# Patient Record
Sex: MALE | Race: WHITE | NOT HISPANIC OR LATINO
[De-identification: names, ages, dates, MRNs, and addresses within clinical notes are randomized per-mention and may not be internally consistent; named-entity substitution may affect disease eponyms.]

---

## 2018-04-16 ENCOUNTER — APPOINTMENT (OUTPATIENT)
Dept: SPEECH THERAPY | Facility: HOSPITAL | Age: 78
End: 2018-04-16
Payer: COMMERCIAL

## 2018-04-16 ENCOUNTER — APPOINTMENT (OUTPATIENT)
Dept: RADIOLOGY | Facility: HOSPITAL | Age: 78
End: 2018-04-16
Payer: COMMERCIAL

## 2018-04-16 ENCOUNTER — OUTPATIENT (OUTPATIENT)
Dept: OUTPATIENT SERVICES | Facility: HOSPITAL | Age: 78
LOS: 1 days | End: 2018-04-16

## 2018-04-16 ENCOUNTER — OUTPATIENT (OUTPATIENT)
Dept: OUTPATIENT SERVICES | Facility: HOSPITAL | Age: 78
LOS: 1 days | Discharge: HOME | End: 2018-04-16

## 2018-04-16 DIAGNOSIS — T17.890A OTHER FOREIGN OBJECT IN OTHER PARTS OF RESPIRATORY TRACT CAUSING ASPHYXIATION, INITIAL ENCOUNTER: ICD-10-CM

## 2018-04-16 PROCEDURE — 74230 X-RAY XM SWLNG FUNCJ C+: CPT | Mod: 26

## 2018-05-02 DIAGNOSIS — R13.13 DYSPHAGIA, PHARYNGEAL PHASE: ICD-10-CM

## 2018-07-04 ENCOUNTER — EMERGENCY (EMERGENCY)
Facility: HOSPITAL | Age: 78
LOS: 1 days | End: 2018-07-04
Payer: COMMERCIAL

## 2018-07-04 PROCEDURE — 73700 CT LOWER EXTREMITY W/O DYE: CPT | Mod: 26,RT

## 2018-07-04 PROCEDURE — 99284 EMERGENCY DEPT VISIT MOD MDM: CPT

## 2018-07-04 PROCEDURE — 72131 CT LUMBAR SPINE W/O DYE: CPT | Mod: 26

## 2018-07-04 PROCEDURE — 71046 X-RAY EXAM CHEST 2 VIEWS: CPT | Mod: 26

## 2018-07-23 ENCOUNTER — RX RENEWAL (OUTPATIENT)
Age: 78
End: 2018-07-23

## 2018-09-21 ENCOUNTER — RECORD ABSTRACTING (OUTPATIENT)
Age: 78
End: 2018-09-21

## 2018-09-21 DIAGNOSIS — Z87.891 PERSONAL HISTORY OF NICOTINE DEPENDENCE: ICD-10-CM

## 2018-09-21 DIAGNOSIS — J45.30 MILD PERSISTENT ASTHMA, UNCOMPLICATED: ICD-10-CM

## 2018-09-24 ENCOUNTER — APPOINTMENT (OUTPATIENT)
Dept: PULMONOLOGY | Facility: CLINIC | Age: 78
End: 2018-09-24
Payer: COMMERCIAL

## 2018-09-24 VITALS
WEIGHT: 118 LBS | SYSTOLIC BLOOD PRESSURE: 150 MMHG | HEIGHT: 67.5 IN | BODY MASS INDEX: 18.3 KG/M2 | RESPIRATION RATE: 12 BRPM | DIASTOLIC BLOOD PRESSURE: 76 MMHG | HEART RATE: 59 BPM | OXYGEN SATURATION: 92 % | TEMPERATURE: 97.8 F

## 2018-09-24 PROCEDURE — 99213 OFFICE O/P EST LOW 20 MIN: CPT | Mod: 25

## 2018-09-24 PROCEDURE — 71046 X-RAY EXAM CHEST 2 VIEWS: CPT

## 2018-10-03 ENCOUNTER — OTHER (OUTPATIENT)
Age: 78
End: 2018-10-03

## 2019-01-21 ENCOUNTER — RX RENEWAL (OUTPATIENT)
Age: 79
End: 2019-01-21

## 2019-03-01 ENCOUNTER — APPOINTMENT (OUTPATIENT)
Dept: PULMONOLOGY | Facility: CLINIC | Age: 79
End: 2019-03-01
Payer: COMMERCIAL

## 2019-03-01 VITALS
BODY MASS INDEX: 18.83 KG/M2 | HEIGHT: 67 IN | SYSTOLIC BLOOD PRESSURE: 169 MMHG | WEIGHT: 120 LBS | RESPIRATION RATE: 16 BRPM | HEART RATE: 59 BPM | TEMPERATURE: 97.8 F | DIASTOLIC BLOOD PRESSURE: 77 MMHG | OXYGEN SATURATION: 98 %

## 2019-03-01 PROCEDURE — 94727 GAS DIL/WSHOT DETER LNG VOL: CPT

## 2019-03-01 PROCEDURE — 94729 DIFFUSING CAPACITY: CPT

## 2019-03-01 PROCEDURE — 71046 X-RAY EXAM CHEST 2 VIEWS: CPT

## 2019-03-01 PROCEDURE — 94060 EVALUATION OF WHEEZING: CPT

## 2019-03-01 PROCEDURE — 99214 OFFICE O/P EST MOD 30 MIN: CPT | Mod: 25

## 2019-03-01 NOTE — HISTORY OF PRESENT ILLNESS
[FreeTextEntry1] : Some difficulty with vertebral fracture and osteoporosis.\par Seeing endo.\par \par No significant cough, wheezing, chest pain or SOB.\par

## 2019-03-01 NOTE — REASON FOR VISIT
[Follow-Up] : a follow-up visit [Abnormal Chest X-Ray] : abnormal Chest X-Ray [Bronchiectasis] : bronchiectasis [COPD] : COPD

## 2019-03-01 NOTE — PROCEDURE
[FreeTextEntry1] : CXR PA and lateral reveals a tortuous aorta. Bony structures are intact. There is no significant pleural disease. There is an area of linear atelectasis versus scarring in the upper lobe most likely right. There is no significant change compared to prior radiograph of 9/24/18.\par \par Pulmonary function testing.\par These data demonstrate a mild obstructive ventilatory deficit. There is no significant bronchodilator response. Normal Lung Volumes. There is a moderate diffusion impairment. \par Fx decreased compared to 3/18 but similar to 9/17.

## 2019-03-01 NOTE — PHYSICAL EXAM
[General Appearance - Well Developed] : well developed [General Appearance - Well Nourished] : well nourished [Normal Oropharynx] : normal oropharynx [Jugular Venous Distention Increased] : there was no jugular-venous distention [Heart Sounds] : normal S1 and S2 [Murmurs] : no murmurs present [Auscultation Breath Sounds / Voice Sounds] : lungs were clear to auscultation bilaterally [Lungs Percussion] : the lungs were normal to percussion [Abdomen Soft] : soft [Abdomen Tenderness] : non-tender [] : no hepato-splenomegaly [Nail Clubbing] : no clubbing of the fingernails [Cyanosis, Localized] : no localized cyanosis

## 2019-03-01 NOTE — CONSULT LETTER
[Dear  ___] : Dear ~WENDY, [Consult Letter:] : I had the pleasure of evaluating your patient, [unfilled]. [Please see my note below.] : Please see my note below. [Consult Closing:] : Thank you very much for allowing me to participate in the care of this patient.  If you have any questions, please do not hesitate to contact me. [Sincerely,] : Sincerely, [FreeTextEntry2] : Cresencio Luna MD [FreeTextEntry3] : Navdeep Solorio MD FCCP\par

## 2019-07-30 ENCOUNTER — RX RENEWAL (OUTPATIENT)
Age: 79
End: 2019-07-30

## 2019-08-02 ENCOUNTER — INPATIENT (INPATIENT)
Facility: HOSPITAL | Age: 79
LOS: 1 days | Discharge: HOME | End: 2019-08-04
Attending: INTERNAL MEDICINE | Admitting: INTERNAL MEDICINE
Payer: COMMERCIAL

## 2019-08-02 PROCEDURE — 71045 X-RAY EXAM CHEST 1 VIEW: CPT | Mod: 26

## 2019-08-02 PROCEDURE — 99285 EMERGENCY DEPT VISIT HI MDM: CPT

## 2019-08-02 PROCEDURE — 93458 L HRT ARTERY/VENTRICLE ANGIO: CPT | Mod: 26

## 2019-08-02 PROCEDURE — 99255 IP/OBS CONSLTJ NEW/EST HI 80: CPT | Mod: 25

## 2019-08-03 PROCEDURE — 99233 SBSQ HOSP IP/OBS HIGH 50: CPT

## 2019-08-03 PROCEDURE — 93010 ELECTROCARDIOGRAM REPORT: CPT

## 2019-08-03 PROCEDURE — 71250 CT THORAX DX C-: CPT | Mod: 26

## 2019-08-04 PROCEDURE — 93306 TTE W/DOPPLER COMPLETE: CPT | Mod: 26

## 2019-08-04 PROCEDURE — 99233 SBSQ HOSP IP/OBS HIGH 50: CPT

## 2019-08-05 ENCOUNTER — TRANSCRIPTION ENCOUNTER (OUTPATIENT)
Age: 79
End: 2019-08-05

## 2019-08-08 ENCOUNTER — APPOINTMENT (OUTPATIENT)
Dept: CARDIOLOGY | Facility: CLINIC | Age: 79
End: 2019-08-08
Payer: COMMERCIAL

## 2019-08-08 VITALS
DIASTOLIC BLOOD PRESSURE: 68 MMHG | HEIGHT: 67 IN | WEIGHT: 120 LBS | HEART RATE: 71 BPM | SYSTOLIC BLOOD PRESSURE: 152 MMHG | BODY MASS INDEX: 18.83 KG/M2

## 2019-08-08 DIAGNOSIS — Z98.890 OTHER SPECIFIED POSTPROCEDURAL STATES: ICD-10-CM

## 2019-08-08 DIAGNOSIS — Z82.49 FAMILY HISTORY OF ISCHEMIC HEART DISEASE AND OTHER DISEASES OF THE CIRCULATORY SYSTEM: ICD-10-CM

## 2019-08-08 PROCEDURE — 99215 OFFICE O/P EST HI 40 MIN: CPT

## 2019-08-08 NOTE — REASON FOR VISIT
[Coronary Artery Disease] : coronary artery disease [Follow-Up - From Hospitalization] : follow-up of a recent hospitalization for [Hypertension] : hypertension [Hyperlipidemia] : hyperlipidemia [FreeTextEntry1] : I saw this 79-year-old man in followup consultation on  08/08/19\par He presented to the hospital one week ago with chest pain and ST elevation and underwent emergent angiography, which showed moderate triple-vessel disease and a normal left ventricle.\par He was diagnosed as having pericarditis, with a pericardial rub auscultated and a CT scan showing a small pericardial effusion. He also had patchy bilateral pneumonia with a high white count and was treated with antibiotics, and colchicine for the pericarditis.\par Within 48 hours his pain was completely resolved, and his white count came down, and he was discharged on medication.

## 2019-08-08 NOTE — DISCUSSION/SUMMARY
[FreeTextEntry1] : 1) Stop antibiotics tomorrow.\par 2) Colchicine for another week and then wean.\par 3) F/U visit

## 2019-08-08 NOTE — PHYSICAL EXAM
[General Appearance - Well Developed] : well developed [Normal Appearance] : normal appearance [Well Groomed] : well groomed [General Appearance - Well Nourished] : well nourished [Normal Conjunctiva] : the conjunctiva exhibited no abnormalities [No Deformities] : no deformities [General Appearance - In No Acute Distress] : no acute distress [Eyelids - No Xanthelasma] : the eyelids demonstrated no xanthelasmas [Normal Oral Mucosa] : normal oral mucosa [No Oral Pallor] : no oral pallor [No Oral Cyanosis] : no oral cyanosis [Normal Jugular Venous V Waves Present] : normal jugular venous V waves present [Normal Jugular Venous A Waves Present] : normal jugular venous A waves present [No Jugular Venous Viera A Waves] : no jugular venous viera A waves [Respiration, Rhythm And Depth] : normal respiratory rhythm and effort [Auscultation Breath Sounds / Voice Sounds] : lungs were clear to auscultation bilaterally [Exaggerated Use Of Accessory Muscles For Inspiration] : no accessory muscle use [Heart Rate And Rhythm] : heart rate and rhythm were normal [Heart Sounds] : normal S1 and S2 [Abdomen Soft] : soft [Murmurs] : no murmurs present [Abdomen Tenderness] : non-tender [Abdomen Mass (___ Cm)] : no abdominal mass palpated [Abnormal Walk] : normal gait [Gait - Sufficient For Exercise Testing] : the gait was sufficient for exercise testing [Cyanosis, Localized] : no localized cyanosis [Nail Clubbing] : no clubbing of the fingernails [Petechial Hemorrhages (___cm)] : no petechial hemorrhages [] : no rash [Skin Color & Pigmentation] : normal skin color and pigmentation [Skin Lesions] : no skin lesions [No Venous Stasis] : no venous stasis [No Skin Ulcers] : no skin ulcer [No Xanthoma] : no  xanthoma was observed [Oriented To Time, Place, And Person] : oriented to person, place, and time [Affect] : the affect was normal [No Anxiety] : not feeling anxious [Mood] : the mood was normal

## 2019-09-04 ENCOUNTER — APPOINTMENT (OUTPATIENT)
Dept: CARDIOLOGY | Facility: CLINIC | Age: 79
End: 2019-09-04
Payer: COMMERCIAL

## 2019-09-04 ENCOUNTER — APPOINTMENT (OUTPATIENT)
Dept: PULMONOLOGY | Facility: CLINIC | Age: 79
End: 2019-09-04
Payer: COMMERCIAL

## 2019-09-04 VITALS
OXYGEN SATURATION: 98 % | BODY MASS INDEX: 18.83 KG/M2 | RESPIRATION RATE: 15 BRPM | HEIGHT: 67 IN | DIASTOLIC BLOOD PRESSURE: 73 MMHG | HEART RATE: 78 BPM | WEIGHT: 120 LBS | SYSTOLIC BLOOD PRESSURE: 131 MMHG

## 2019-09-04 VITALS — TEMPERATURE: 98.6 F

## 2019-09-04 PROCEDURE — 36415 COLL VENOUS BLD VENIPUNCTURE: CPT

## 2019-09-04 PROCEDURE — 71046 X-RAY EXAM CHEST 2 VIEWS: CPT

## 2019-09-04 PROCEDURE — 99214 OFFICE O/P EST MOD 30 MIN: CPT | Mod: 25

## 2019-09-04 PROCEDURE — 93306 TTE W/DOPPLER COMPLETE: CPT

## 2019-09-04 PROCEDURE — 94060 EVALUATION OF WHEEZING: CPT

## 2019-09-04 RX ORDER — AZITHROMYCIN 250 MG/1
250 TABLET, FILM COATED ORAL
Qty: 6 | Refills: 0 | Status: DISCONTINUED | COMMUNITY
Start: 2019-07-25

## 2019-09-04 RX ORDER — DOXYCYCLINE HYCLATE 100 MG/1
100 TABLET ORAL
Qty: 10 | Refills: 0 | Status: DISCONTINUED | COMMUNITY
Start: 2019-08-04 | End: 2019-09-04

## 2019-09-04 RX ORDER — FINASTERIDE 5 MG/1
5 TABLET, FILM COATED ORAL
Qty: 30 | Refills: 0 | Status: ACTIVE | COMMUNITY
Start: 2019-03-19

## 2019-09-04 RX ORDER — ATORVASTATIN CALCIUM 10 MG/1
10 TABLET, FILM COATED ORAL
Qty: 30 | Refills: 0 | Status: DISCONTINUED | COMMUNITY
Start: 2019-08-04 | End: 2019-09-04

## 2019-09-04 NOTE — ASSESSMENT
[FreeTextEntry1] : Discuss with cardio\par Course of Zithro and Medrol\par Labs drawn in office today\par Close observation

## 2019-09-04 NOTE — HISTORY OF PRESENT ILLNESS
[FreeTextEntry1] : Patient was hospitalized in august for chest pain and cough and had angiogram and had acute pericarditis and pneumonia. placed on doxy on discharge.Hospitalized in ICU [facility]. Told secondary to pneumonia. \par \par Now c/o cough for past 10 days with some clear mucus. Feels discomfort with deep breaths, difficult to sleep. Every afternoon had body aches, recent d/c Lipitor and took Motrin with help. Just discontinued.\par ?? Fever. \par \par Was on colchicine for pericarditis off 1 week. \par \par CC is cough

## 2019-09-04 NOTE — DISCUSSION/SUMMARY
[FreeTextEntry1] : Cough likely exacerbation of bronchiectasis, airways disease\par Pulmonary nodule likely old will compare.\par Chest pain musculoskeletal related to cough vs. recc. of pericarditis. Patient feels cough related.

## 2019-09-04 NOTE — PROCEDURE
[FreeTextEntry1] : CXR PA and lateral reveals a tortuous aorta. Bony structures are intact. There is no significant pleural disease. There is an area of linear atelectasis versus scarring in the upper lobe most likely right and in left paracardiac region.\par  The only change compared to prior radiograph of 4/1/19 is slight increase in size of cardiac silhouette.\par \par Pulmonary function testing.\par These data demonstrate a mild-moderate obstructive ventilatory deficit. There is no significant bronchodilator response. \par PFT attached relatively stable function.\par \par CT noted.\par \par Stat echo small effusion no restriction or tamponade. \par \par

## 2019-09-06 ENCOUNTER — APPOINTMENT (OUTPATIENT)
Dept: PULMONOLOGY | Facility: CLINIC | Age: 79
End: 2019-09-06

## 2019-09-06 LAB
ALBUMIN SERPL ELPH-MCNC: 4.1 G/DL
ALP BLD-CCNC: 107 U/L
ALT SERPL-CCNC: 23 U/L
ANION GAP SERPL CALC-SCNC: 12 MMOL/L
AST SERPL-CCNC: 28 U/L
BASOPHILS # BLD AUTO: 0.1 K/UL
BASOPHILS NFR BLD AUTO: 0.8 %
BILIRUB SERPL-MCNC: 0.7 MG/DL
BUN SERPL-MCNC: 26 MG/DL
CALCIUM SERPL-MCNC: 9.5 MG/DL
CHLORIDE SERPL-SCNC: 104 MMOL/L
CK SERPL-CCNC: 59 U/L
CO2 SERPL-SCNC: 23 MMOL/L
CREAT SERPL-MCNC: 1.42 MG/DL
CRP SERPL-MCNC: 4.74 MG/DL
EOSINOPHIL # BLD AUTO: 0.24 K/UL
EOSINOPHIL NFR BLD AUTO: 2 %
ERYTHROCYTE [SEDIMENTATION RATE] IN BLOOD BY WESTERGREN METHOD: 42 MM/HR
GLUCOSE SERPL-MCNC: 90 MG/DL
HCT VFR BLD CALC: 41.6 %
HGB BLD-MCNC: 13.2 G/DL
IMM GRANULOCYTES NFR BLD AUTO: 0.7 %
LYMPHOCYTES # BLD AUTO: 1.47 K/UL
LYMPHOCYTES NFR BLD AUTO: 12.1 %
MAN DIFF?: NORMAL
MCHC RBC-ENTMCNC: 31.7 GM/DL
MCHC RBC-ENTMCNC: 32.4 PG
MCV RBC AUTO: 102.2 FL
MONOCYTES # BLD AUTO: 1.5 K/UL
MONOCYTES NFR BLD AUTO: 12.4 %
NEUTROPHILS # BLD AUTO: 8.72 K/UL
NEUTROPHILS NFR BLD AUTO: 72 %
PLATELET # BLD AUTO: 400 K/UL
POTASSIUM SERPL-SCNC: 5 MMOL/L
PROT SERPL-MCNC: 6.9 G/DL
RBC # BLD: 4.07 M/UL
RBC # FLD: 13.1 %
SODIUM SERPL-SCNC: 139 MMOL/L
WBC # FLD AUTO: 12.12 K/UL

## 2019-09-11 ENCOUNTER — APPOINTMENT (OUTPATIENT)
Dept: PULMONOLOGY | Facility: CLINIC | Age: 79
End: 2019-09-11
Payer: COMMERCIAL

## 2019-09-11 VITALS
RESPIRATION RATE: 16 BRPM | SYSTOLIC BLOOD PRESSURE: 120 MMHG | HEART RATE: 68 BPM | OXYGEN SATURATION: 98 % | DIASTOLIC BLOOD PRESSURE: 70 MMHG

## 2019-09-11 DIAGNOSIS — R05 COUGH: ICD-10-CM

## 2019-09-11 PROCEDURE — 36415 COLL VENOUS BLD VENIPUNCTURE: CPT

## 2019-09-11 PROCEDURE — 99213 OFFICE O/P EST LOW 20 MIN: CPT | Mod: 25

## 2019-09-11 PROCEDURE — 95012 NITRIC OXIDE EXP GAS DETER: CPT

## 2019-09-11 RX ORDER — AZITHROMYCIN 250 MG/1
250 TABLET, FILM COATED ORAL
Qty: 1 | Refills: 1 | Status: DISCONTINUED | COMMUNITY
Start: 2019-09-04 | End: 2019-09-11

## 2019-09-11 RX ORDER — METHYLPREDNISOLONE 4 MG/1
4 TABLET ORAL
Qty: 1 | Refills: 0 | Status: DISCONTINUED | COMMUNITY
Start: 2019-09-04 | End: 2019-09-11

## 2019-09-11 NOTE — PHYSICAL EXAM
[General Appearance - Well Developed] : well developed [General Appearance - Well Nourished] : well nourished [Normal Oropharynx] : normal oropharynx [Jugular Venous Distention Increased] : there was no jugular-venous distention [Heart Sounds] : normal S1 and S2 [Murmurs] : no murmurs present [Auscultation Breath Sounds / Voice Sounds] : lungs were clear to auscultation bilaterally [Lungs Percussion] : the lungs were normal to percussion [Abdomen Tenderness] : non-tender [Abdomen Soft] : soft [] : no hepato-splenomegaly [Nail Clubbing] : no clubbing of the fingernails [Cyanosis, Localized] : no localized cyanosis

## 2019-09-16 LAB
BASOPHILS # BLD AUTO: 0.12 K/UL
BASOPHILS NFR BLD AUTO: 1.2 %
CRP SERPL-MCNC: 1.14 MG/DL
EOSINOPHIL # BLD AUTO: 0.84 K/UL
EOSINOPHIL NFR BLD AUTO: 8.2 %
ERYTHROCYTE [SEDIMENTATION RATE] IN BLOOD BY WESTERGREN METHOD: 20 MM/HR
HCT VFR BLD CALC: 43.8 %
HGB BLD-MCNC: 13.5 G/DL
IMM GRANULOCYTES NFR BLD AUTO: 0.7 %
LYMPHOCYTES # BLD AUTO: 1.41 K/UL
LYMPHOCYTES NFR BLD AUTO: 13.8 %
MAN DIFF?: NORMAL
MCHC RBC-ENTMCNC: 30.8 GM/DL
MCHC RBC-ENTMCNC: 32.1 PG
MCV RBC AUTO: 104.3 FL
MONOCYTES # BLD AUTO: 0.99 K/UL
MONOCYTES NFR BLD AUTO: 9.7 %
NEUTROPHILS # BLD AUTO: 6.81 K/UL
NEUTROPHILS NFR BLD AUTO: 66.4 %
PLATELET # BLD AUTO: 504 K/UL
RBC # BLD: 4.2 M/UL
RBC # FLD: 13.3 %
WBC # FLD AUTO: 10.24 K/UL

## 2019-09-16 NOTE — ASSESSMENT
[FreeTextEntry1] : Continue Colchicine\par Repeat CRP, CBC and ESR\par Repeat Medrol\par \par \par ADD: Discuss obtaining cd of old and new ct for comparison\par Prior NRAD\par

## 2019-09-16 NOTE — PROCEDURE
[FreeTextEntry1] :  Sept 6th\par  CXR PA and lateral reveals a tortuous aorta. Bony structures are intact. There is no significant pleural disease. There is an area of linear atelectasis versus scarring in the upper lobe most likely right and in left paracardiac region. The only change compared to prior radiograph of 4/1/19 is slight increase in size of cardiac silhouette.\par \par \par \par \par \par \par

## 2019-09-16 NOTE — DISCUSSION/SUMMARY
[FreeTextEntry1] : Cough likely exacerbation of bronchiectasis, airways disease improved. Still present\par Pulmonary nodule likely old will compare.\par Chest pain musculoskeletal related to cough vs. recc. of pericarditis. improved.

## 2019-09-16 NOTE — REASON FOR VISIT
[Follow-Up] : a follow-up visit [Bronchiectasis] : bronchiectasis [COPD] : COPD [FreeTextEntry2] : better after Z-Yefri and Medrol still with some chest discomfort left side but better

## 2019-09-16 NOTE — HISTORY OF PRESENT ILLNESS
[FreeTextEntry1] : On breo daily\par \par Feeling better still some dry cough.\par On colchicine\par Some pain but much better.

## 2019-10-11 ENCOUNTER — APPOINTMENT (OUTPATIENT)
Dept: PULMONOLOGY | Facility: CLINIC | Age: 79
End: 2019-10-11
Payer: COMMERCIAL

## 2019-10-11 VITALS
HEART RATE: 67 BPM | DIASTOLIC BLOOD PRESSURE: 78 MMHG | OXYGEN SATURATION: 97 % | SYSTOLIC BLOOD PRESSURE: 135 MMHG | TEMPERATURE: 98 F

## 2019-10-11 PROCEDURE — 36415 COLL VENOUS BLD VENIPUNCTURE: CPT

## 2019-10-11 PROCEDURE — 99213 OFFICE O/P EST LOW 20 MIN: CPT | Mod: 25

## 2019-10-11 RX ORDER — METHYLPREDNISOLONE 4 MG/1
4 TABLET ORAL
Qty: 1 | Refills: 0 | Status: DISCONTINUED | COMMUNITY
Start: 2019-09-11 | End: 2019-10-11

## 2019-10-12 NOTE — DISCUSSION/SUMMARY
[FreeTextEntry1] : Chest pain essentially resolved. ? Pleuritis or pericarditis\par Abnormal CT\par Cough improved on bronchodilator therapy\par COPD\par Bronchiectasis.

## 2019-10-12 NOTE — ASSESSMENT
[FreeTextEntry1] : Wean off colchicine\par Continue present bronchodilator therapy\par Have last 2 CT scans compared\par Gave pt. cd of CT of 2018

## 2019-10-12 NOTE — HISTORY OF PRESENT ILLNESS
[FreeTextEntry1] : Overall feeling better. Occasional cough, denies wheeze. No SOB. Using Breo daily. \par asking for a proair inhaler. No more chest discomfort. Only pain now feels MS in origin.\par \par \par will get flu and pneumo shot at [facility]

## 2019-10-15 LAB
BASOPHILS # BLD AUTO: 0.09 K/UL
BASOPHILS NFR BLD AUTO: 1.1 %
CRP SERPL-MCNC: 0.1 MG/DL
EOSINOPHIL # BLD AUTO: 0.37 K/UL
EOSINOPHIL NFR BLD AUTO: 4.5 %
HCT VFR BLD CALC: 45 %
HGB BLD-MCNC: 13.7 G/DL
IMM GRANULOCYTES NFR BLD AUTO: 0.8 %
LYMPHOCYTES # BLD AUTO: 1.67 K/UL
LYMPHOCYTES NFR BLD AUTO: 20.2 %
MAN DIFF?: NORMAL
MCHC RBC-ENTMCNC: 30.4 GM/DL
MCHC RBC-ENTMCNC: 31.1 PG
MCV RBC AUTO: 102.3 FL
MONOCYTES # BLD AUTO: 0.74 K/UL
MONOCYTES NFR BLD AUTO: 8.9 %
NEUTROPHILS # BLD AUTO: 5.34 K/UL
NEUTROPHILS NFR BLD AUTO: 64.5 %
PLATELET # BLD AUTO: 446 K/UL
RBC # BLD: 4.4 M/UL
RBC # FLD: 13.9 %
WBC # FLD AUTO: 8.28 K/UL

## 2019-12-03 ENCOUNTER — MEDICATION RENEWAL (OUTPATIENT)
Age: 79
End: 2019-12-03

## 2020-01-06 ENCOUNTER — APPOINTMENT (OUTPATIENT)
Dept: PULMONOLOGY | Facility: CLINIC | Age: 80
End: 2020-01-06
Payer: MEDICARE

## 2020-01-06 ENCOUNTER — RX RENEWAL (OUTPATIENT)
Age: 80
End: 2020-01-06

## 2020-01-06 VITALS
HEART RATE: 62 BPM | DIASTOLIC BLOOD PRESSURE: 76 MMHG | TEMPERATURE: 97.8 F | SYSTOLIC BLOOD PRESSURE: 136 MMHG | RESPIRATION RATE: 16 BRPM | OXYGEN SATURATION: 98 %

## 2020-01-06 PROCEDURE — 94727 GAS DIL/WSHOT DETER LNG VOL: CPT

## 2020-01-06 PROCEDURE — 94729 DIFFUSING CAPACITY: CPT

## 2020-01-06 PROCEDURE — 99213 OFFICE O/P EST LOW 20 MIN: CPT | Mod: 25

## 2020-01-06 PROCEDURE — 94060 EVALUATION OF WHEEZING: CPT

## 2020-01-06 RX ORDER — ALBUTEROL SULFATE 90 UG/1
108 (90 BASE) AEROSOL, METERED RESPIRATORY (INHALATION)
Qty: 1 | Refills: 5 | Status: DISCONTINUED | COMMUNITY
Start: 2019-10-11 | End: 2020-01-06

## 2020-01-06 RX ORDER — COLCHICINE 0.6 MG/1
0.6 CAPSULE ORAL
Qty: 30 | Refills: 3 | Status: DISCONTINUED | COMMUNITY
End: 2020-01-06

## 2020-01-06 RX ORDER — COLCHICINE 0.6 MG/1
0.6 TABLET ORAL
Qty: 7 | Refills: 0 | Status: DISCONTINUED | COMMUNITY
Start: 2019-08-04 | End: 2020-01-06

## 2020-01-06 RX ORDER — ALBUTEROL SULFATE 90 UG/1
108 (90 BASE) AEROSOL, METERED RESPIRATORY (INHALATION)
Qty: 1 | Refills: 3 | Status: DISCONTINUED | COMMUNITY
Start: 2019-03-01 | End: 2020-01-06

## 2020-01-06 NOTE — PHYSICAL EXAM
[General Appearance - Well Developed] : well developed [Normal Oropharynx] : normal oropharynx [General Appearance - Well Nourished] : well nourished [Jugular Venous Distention Increased] : there was no jugular-venous distention [Heart Sounds] : normal S1 and S2 [Auscultation Breath Sounds / Voice Sounds] : lungs were clear to auscultation bilaterally [Murmurs] : no murmurs present [Lungs Percussion] : the lungs were normal to percussion [Abdomen Tenderness] : non-tender [Abdomen Soft] : soft [] : no hepato-splenomegaly [Nail Clubbing] : no clubbing of the fingernails [Cyanosis, Localized] : no localized cyanosis

## 2020-01-06 NOTE — DISCUSSION/SUMMARY
[FreeTextEntry1] : \par Abnormal CT\par Cough improved on bronchodilator therapy\par COPD\par Bronchiectasis.

## 2020-01-06 NOTE — HISTORY OF PRESENT ILLNESS
[FreeTextEntry1] : Overall feeling better. Occasional cough, denies wheeze. No SOB. Using Breo daily. \par using albuterol rare. No more chest discomfort..\par \par got flu shot\par \par On prolia for osteoporosis. \par

## 2020-01-06 NOTE — ASSESSMENT
[FreeTextEntry1] : \par Continue present bronchodilator therapy\par Have last 2 CT scans compared. Gave pt another disc from 2018 to bring to [facility]\par Medications reviewed and renewed.\par

## 2020-01-06 NOTE — PROCEDURE
[FreeTextEntry1] : 01/06/2020\par Pulmonary function testing\par These data demonstrate a mild obstructive ventilatory deficit. There is no significant bronchodilator response. Normal Lung Volumes. There is a moderate-severe diffusion impairment. \par Relatively stable function\par

## 2020-01-23 ENCOUNTER — TRANSCRIPTION ENCOUNTER (OUTPATIENT)
Age: 80
End: 2020-01-23

## 2020-02-11 ENCOUNTER — APPOINTMENT (OUTPATIENT)
Dept: CARDIOLOGY | Facility: CLINIC | Age: 80
End: 2020-02-11
Payer: MEDICARE

## 2020-02-11 VITALS
HEART RATE: 69 BPM | OXYGEN SATURATION: 97 % | BODY MASS INDEX: 18.83 KG/M2 | SYSTOLIC BLOOD PRESSURE: 142 MMHG | HEIGHT: 67 IN | DIASTOLIC BLOOD PRESSURE: 70 MMHG | WEIGHT: 120 LBS

## 2020-02-11 DIAGNOSIS — I31.9 DISEASE OF PERICARDIUM, UNSPECIFIED: ICD-10-CM

## 2020-02-11 PROCEDURE — 99215 OFFICE O/P EST HI 40 MIN: CPT

## 2020-02-11 RX ORDER — HYDROCORTISONE 25 MG/ML
2.5 LOTION TOPICAL
Qty: 59 | Refills: 0 | Status: DISCONTINUED | COMMUNITY
Start: 2019-03-11 | End: 2020-02-11

## 2020-02-11 RX ORDER — DENOSUMAB 60 MG/ML
60 INJECTION SUBCUTANEOUS
Refills: 0 | Status: ACTIVE | COMMUNITY

## 2020-02-11 RX ORDER — ASPIRIN 81 MG/1
81 TABLET, CHEWABLE ORAL
Qty: 90 | Refills: 2 | Status: DISCONTINUED | COMMUNITY
End: 2020-02-11

## 2020-02-11 NOTE — PHYSICAL EXAM
[General Appearance - Well Developed] : well developed [Normal Appearance] : normal appearance [Well Groomed] : well groomed [General Appearance - Well Nourished] : well nourished [No Deformities] : no deformities [Eyelids - No Xanthelasma] : the eyelids demonstrated no xanthelasmas [General Appearance - In No Acute Distress] : no acute distress [Normal Conjunctiva] : the conjunctiva exhibited no abnormalities [Normal Oral Mucosa] : normal oral mucosa [No Oral Pallor] : no oral pallor [No Oral Cyanosis] : no oral cyanosis [Normal Jugular Venous A Waves Present] : normal jugular venous A waves present [Normal Jugular Venous V Waves Present] : normal jugular venous V waves present [No Jugular Venous Viera A Waves] : no jugular venous viera A waves [Respiration, Rhythm And Depth] : normal respiratory rhythm and effort [Exaggerated Use Of Accessory Muscles For Inspiration] : no accessory muscle use [Auscultation Breath Sounds / Voice Sounds] : lungs were clear to auscultation bilaterally [Heart Rate And Rhythm] : heart rate and rhythm were normal [Heart Sounds] : normal S1 and S2 [Murmurs] : no murmurs present [Abdomen Soft] : soft [Abdomen Tenderness] : non-tender [Abdomen Mass (___ Cm)] : no abdominal mass palpated [Abnormal Walk] : normal gait [Nail Clubbing] : no clubbing of the fingernails [Gait - Sufficient For Exercise Testing] : the gait was sufficient for exercise testing [Petechial Hemorrhages (___cm)] : no petechial hemorrhages [Cyanosis, Localized] : no localized cyanosis [] : no ischemic changes [Skin Color & Pigmentation] : normal skin color and pigmentation [Skin Lesions] : no skin lesions [No Venous Stasis] : no venous stasis [No Skin Ulcers] : no skin ulcer [No Xanthoma] : no  xanthoma was observed [Oriented To Time, Place, And Person] : oriented to person, place, and time [Affect] : the affect was normal [Mood] : the mood was normal [No Anxiety] : not feeling anxious

## 2020-02-11 NOTE — REASON FOR VISIT
[Follow-Up - Clinic] : a clinic follow-up of [Coronary Artery Disease] : coronary artery disease [Hyperlipidemia] : hyperlipidemia [Hypertension] : hypertension [FreeTextEntry1] : I saw this 80-year-old man in followup consultation on  02/11/20\par He presented to the hospital last summer with chest pain and ST elevation and underwent emergent angiography, which showed moderate triple-vessel disease and a normal left ventricle.\par He was diagnosed as having pericarditis, with a pericardial rub auscultated and a CT scan showing a small pericardial effusion. He also had patchy bilateral pneumonia with a high white count and was treated with antibiotics, and colchicine for the pericarditis.\par Within 48 hours his pain was completely resolved, and his white count came down, and he was discharged on medication.\par He has been doing well with no recurrence, functioning at a high level without symptoms

## 2020-03-02 ENCOUNTER — RX RENEWAL (OUTPATIENT)
Age: 80
End: 2020-03-02

## 2020-03-27 RX ORDER — ALBUTEROL SULFATE 90 UG/1
108 (90 BASE) INHALANT RESPIRATORY (INHALATION)
Qty: 90 | Refills: 5 | Status: ACTIVE | COMMUNITY
Start: 2020-01-06 | End: 1900-01-01

## 2020-05-04 ENCOUNTER — APPOINTMENT (OUTPATIENT)
Dept: PULMONOLOGY | Facility: CLINIC | Age: 80
End: 2020-05-04
Payer: MEDICARE

## 2020-05-04 ENCOUNTER — APPOINTMENT (OUTPATIENT)
Dept: PULMONOLOGY | Facility: CLINIC | Age: 80
End: 2020-05-04

## 2020-05-04 PROCEDURE — 99213 OFFICE O/P EST LOW 20 MIN: CPT | Mod: 95

## 2020-05-04 NOTE — ASSESSMENT
[FreeTextEntry1] : Continue present bronchodilator therapy\par FU July or August\par Possible CT august\par \par Duration discussion and decision making 15 minutes.\par

## 2020-05-04 NOTE — HISTORY OF PRESENT ILLNESS
[Home] : at home, [unfilled] , at the time of the visit. [Medical Office: ([facility])___] : at the medical office located in  [TextBox_4] : This visit was provided via telehealth using real-time 2-way audio visual technology. The patient, ART BLACK , [place] [address] [place] [address]  at the time of the visit.\par The provider, Navdeep Solorio, was located at office [address] [place] [address] at the time of the visit. \par \par  The patient, Mr. ART BLACK  and Physician Navdeep Duenas participated in the telehealth encounter.\par \par Verbal consent obtained by  from patient\par \par Doing well\par \par No significant cough, wheezing, chest pain or SOB.\par \par Compliant to bronchodilator therapy\par

## 2020-05-04 NOTE — DISCUSSION/SUMMARY
[FreeTextEntry1] : \par Abnormal CT\par Cough resolved on bronchodilator therapy\par COPD\par Bronchiectasis.

## 2020-07-23 ENCOUNTER — NON-APPOINTMENT (OUTPATIENT)
Age: 80
End: 2020-07-23

## 2020-07-23 ENCOUNTER — APPOINTMENT (OUTPATIENT)
Dept: CARDIOLOGY | Facility: CLINIC | Age: 80
End: 2020-07-23
Payer: MEDICARE

## 2020-07-23 VITALS
SYSTOLIC BLOOD PRESSURE: 138 MMHG | HEIGHT: 67 IN | DIASTOLIC BLOOD PRESSURE: 70 MMHG | BODY MASS INDEX: 18.21 KG/M2 | OXYGEN SATURATION: 98 % | WEIGHT: 116 LBS | HEART RATE: 68 BPM

## 2020-07-23 PROCEDURE — 99215 OFFICE O/P EST HI 40 MIN: CPT

## 2020-07-23 PROCEDURE — 93000 ELECTROCARDIOGRAM COMPLETE: CPT

## 2020-07-23 NOTE — REASON FOR VISIT
[Follow-Up - Clinic] : a clinic follow-up of [Coronary Artery Disease] : coronary artery disease [Hypertension] : hypertension [Hyperlipidemia] : hyperlipidemia [FreeTextEntry1] : I saw this 80-year-old man in followup consultation on  07/23/20\par He presented to the hospital last summer with chest pain and ST elevation and underwent emergent angiography, which showed moderate triple-vessel disease and a normal left ventricle.\par He was diagnosed as having pericarditis, with a pericardial rub auscultated and a CT scan showing a small pericardial effusion. He also had patchy bilateral pneumonia with a high white count and was treated with antibiotics, and colchicine for the pericarditis.\par Within 48 hours his pain was completely resolved, and his white count came down, and he was discharged on medication.\par He has been doing well with no recurrence, functioning at a high level without symptoms

## 2020-07-23 NOTE — PHYSICAL EXAM
[General Appearance - Well Developed] : well developed [Normal Appearance] : normal appearance [Well Groomed] : well groomed [No Deformities] : no deformities [General Appearance - Well Nourished] : well nourished [Normal Conjunctiva] : the conjunctiva exhibited no abnormalities [General Appearance - In No Acute Distress] : no acute distress [Eyelids - No Xanthelasma] : the eyelids demonstrated no xanthelasmas [Normal Oral Mucosa] : normal oral mucosa [No Oral Pallor] : no oral pallor [No Oral Cyanosis] : no oral cyanosis [Normal Jugular Venous A Waves Present] : normal jugular venous A waves present [Normal Jugular Venous V Waves Present] : normal jugular venous V waves present [No Jugular Venous Viera A Waves] : no jugular venous viera A waves [Exaggerated Use Of Accessory Muscles For Inspiration] : no accessory muscle use [Respiration, Rhythm And Depth] : normal respiratory rhythm and effort [Heart Sounds] : normal S1 and S2 [Auscultation Breath Sounds / Voice Sounds] : lungs were clear to auscultation bilaterally [Heart Rate And Rhythm] : heart rate and rhythm were normal [Abdomen Soft] : soft [Abdomen Tenderness] : non-tender [Murmurs] : no murmurs present [Abnormal Walk] : normal gait [Abdomen Mass (___ Cm)] : no abdominal mass palpated [Nail Clubbing] : no clubbing of the fingernails [Gait - Sufficient For Exercise Testing] : the gait was sufficient for exercise testing [Cyanosis, Localized] : no localized cyanosis [Petechial Hemorrhages (___cm)] : no petechial hemorrhages [Skin Color & Pigmentation] : normal skin color and pigmentation [] : no ischemic changes [No Venous Stasis] : no venous stasis [No Skin Ulcers] : no skin ulcer [Skin Lesions] : no skin lesions [No Xanthoma] : no  xanthoma was observed [Affect] : the affect was normal [Oriented To Time, Place, And Person] : oriented to person, place, and time [Mood] : the mood was normal [No Anxiety] : not feeling anxious

## 2020-08-25 ENCOUNTER — APPOINTMENT (OUTPATIENT)
Dept: PULMONOLOGY | Facility: CLINIC | Age: 80
End: 2020-08-25

## 2020-11-06 DIAGNOSIS — Z20.828 CONTACT WITH AND (SUSPECTED) EXPOSURE TO OTHER VIRAL COMMUNICABLE DISEASES: ICD-10-CM

## 2020-11-10 LAB — SARS-COV-2 N GENE NPH QL NAA+PROBE: NOT DETECTED

## 2020-11-13 ENCOUNTER — APPOINTMENT (OUTPATIENT)
Dept: PULMONOLOGY | Facility: CLINIC | Age: 80
End: 2020-11-13
Payer: MEDICARE

## 2020-11-13 VITALS
HEIGHT: 67 IN | TEMPERATURE: 98 F | OXYGEN SATURATION: 100 % | HEART RATE: 56 BPM | DIASTOLIC BLOOD PRESSURE: 78 MMHG | SYSTOLIC BLOOD PRESSURE: 123 MMHG | BODY MASS INDEX: 18.83 KG/M2 | WEIGHT: 120 LBS | RESPIRATION RATE: 16 BRPM

## 2020-11-13 LAB — POCT - HEMOGLOBIN (HGB), QUANTITATIVE, TRANSCUTANEOUS: 13.7

## 2020-11-13 PROCEDURE — 99214 OFFICE O/P EST MOD 30 MIN: CPT | Mod: 25

## 2020-11-13 PROCEDURE — 94010 BREATHING CAPACITY TEST: CPT

## 2020-11-13 PROCEDURE — 94727 GAS DIL/WSHOT DETER LNG VOL: CPT

## 2020-11-13 PROCEDURE — ZZZZZ: CPT

## 2020-11-13 PROCEDURE — 88738 HGB QUANT TRANSCUTANEOUS: CPT

## 2020-11-13 PROCEDURE — 94729 DIFFUSING CAPACITY: CPT

## 2020-11-13 NOTE — DISCUSSION/SUMMARY
[FreeTextEntry1] : Bronchiectasis stable without recent exacerbations.\par Chronic obstructive pulmonary disease stable.\par Pulmonary nodule\par

## 2020-11-13 NOTE — HISTORY OF PRESENT ILLNESS
[Never] : never [TextBox_4] : Doing well\par no sob \par cough gone , very little mucus\par \par on breo \par no  proair inhaler use\par \par  [TextBox_29] : cigars until 4 years ago

## 2020-11-13 NOTE — ASSESSMENT
[FreeTextEntry1] : Continue present bronchodilator therapy\par Follow-up CAT scan of the chest.\par \par \par \par

## 2020-11-13 NOTE — PROCEDURE
[FreeTextEntry1] : 11/13/2020\par Pulmonary function testing\par These data demonstrate a mild obstructive ventilatory deficit. Normal Lung Volumes. There is a moderate-severe diffusion impairment. \par \par \par \par \par \par \par

## 2021-01-14 ENCOUNTER — APPOINTMENT (OUTPATIENT)
Dept: CARDIOLOGY | Facility: CLINIC | Age: 81
End: 2021-01-14
Payer: MEDICARE

## 2021-01-14 VITALS
OXYGEN SATURATION: 97 % | WEIGHT: 120 LBS | HEIGHT: 67 IN | SYSTOLIC BLOOD PRESSURE: 136 MMHG | HEART RATE: 57 BPM | TEMPERATURE: 97.3 F | BODY MASS INDEX: 18.83 KG/M2 | DIASTOLIC BLOOD PRESSURE: 82 MMHG

## 2021-01-14 PROCEDURE — 99215 OFFICE O/P EST HI 40 MIN: CPT

## 2021-01-14 NOTE — REASON FOR VISIT
[Follow-Up - Clinic] : a clinic follow-up of [Coronary Artery Disease] : coronary artery disease [Hyperlipidemia] : hyperlipidemia [Hypertension] : hypertension [FreeTextEntry1] : I saw this 80-year-old man in followup consultation on  01/14/21\par He presented to the hospital 09/19 with chest pain and ST elevation and underwent emergent angiography, which showed moderate triple-vessel disease and a normal left ventricle.\par He was diagnosed as having pericarditis, with a pericardial rub auscultated and a CT scan showing a small pericardial effusion. He also had patchy bilateral pneumonia with a high white count and was treated with antibiotics, and colchicine for the pericarditis.\par Within 48 hours his pain was completely resolved, and his white count came down, and he was discharged on medication.\par He has been doing well with no recurrence, functioning at a high level without symptoms\par On medication his lipid profile is excellent

## 2021-01-14 NOTE — DISCUSSION/SUMMARY
[FreeTextEntry1] : Continue current meds. especially lipid meds.\par Lipid profile and HgbA1c was excellent\par Return in 6 months\par \par

## 2021-01-14 NOTE — PHYSICAL EXAM
[General Appearance - Well Developed] : well developed [Normal Appearance] : normal appearance [Well Groomed] : well groomed [General Appearance - Well Nourished] : well nourished [No Deformities] : no deformities [General Appearance - In No Acute Distress] : no acute distress [Normal Conjunctiva] : the conjunctiva exhibited no abnormalities [Eyelids - No Xanthelasma] : the eyelids demonstrated no xanthelasmas [Normal Oral Mucosa] : normal oral mucosa [No Oral Pallor] : no oral pallor [No Oral Cyanosis] : no oral cyanosis [Normal Jugular Venous A Waves Present] : normal jugular venous A waves present [Normal Jugular Venous V Waves Present] : normal jugular venous V waves present [No Jugular Venous Viera A Waves] : no jugular venous viera A waves [Exaggerated Use Of Accessory Muscles For Inspiration] : no accessory muscle use [Respiration, Rhythm And Depth] : normal respiratory rhythm and effort [Auscultation Breath Sounds / Voice Sounds] : lungs were clear to auscultation bilaterally [Heart Rate And Rhythm] : heart rate and rhythm were normal [Heart Sounds] : normal S1 and S2 [Murmurs] : no murmurs present [Abdomen Soft] : soft [Abdomen Tenderness] : non-tender [Abdomen Mass (___ Cm)] : no abdominal mass palpated [Abnormal Walk] : normal gait [Gait - Sufficient For Exercise Testing] : the gait was sufficient for exercise testing [Nail Clubbing] : no clubbing of the fingernails [Cyanosis, Localized] : no localized cyanosis [Petechial Hemorrhages (___cm)] : no petechial hemorrhages [Skin Color & Pigmentation] : normal skin color and pigmentation [] : no rash [No Venous Stasis] : no venous stasis [Skin Lesions] : no skin lesions [No Skin Ulcers] : no skin ulcer [No Xanthoma] : no  xanthoma was observed [Oriented To Time, Place, And Person] : oriented to person, place, and time [Affect] : the affect was normal [Mood] : the mood was normal [No Anxiety] : not feeling anxious

## 2021-05-14 ENCOUNTER — APPOINTMENT (OUTPATIENT)
Dept: PULMONOLOGY | Facility: CLINIC | Age: 81
End: 2021-05-14
Payer: MEDICARE

## 2021-05-14 VITALS — TEMPERATURE: 97.9 F | OXYGEN SATURATION: 97 % | BODY MASS INDEX: 18.79 KG/M2 | HEART RATE: 62 BPM | WEIGHT: 120 LBS

## 2021-05-14 PROCEDURE — 99214 OFFICE O/P EST MOD 30 MIN: CPT

## 2021-05-14 NOTE — ASSESSMENT
[FreeTextEntry1] : Continue present bronchodilator therapy\par Follow-up CAT scan of the chest in 4-6 months.\par \par \par \par

## 2021-05-14 NOTE — PHYSICAL EXAM
[General Appearance - Well Developed] : well developed [General Appearance - Well Nourished] : well nourished [Normal Oropharynx] : normal oropharynx [Jugular Venous Distention Increased] : there was no jugular-venous distention [Heart Sounds] : normal S1 and S2 [Murmurs] : no murmurs present [Auscultation Breath Sounds / Voice Sounds] : lungs were clear to auscultation bilaterally [Lungs Percussion] : the lungs were normal to percussion [Abdomen Soft] : soft [Abdomen Tenderness] : non-tender [] : no hepato-splenomegaly [Nail Clubbing] : no clubbing of the fingernails [Cyanosis, Localized] : no localized cyanosis [No Acute Distress] : no acute distress [Supple] : supple [No JVD] : no jvd [Normal S1, S2] : normal s1, s2 [No Murmurs] : no murmurs [Clear to Auscultation Bilaterally] : clear to auscultation bilaterally [Normal to Percussion] : normal to percussion [Benign] : benign [No HSM] : no hsm [No Clubbing] : no clubbing [No Cyanosis] : no cyanosis [No Edema] : no edema [TextBox_68] : Mild prolongation of expiration

## 2021-05-14 NOTE — HISTORY OF PRESENT ILLNESS
[Never] : never [TextBox_4] : Doing well had recent ct chest. New 7 mm density. \par no sob \par cough gone \par \par on breo \par no  proair inhaler use\par \par  [TextBox_29] : cigars until 4 years ago

## 2021-05-14 NOTE — PROCEDURE
[FreeTextEntry1] : 11/13/2020\par Pulmonary function testing Nov 2020\par These data demonstrate a mild obstructive ventilatory deficit. Normal Lung Volumes. There is a moderate-severe diffusion impairment. \par \par ct 4/30 2021 reviewed and discussed\par \par \par \par \par

## 2021-05-14 NOTE — DISCUSSION/SUMMARY
[FreeTextEntry1] : Bronchiectasis stable without recent exacerbations.\par Chronic obstructive pulmonary disease stable.\par Pulmonary nodules. One new 7 mm nodule ? mucous plugging. Less likely related to malignant process.\par Other nodules stable. \par

## 2021-06-16 ENCOUNTER — RX RENEWAL (OUTPATIENT)
Age: 81
End: 2021-06-16

## 2021-07-30 ENCOUNTER — TRANSCRIPTION ENCOUNTER (OUTPATIENT)
Age: 81
End: 2021-07-30

## 2021-08-19 ENCOUNTER — APPOINTMENT (OUTPATIENT)
Dept: CARDIOLOGY | Facility: CLINIC | Age: 81
End: 2021-08-19
Payer: MEDICARE

## 2021-08-19 ENCOUNTER — NON-APPOINTMENT (OUTPATIENT)
Age: 81
End: 2021-08-19

## 2021-08-19 VITALS
OXYGEN SATURATION: 97 % | BODY MASS INDEX: 18.83 KG/M2 | DIASTOLIC BLOOD PRESSURE: 82 MMHG | HEIGHT: 67 IN | HEART RATE: 56 BPM | WEIGHT: 120 LBS | SYSTOLIC BLOOD PRESSURE: 142 MMHG | TEMPERATURE: 97.3 F

## 2021-08-19 PROCEDURE — 99215 OFFICE O/P EST HI 40 MIN: CPT

## 2021-08-19 PROCEDURE — 93000 ELECTROCARDIOGRAM COMPLETE: CPT

## 2021-08-19 NOTE — DISCUSSION/SUMMARY
[FreeTextEntry1] : Continue current meds. especially lipid meds.Lipids well controlled\par Lipid profile and HgbA1c was excellent\par Return in 6 months\par \par

## 2021-08-19 NOTE — REASON FOR VISIT
[Follow-Up - Clinic] : a clinic follow-up of [Coronary Artery Disease] : coronary artery disease [Hyperlipidemia] : hyperlipidemia [Hypertension] : hypertension [FreeTextEntry1] : I saw this 81-year-old man in followup consultation on  08/19/21\par He presented to the hospital 09/19 with chest pain and ST elevation and underwent emergent angiography, which showed moderate triple-vessel disease and a normal left ventricle.\par He was diagnosed as having pericarditis, with a pericardial rub auscultated and a CT scan showing a small pericardial effusion. He also had patchy bilateral pneumonia with a high white count and was treated with antibiotics, and colchicine for the pericarditis.\par Within 48 hours his pain was completely resolved, and his white count came down, and he was discharged on medication.\par He has been doing well with no recurrence, functioning at a high level without symptoms\par On medication his lipid profile is excellent

## 2021-08-19 NOTE — PHYSICAL EXAM
[General Appearance - Well Developed] : well developed [Normal Appearance] : normal appearance [Well Groomed] : well groomed [General Appearance - Well Nourished] : well nourished [No Deformities] : no deformities [General Appearance - In No Acute Distress] : no acute distress [Normal Conjunctiva] : the conjunctiva exhibited no abnormalities [Eyelids - No Xanthelasma] : the eyelids demonstrated no xanthelasmas [Normal Oral Mucosa] : normal oral mucosa [No Oral Pallor] : no oral pallor [No Oral Cyanosis] : no oral cyanosis [Normal Jugular Venous A Waves Present] : normal jugular venous A waves present [Normal Jugular Venous V Waves Present] : normal jugular venous V waves present [No Jugular Venous Viera A Waves] : no jugular venous viera A waves [Respiration, Rhythm And Depth] : normal respiratory rhythm and effort [Exaggerated Use Of Accessory Muscles For Inspiration] : no accessory muscle use [Auscultation Breath Sounds / Voice Sounds] : lungs were clear to auscultation bilaterally [Heart Sounds] : normal S1 and S2 [Heart Rate And Rhythm] : heart rate and rhythm were normal [Murmurs] : no murmurs present [Abdomen Soft] : soft [Abdomen Tenderness] : non-tender [Abdomen Mass (___ Cm)] : no abdominal mass palpated [Abnormal Walk] : normal gait [Gait - Sufficient For Exercise Testing] : the gait was sufficient for exercise testing [Nail Clubbing] : no clubbing of the fingernails [Cyanosis, Localized] : no localized cyanosis [Petechial Hemorrhages (___cm)] : no petechial hemorrhages [Skin Color & Pigmentation] : normal skin color and pigmentation [] : no rash [No Venous Stasis] : no venous stasis [No Skin Ulcers] : no skin ulcer [Skin Lesions] : no skin lesions [No Xanthoma] : no  xanthoma was observed [Oriented To Time, Place, And Person] : oriented to person, place, and time [Affect] : the affect was normal [Mood] : the mood was normal [No Anxiety] : not feeling anxious

## 2021-08-30 ENCOUNTER — APPOINTMENT (OUTPATIENT)
Dept: PULMONOLOGY | Facility: CLINIC | Age: 81
End: 2021-08-30

## 2021-09-01 ENCOUNTER — APPOINTMENT (OUTPATIENT)
Dept: PULMONOLOGY | Facility: CLINIC | Age: 81
End: 2021-09-01
Payer: MEDICARE

## 2021-09-01 VITALS — DIASTOLIC BLOOD PRESSURE: 78 MMHG | SYSTOLIC BLOOD PRESSURE: 160 MMHG

## 2021-09-01 VITALS — SYSTOLIC BLOOD PRESSURE: 181 MMHG | HEART RATE: 55 BPM | OXYGEN SATURATION: 99 % | DIASTOLIC BLOOD PRESSURE: 83 MMHG

## 2021-09-01 DIAGNOSIS — R07.81 PLEURODYNIA: ICD-10-CM

## 2021-09-01 PROCEDURE — 99214 OFFICE O/P EST MOD 30 MIN: CPT

## 2021-09-01 NOTE — HISTORY OF PRESENT ILLNESS
[Never] : never [TextBox_4] : Here for f/u had repeat ct chest\par no sob \par no cough\par \par on breo \par no  proair inhaler use\par \par was not aware of a fracture or any trauma, does have some discomfort on right side on and off since fall 2 year ago\par has been on Prolia every 6 months, having a f/u bone density December will be 2 years\par does play golf\par \par  [TextBox_29] : cigars until 4 years ago

## 2021-09-01 NOTE — ASSESSMENT
[FreeTextEntry1] : Continue present bronchodilator therapy\par Follow-up CAT scan of the chest in 3 to 4 months.\par Obtain lung function testing on return to office.\par \par \par \par

## 2021-09-01 NOTE — PROCEDURE
[FreeTextEntry1] : 11/13/2020\par Pulmonary function testing Nov 2020\par These data demonstrate a mild obstructive ventilatory deficit. Normal Lung Volumes. There is a moderate-severe diffusion impairment. \par \par ct 8/30/2021 reviewed and discussed\par \par CT CHEST WITHOUT IV CONTRAST [[id number]]\par Status: Final result\par Imaging Links\par Signed By\par Signed	Date/Time	\par Phone	Pager\par PEDRO RIBERA	Aug 30, 2021	 1:21 PM	[phone]	\par Study Result\par Narrative & Impression\par CT CHEST WITHOUT IV CONTRAST\par  \par CLINICAL INDICATION: New 7 mm left upper lobe nodule demonstrated on most recent CT 4/30/2021. Follow-up pulmonary nodules. Former smoker with COPD. Previously demonstrated bronchiectasis with waxing waning pulmonary opacities.\par  \par COMPARISON:   CT 4/30/2021 and earlier examinations are available\par  \par TECHNIQUE: Noncontrast chest CT was performed.  Multiplanar CT and HRCT images were reviewed.\par  \par FINDINGS: \par  \par LUNGS, AIRWAYS: Tracheal COPD changes with saber-sheath morphology and moderate mucoid debris compatible with COPD and chronic airway inflammation. There is chronic bronchiectasis and bronchial wall thickening. The lungs are hyperaerated with moderate centrilobular emphysema. Stable platelike atelectasis or fibrosis within the right upper lobe posterior segment and left upper lobe lingula. Stable biapical subpleural scarring.\par  \par A new left upper lobe medial 2.0 x 1.8 x 5.5 cm craniocaudal irregular opacity (series 4, image 107; series 601, image 33) is suspected to represent prominent atelectasis related to mucoid impaction.\par  \par A left upper lobe 7 mm nodule located medially which was new on most recent examination has resolved) expected location series 4, image 129).\par  \par Left upper lobe 6 mm nodule (series 4, image 61) is unchanged. Right lower lobe 6 mm nodule (series 4, image 240) is unchanged. A few additional more punctate nodules are unchanged. No developing discrete nodule.\par  \par PLEURA: No effusion.Mild left-sided pleural thickening with calcification, unchanged. No right-sided pleural calcification.\par  \par MEDIASTINUM, SHARON, LYMPH NODES: No developing lymphadenopathy.\par  \par HEART AND VESSELS: Cardiac size remains borderline mildly enlarged with coronary artery, aortic valvular and thoracic aortic calcification. There is a left-sided aortic arch with conventional branching; no thoracic aortic aneurysm. The main pulmonary artery is nondilated. There is no pericardial effusion.\par  \par UPPER ABDOMEN: The adrenal glands are within normal limits. Bilateral renal cysts are again noted.\par  \par BONES AND SOFT TISSUES: Right fifth rib subacute fracture with bridging callus, new. Additional healing fractures of the right 6-8th rib costochondral junctions are also new. Compression fractures of the T7 greater than L1 vertebral bodies are again demonstrated. Osteopenia. No aggressive osseous lesion.\par  \par LOWER NECK: Within normal limits other than vascular calcification.\par  \par Electronic Signature: I personally reviewed the images and agree with this report. Final Report: Dictated by  and Signed by Attending Pedro Ribera MD 8/30/2021 1:21 PM\par  \par IMPRESSION: \par  \par 1. NEW LEFT UPPER LOBE 2.0 X 1.8 X 5.5 CM IRREGULAR PLATELIKE OPACITY COMPARED TO CT 4/30/2021 IS SUSPECTED TO REPRESENT ATELECTASIS WHICH MAY BE ON THE BASIS OF MUCOID IMPACTION. NONCONTRAST CHEST CT FOLLOW-UP IS RECOMMENDED IN 3 MONTHS TO EXCLUDE AN ENLARGING OPACITY WHICH MAY WARRANT BRONCHOSCOPY. ADDITIONAL REGIONS OF CHRONIC SCARRING/ATELECTASIS. EMPHYSEMA AND COPD CHANGES WITH CHRONIC BRONCHIECTASIS AND AIRWAY THICKENING. \par  \par 2. A LEFT UPPER LOBE 7 MM NODULE WHICH WAS NEW ON APRIL 30, 2021 HAS RESOLVED IN KEEPING WITH TRANSIENT MUCOID IMPACTION. ADDITIONAL STABLE SUBCENTIMETER PULMONARY NODULES WITHOUT DEVELOPING DISCRETE NODULE.\par  \par 3. RIGHT RIB FRACTURES WHICH ARE NEW COMPARED TO 4/30/2021. HIS CORRELATE FOR TRAUMA HISTORY. STABLE VERTEBRAL (T7 AND L1) COMPRESSION FRACTURES.\par  \par  \par IMPORTANT FINDING. THIS REPORT WILL BE FLAGGED (!) IN EPIC.\par  \par \par \par \par

## 2021-09-01 NOTE — PHYSICAL EXAM
[No Acute Distress] : no acute distress [Supple] : supple [No JVD] : no jvd [Normal S1, S2] : normal s1, s2 [No Murmurs] : no murmurs [Clear to Auscultation Bilaterally] : clear to auscultation bilaterally [Normal to Percussion] : normal to percussion [Benign] : benign [No HSM] : no hsm [No Clubbing] : no clubbing [No Cyanosis] : no cyanosis [No Edema] : no edema [General Appearance - Well Developed] : well developed [General Appearance - Well Nourished] : well nourished [Normal Oropharynx] : normal oropharynx [Jugular Venous Distention Increased] : there was no jugular-venous distention [Heart Sounds] : normal S1 and S2 [Murmurs] : no murmurs present [Auscultation Breath Sounds / Voice Sounds] : lungs were clear to auscultation bilaterally [Lungs Percussion] : the lungs were normal to percussion [Abdomen Soft] : soft [] : no hepato-splenomegaly [Abdomen Tenderness] : non-tender [Nail Clubbing] : no clubbing of the fingernails [Cyanosis, Localized] : no localized cyanosis [TextBox_68] : Mild prolongation of expiration

## 2021-09-10 ENCOUNTER — RX RENEWAL (OUTPATIENT)
Age: 81
End: 2021-09-10

## 2021-09-14 ENCOUNTER — APPOINTMENT (OUTPATIENT)
Dept: PULMONOLOGY | Facility: CLINIC | Age: 81
End: 2021-09-14

## 2021-09-17 PROCEDURE — 71101 X-RAY EXAM UNILAT RIBS/CHEST: CPT | Mod: RT

## 2021-11-07 LAB — SARS-COV-2 N GENE NPH QL NAA+PROBE: NOT DETECTED

## 2021-11-08 ENCOUNTER — RX RENEWAL (OUTPATIENT)
Age: 81
End: 2021-11-08

## 2021-11-10 ENCOUNTER — APPOINTMENT (OUTPATIENT)
Dept: PULMONOLOGY | Facility: CLINIC | Age: 81
End: 2021-11-10
Payer: MEDICARE

## 2021-11-10 VITALS
OXYGEN SATURATION: 96 % | SYSTOLIC BLOOD PRESSURE: 172 MMHG | TEMPERATURE: 98 F | HEIGHT: 67 IN | RESPIRATION RATE: 15 BRPM | DIASTOLIC BLOOD PRESSURE: 69 MMHG | WEIGHT: 120 LBS | HEART RATE: 58 BPM | BODY MASS INDEX: 18.83 KG/M2

## 2021-11-10 DIAGNOSIS — S22.49XA MULTIPLE FRACTURES OF RIBS, UNSPECIFIED SIDE, INITIAL ENCOUNTER FOR CLOSED FRACTURE: ICD-10-CM

## 2021-11-10 LAB — POCT - HEMOGLOBIN (HGB), QUANTITATIVE, TRANSCUTANEOUS: 14.6

## 2021-11-10 PROCEDURE — 99214 OFFICE O/P EST MOD 30 MIN: CPT | Mod: 25

## 2021-11-10 PROCEDURE — 94729 DIFFUSING CAPACITY: CPT

## 2021-11-10 PROCEDURE — 88738 HGB QUANT TRANSCUTANEOUS: CPT

## 2021-11-10 PROCEDURE — 94060 EVALUATION OF WHEEZING: CPT

## 2021-11-10 PROCEDURE — ZZZZZ: CPT

## 2021-11-10 PROCEDURE — 94727 GAS DIL/WSHOT DETER LNG VOL: CPT

## 2021-11-11 NOTE — DISCUSSION/SUMMARY
[FreeTextEntry1] : Left upper lobe opacity decreased in size highly likely infectious inflammatory in origin.\par Other findings on CT also likely of similar etiology.\par Bronchiectasis stable without recent exacerbations.\par Chronic obstructive pulmonary disease stable clinically and functionally.\par

## 2021-11-11 NOTE — ASSESSMENT
[FreeTextEntry1] : Continue present bronchodilator therapy\par Follow-up CAT scan of the chest in 6 months. \par \par \par \par

## 2021-11-11 NOTE — PROCEDURE
[FreeTextEntry1] : Ct Nov 5th 2021 reviewed and discussed with pt\par \par 11/10/2021\par Pulmonary function testing\par These data demonstrate a mild obstructive ventilatory deficit. There is no significant bronchodilator response. There is evidence of mild hyperinflation. There is a moderate diffusion impairment. \par PFT attached relatively stable function.\par \par \par

## 2021-11-11 NOTE — PHYSICAL EXAM
[No Acute Distress] : no acute distress [Supple] : supple [No JVD] : no jvd [Normal S1, S2] : normal s1, s2 [No Murmurs] : no murmurs [Clear to Auscultation Bilaterally] : clear to auscultation bilaterally [Normal to Percussion] : normal to percussion [Benign] : benign [No HSM] : no hsm [No Clubbing] : no clubbing [No Cyanosis] : no cyanosis [No Edema] : no edema [General Appearance - Well Developed] : well developed [General Appearance - Well Nourished] : well nourished [Normal Oropharynx] : normal oropharynx [Jugular Venous Distention Increased] : there was no jugular-venous distention [Heart Sounds] : normal S1 and S2 [Murmurs] : no murmurs present [Auscultation Breath Sounds / Voice Sounds] : lungs were clear to auscultation bilaterally [Lungs Percussion] : the lungs were normal to percussion [Abdomen Soft] : soft [Abdomen Tenderness] : non-tender [] : no hepato-splenomegaly [Nail Clubbing] : no clubbing of the fingernails [Cyanosis, Localized] : no localized cyanosis [TextBox_68] : Mild prolongation of expiration

## 2021-11-11 NOTE — HISTORY OF PRESENT ILLNESS
[Never] : never [TextBox_4] : Here for f/u had repeat ct chest\par Fell 9/18/21 and had trauma to right chest. \par Then developed cough and took 2 courses of abx. \par Symptoms resolved. Now feeling well. \par no sob \par no cough\par \par on breo \par no  proair inhaler use\par \par \par \par  [TextBox_29] : cigars until 4 years ago

## 2021-12-23 ENCOUNTER — NON-APPOINTMENT (OUTPATIENT)
Age: 81
End: 2021-12-23

## 2021-12-23 ENCOUNTER — APPOINTMENT (OUTPATIENT)
Dept: NEPHROLOGY | Facility: CLINIC | Age: 81
End: 2021-12-23
Payer: MEDICARE

## 2021-12-23 VITALS
HEART RATE: 61 BPM | HEIGHT: 67 IN | BODY MASS INDEX: 18.68 KG/M2 | DIASTOLIC BLOOD PRESSURE: 81 MMHG | WEIGHT: 119 LBS | RESPIRATION RATE: 16 BRPM | OXYGEN SATURATION: 99 % | SYSTOLIC BLOOD PRESSURE: 168 MMHG

## 2021-12-23 PROCEDURE — 99205 OFFICE O/P NEW HI 60 MIN: CPT | Mod: 25

## 2021-12-23 PROCEDURE — 36415 COLL VENOUS BLD VENIPUNCTURE: CPT

## 2021-12-23 RX ORDER — ALBUTEROL SULFATE 90 UG/1
108 (90 BASE) AEROSOL, METERED RESPIRATORY (INHALATION)
Qty: 1 | Refills: 5 | Status: DISCONTINUED | COMMUNITY
Start: 2020-01-06 | End: 2021-12-23

## 2021-12-30 LAB
ALBUMIN SERPL ELPH-MCNC: 4.4 G/DL
ALDOSTERONE SERUM: 11.4 NG/DL
ANION GAP SERPL CALC-SCNC: 10 MMOL/L
APPEARANCE: CLEAR
BACTERIA: NEGATIVE
BASOPHILS # BLD AUTO: 0.06 K/UL
BASOPHILS NFR BLD AUTO: 0.7 %
BILIRUBIN URINE: NEGATIVE
BLOOD URINE: NEGATIVE
BUN SERPL-MCNC: 27 MG/DL
CALCIUM SERPL-MCNC: 9.8 MG/DL
CHLORIDE SERPL-SCNC: 104 MMOL/L
CO2 SERPL-SCNC: 26 MMOL/L
COLOR: NORMAL
CREAT SERPL-MCNC: 1.47 MG/DL
CREAT SPEC-SCNC: 80 MG/DL
CREAT/PROT UR: 0.2 RATIO
DOPAMINE UR-MCNC: <30 PG/ML
EOSINOPHIL # BLD AUTO: 1.01 K/UL
EOSINOPHIL NFR BLD AUTO: 11.6 %
EPINEPH UR-MCNC: 46 PG/ML
GLUCOSE QUALITATIVE U: NEGATIVE
GLUCOSE SERPL-MCNC: 89 MG/DL
HCT VFR BLD CALC: 48.6 %
HGB BLD-MCNC: 15.1 G/DL
HYALINE CASTS: 2 /LPF
IMM GRANULOCYTES NFR BLD AUTO: 0.3 %
KETONES URINE: NEGATIVE
LEUKOCYTE ESTERASE URINE: NEGATIVE
LYMPHOCYTES # BLD AUTO: 1.14 K/UL
LYMPHOCYTES NFR BLD AUTO: 13.1 %
MAN DIFF?: NORMAL
MCHC RBC-ENTMCNC: 31.1 GM/DL
MCHC RBC-ENTMCNC: 31.6 PG
MCV RBC AUTO: 101.7 FL
METANEPHRINE, PL: 59.7 PG/ML
MICROSCOPIC-UA: NORMAL
MONOCYTES # BLD AUTO: 0.7 K/UL
MONOCYTES NFR BLD AUTO: 8 %
NEUTROPHILS # BLD AUTO: 5.76 K/UL
NEUTROPHILS NFR BLD AUTO: 66.3 %
NITRITE URINE: NEGATIVE
NOREPINEPH UR-MCNC: 1142 PG/ML
NORMETANEPHRINE, PL: 293.9 PG/ML
PH URINE: 7
PHOSPHATE SERPL-MCNC: 3.1 MG/DL
PLATELET # BLD AUTO: 263 K/UL
POTASSIUM SERPL-SCNC: 4.8 MMOL/L
PROT UR-MCNC: 14 MG/DL
PROTEIN URINE: NORMAL
RBC # BLD: 4.78 M/UL
RBC # FLD: 14.3 %
RED BLOOD CELLS URINE: 1 /HPF
RENIN ACTIVITY, PLASMA: 3.37 NG/ML/HR
SODIUM SERPL-SCNC: 140 MMOL/L
SPECIFIC GRAVITY URINE: 1.02
SQUAMOUS EPITHELIAL CELLS: 0 /HPF
UROBILINOGEN URINE: NORMAL
WBC # FLD AUTO: 8.7 K/UL
WHITE BLOOD CELLS URINE: 0 /HPF

## 2022-01-04 ENCOUNTER — NON-APPOINTMENT (OUTPATIENT)
Age: 82
End: 2022-01-04

## 2022-01-04 ENCOUNTER — APPOINTMENT (OUTPATIENT)
Dept: CARDIOLOGY | Facility: CLINIC | Age: 82
End: 2022-01-04
Payer: MEDICARE

## 2022-01-04 VITALS
OXYGEN SATURATION: 93 % | DIASTOLIC BLOOD PRESSURE: 82 MMHG | HEIGHT: 67 IN | HEART RATE: 62 BPM | WEIGHT: 118 LBS | TEMPERATURE: 97 F | BODY MASS INDEX: 18.52 KG/M2 | SYSTOLIC BLOOD PRESSURE: 146 MMHG

## 2022-01-04 PROCEDURE — 93000 ELECTROCARDIOGRAM COMPLETE: CPT

## 2022-01-04 PROCEDURE — 99215 OFFICE O/P EST HI 40 MIN: CPT

## 2022-01-04 NOTE — REASON FOR VISIT
[Follow-Up - Clinic] : a clinic follow-up of [Coronary Artery Disease] : coronary artery disease [Hyperlipidemia] : hyperlipidemia [Hypertension] : hypertension [FreeTextEntry1] : I saw this 81-year-old man in followup consultation on  01/04/22\par He presented to the hospital 09/19 with chest pain and ST elevation and underwent emergent angiography, which showed moderate triple-vessel disease and a normal left ventricle.\par He was diagnosed as having pericarditis, with a pericardial rub auscultated and a CT scan showing a small pericardial effusion. He also had patchy bilateral pneumonia with a high white count and was treated with antibiotics, and colchicine for the pericarditis.\par Within 48 hours his pain was completely resolved, and his white count came down, and he was discharged on medication.\par He has been doing well with no recurrence, functioning at a high level without symptoms\par On medication his lipid profile is excellent\par He comes in today because he has been experiencing exertional dyspnea and excessive spikes of his blood pressure.\par He is being worked up for a renal origin of his blood pressure spikes\par His EKG today shows pauses of 2 to 3 seconds which were not there before

## 2022-01-04 NOTE — PHYSICAL EXAM
[General Appearance - Well Developed] : well developed [Normal Appearance] : normal appearance [Well Groomed] : well groomed [General Appearance - Well Nourished] : well nourished [No Deformities] : no deformities [General Appearance - In No Acute Distress] : no acute distress [Normal Conjunctiva] : the conjunctiva exhibited no abnormalities [Eyelids - No Xanthelasma] : the eyelids demonstrated no xanthelasmas [Normal Oral Mucosa] : normal oral mucosa [No Oral Pallor] : no oral pallor [No Oral Cyanosis] : no oral cyanosis [Normal Jugular Venous A Waves Present] : normal jugular venous A waves present [Normal Jugular Venous V Waves Present] : normal jugular venous V waves present [No Jugular Venous Viera A Waves] : no jugular venous viera A waves [Respiration, Rhythm And Depth] : normal respiratory rhythm and effort [Exaggerated Use Of Accessory Muscles For Inspiration] : no accessory muscle use [Auscultation Breath Sounds / Voice Sounds] : lungs were clear to auscultation bilaterally [Heart Rate And Rhythm] : heart rate and rhythm were normal [Heart Sounds] : normal S1 and S2 [Murmurs] : no murmurs present [Abdomen Soft] : soft [Abdomen Tenderness] : non-tender [Abdomen Mass (___ Cm)] : no abdominal mass palpated [Abnormal Walk] : normal gait [Gait - Sufficient For Exercise Testing] : the gait was sufficient for exercise testing [Nail Clubbing] : no clubbing of the fingernails [Cyanosis, Localized] : no localized cyanosis [Petechial Hemorrhages (___cm)] : no petechial hemorrhages [Skin Color & Pigmentation] : normal skin color and pigmentation [] : no rash [No Venous Stasis] : no venous stasis [Skin Lesions] : no skin lesions [No Skin Ulcers] : no skin ulcer [No Xanthoma] : no  xanthoma was observed [Oriented To Time, Place, And Person] : oriented to person, place, and time [Affect] : the affect was normal [Mood] : the mood was normal [No Anxiety] : not feeling anxious

## 2022-01-04 NOTE — DISCUSSION/SUMMARY
[FreeTextEntry1] : Continue current meds. especially lipid meds.Lipids well controlled\par Lipid profile and HgbA1c was excellent\par He will get a cardiac echo to reevaluate his valves\par He will get a 3-day Zio patch to evaluate the pauses seen on his resting EKG\par He will see me again in the office after testing\par \par

## 2022-01-04 NOTE — HISTORY OF PRESENT ILLNESS
[FreeTextEntry1] : he has no chest pain\par He has exertional  shortness of breath\par He has no palpitations\par He has no syncope\par He is neurologically intact\par He has no edema\par He has no GI symptoms\par

## 2022-01-06 ENCOUNTER — APPOINTMENT (OUTPATIENT)
Dept: CT IMAGING | Facility: CLINIC | Age: 82
End: 2022-01-06
Payer: MEDICARE

## 2022-01-06 ENCOUNTER — OUTPATIENT (OUTPATIENT)
Dept: OUTPATIENT SERVICES | Facility: HOSPITAL | Age: 82
LOS: 1 days | End: 2022-01-06
Payer: MEDICARE

## 2022-01-06 DIAGNOSIS — I10 ESSENTIAL (PRIMARY) HYPERTENSION: ICD-10-CM

## 2022-01-06 PROCEDURE — 74170 CT ABD WO CNTRST FLWD CNTRST: CPT | Mod: 26,MG

## 2022-01-06 PROCEDURE — G1004: CPT

## 2022-01-06 PROCEDURE — 82565 ASSAY OF CREATININE: CPT

## 2022-01-06 PROCEDURE — 74170 CT ABD WO CNTRST FLWD CNTRST: CPT | Mod: MG

## 2022-01-12 ENCOUNTER — APPOINTMENT (OUTPATIENT)
Dept: CARDIOLOGY | Facility: CLINIC | Age: 82
End: 2022-01-12
Payer: MEDICARE

## 2022-01-12 PROCEDURE — 93306 TTE W/DOPPLER COMPLETE: CPT

## 2022-01-12 PROCEDURE — 93015 CV STRESS TEST SUPVJ I&R: CPT

## 2022-01-13 ENCOUNTER — APPOINTMENT (OUTPATIENT)
Dept: SURGICAL ONCOLOGY | Facility: CLINIC | Age: 82
End: 2022-01-13
Payer: MEDICARE

## 2022-01-13 VITALS
BODY MASS INDEX: 19.66 KG/M2 | SYSTOLIC BLOOD PRESSURE: 165 MMHG | RESPIRATION RATE: 98 BRPM | HEIGHT: 65 IN | TEMPERATURE: 98.1 F | DIASTOLIC BLOOD PRESSURE: 82 MMHG | HEART RATE: 82 BPM | WEIGHT: 118 LBS

## 2022-01-13 PROCEDURE — 99203 OFFICE O/P NEW LOW 30 MIN: CPT

## 2022-01-18 LAB
5OH-INDOLEACETATE 24H UR-MRATE: 8.8 MG/24 H
5OH-INDOLEACETATE UR-MCNC: 1.15 G/24 H
DOPAMINE UR-MCNC: 111 UG/L
DOPAMINE, UR, 24HR: 150 UG/24 HR
EPINEPH UR-MCNC: 2 UG/L
EPINEPHRINE, U, 24HR: 3 UG/24 HR
METANEPH 24H UR-MRATE: 238 UG/24 HR
METANEPHS 24H UR-MCNC: 176 UG/L
NOREPINEPH UR-MCNC: 36 UG/L
NOREPINEPHRINE,U,24H: 49 UG/24 HR
NORMETANEPHRINE 24 HR URINE: 459 UG/24 HR
NORMETANEPHRINE 24H UR-MCNC: 340 UG/L
SPECIMEN VOL 24H UR: 1350 ML

## 2022-01-20 LAB
VMA 24H UR-MCNC: 3.4 MG/L
VMA SERPL-MCNC: 4.6 MG/24 HR

## 2022-01-25 ENCOUNTER — APPOINTMENT (OUTPATIENT)
Dept: CARDIOLOGY | Facility: CLINIC | Age: 82
End: 2022-01-25

## 2022-01-28 NOTE — PHYSICAL EXAM
[Normal] : supple, no neck mass and thyroid not enlarged [Normal Neck Lymph Nodes] : normal neck lymph nodes  [Normal Supraclavicular Lymph Nodes] : normal supraclavicular lymph nodes [Normal Groin Lymph Nodes] : normal groin lymph nodes [Normal Axillary Lymph Nodes] : normal axillary lymph nodes [Normal] : oriented to person, place and time, with appropriate affect (0) swallows foods/liquids without difficulty

## 2022-01-28 NOTE — HISTORY OF PRESENT ILLNESS
[de-identified] : Mr. Solis is an 81 y/o male who presents with new onset severe hypertension.\par Blood work 12/15/2021 demonstrated elevated free metanephrine level at 92 pg/mL.\par Follow up laboratory investigation by Dr. 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  He is referred for evaluation for left adrenalectomy.\par Currently waiting for results of 24 hour urine test.\par He feels well overall and reports paroxysmal episodes of hypertension, but currently denies headache, dizziness.

## 2022-01-28 NOTE — ASSESSMENT
[FreeTextEntry1] : 81 y/o male with new hypertension and small left adrenal mass.\par I explained most pheochromocytomas are discovered at larger size.\par Unclear if this represents a pheochromocytoma as lesion was present one previous imaging when he was not hypertensive.\par Await results of urine collection.\par If confirmed pheochromocytoma, he will benefit from minimally invasive left adrenalectomy.\par Will discuss with nephrology once workup completed.\par Discussed with patient in detail.

## 2022-04-01 ENCOUNTER — TRANSCRIPTION ENCOUNTER (OUTPATIENT)
Age: 82
End: 2022-04-01

## 2022-04-05 ENCOUNTER — RX RENEWAL (OUTPATIENT)
Age: 82
End: 2022-04-05

## 2022-04-06 ENCOUNTER — APPOINTMENT (OUTPATIENT)
Dept: PULMONOLOGY | Facility: CLINIC | Age: 82
End: 2022-04-06

## 2022-04-29 ENCOUNTER — APPOINTMENT (OUTPATIENT)
Dept: PULMONOLOGY | Facility: CLINIC | Age: 82
End: 2022-04-29
Payer: MEDICARE

## 2022-04-29 VITALS
TEMPERATURE: 97.9 F | SYSTOLIC BLOOD PRESSURE: 137 MMHG | HEART RATE: 52 BPM | DIASTOLIC BLOOD PRESSURE: 72 MMHG | OXYGEN SATURATION: 98 %

## 2022-04-29 PROCEDURE — 99214 OFFICE O/P EST MOD 30 MIN: CPT | Mod: 25

## 2022-04-29 PROCEDURE — 94010 BREATHING CAPACITY TEST: CPT

## 2022-04-29 NOTE — PROCEDURE
[FreeTextEntry1] : Ct April 25 th 2022 reviewed and discussed with pt 1 new 5 mm nodule.  Other areas improved.  Will follow.\par \par 04/29/2022\par Pulmonary function testing\par These data demonstrate a mild obstructive ventilatory deficit. \par PFT attached relatively stable function.\par \par \par

## 2022-04-29 NOTE — HISTORY OF PRESENT ILLNESS
[Never] : never [TextBox_4] : was hospitalized [facility] April 2 for 4 days after developing ?infection left lower leg, no clot had bad reaction to Levaquin, dizziness and pseudoparalysis of left shoulder, changed antibiotics with resolution.\par \par had recent ct chest\par no sob \par no cough\par \par on breo \par no  proair inhaler use\par \par Saw endo no pheo.\par \par \par  [TextBox_29] : cigars until 4 years ago

## 2022-04-29 NOTE — ASSESSMENT
[FreeTextEntry1] : Continue present bronchodilator therapy\par Follow-up CAT scan of the chest in 6 months.\par Follow-up post CT or sooner on a as needed basis.\par \par 35 minutes spent in evaluation management and review of studies.\par \par

## 2022-06-25 ENCOUNTER — OUTPATIENT (OUTPATIENT)
Dept: OUTPATIENT SERVICES | Facility: HOSPITAL | Age: 82
LOS: 1 days | End: 2022-06-25

## 2022-06-25 DIAGNOSIS — Z29.8 ENCOUNTER FOR OTHER SPECIFIED PROPHYLACTIC MEASURES: ICD-10-CM

## 2022-08-17 ENCOUNTER — RX RENEWAL (OUTPATIENT)
Age: 82
End: 2022-08-17

## 2022-10-17 ENCOUNTER — APPOINTMENT (OUTPATIENT)
Dept: PULMONOLOGY | Facility: CLINIC | Age: 82
End: 2022-10-17

## 2022-10-17 VITALS — OXYGEN SATURATION: 98 % | DIASTOLIC BLOOD PRESSURE: 65 MMHG | SYSTOLIC BLOOD PRESSURE: 138 MMHG | HEART RATE: 48 BPM

## 2022-10-17 DIAGNOSIS — E27.8 OTHER SPECIFIED DISORDERS OF ADRENAL GLAND: ICD-10-CM

## 2022-10-17 LAB — POCT - HEMOGLOBIN (HGB), QUANTITATIVE, TRANSCUTANEOUS: 12.1

## 2022-10-17 PROCEDURE — 99213 OFFICE O/P EST LOW 20 MIN: CPT | Mod: 25

## 2022-10-17 PROCEDURE — 94729 DIFFUSING CAPACITY: CPT

## 2022-10-17 PROCEDURE — 88738 HGB QUANT TRANSCUTANEOUS: CPT

## 2022-10-17 PROCEDURE — 94726 PLETHYSMOGRAPHY LUNG VOLUMES: CPT

## 2022-10-17 PROCEDURE — 94060 EVALUATION OF WHEEZING: CPT

## 2022-10-17 RX ORDER — FLUTICASONE FUROATE AND VILANTEROL TRIFENATATE 100; 25 UG/1; UG/1
100-25 POWDER RESPIRATORY (INHALATION)
Qty: 1 | Refills: 5 | Status: DISCONTINUED | COMMUNITY
Start: 2018-07-23 | End: 2022-10-17

## 2022-10-17 RX ORDER — TRIAMTERENE AND HYDROCHLOROTHIAZIDE 25; 37.5 MG/1; MG/1
37.5-25 TABLET ORAL
Qty: 30 | Refills: 0 | Status: DISCONTINUED | COMMUNITY
Start: 2021-12-03 | End: 2022-10-17

## 2022-10-17 RX ORDER — TRIAMTERENE AND HYDROCHLOROTHIAZIDE 37.5; 25 MG/1; MG/1
37.5-25 CAPSULE ORAL
Qty: 90 | Refills: 0 | Status: DISCONTINUED | COMMUNITY
Start: 2021-12-16 | End: 2022-10-17

## 2022-10-17 RX ORDER — AMLODIPINE BESYLATE 10 MG/1
10 TABLET ORAL DAILY
Qty: 90 | Refills: 3 | Status: DISCONTINUED | COMMUNITY
Start: 2021-12-16 | End: 2022-10-17

## 2022-10-17 NOTE — DISCUSSION/SUMMARY
[FreeTextEntry1] : CT findings likely infectious inflammatory in origin.  We will continue to follow.\par Bronchiectasis stable without recent exacerbations.\par Chronic obstructive pulmonary disease stable clinically.\par

## 2022-10-17 NOTE — HISTORY OF PRESENT ILLNESS
[Never] : never [TextBox_4] : was hospitalized [facility] April 2 for 4 days after developing ?infection left lower leg, no clot had bad reaction to Levaquin, dizziness and pseudoparalysis of left shoulder, changed antibiotics with resolution.\par \par had recent ct chest\par no sob \par no cough\par No recent respiratory infections.\par on breo \par Occ beta agonist use. \par \par \par \par  [TextBox_29] : cigars until 4 years ago

## 2022-10-17 NOTE — PROCEDURE
[FreeTextEntry1] : 10/17/2022\par Pulmonary function testing\par These data demonstrate a mild obstructive ventilatory deficit. There is no significant bronchodilator response. Normal Lung Volumes. Airway resistance is mildly increased. There is a moderate diffusion impairment. \par Compared to November 2021 there is a mild decrease in flow rates and stability of diffusion capacity.\par \par

## 2022-10-17 NOTE — CONSULT LETTER
[Please see my note below.] : Please see my note below. [Dear  ___] : Dear ~WENDY, [Consult Letter:] : I had the pleasure of evaluating your patient, [unfilled]. [Consult Closing:] : Thank you very much for allowing me to participate in the care of this patient.  If you have any questions, please do not hesitate to contact me. [Sincerely,] : Sincerely, [FreeTextEntry2] : Cresencio Luna MD [FreeTextEntry3] : Navdeep Solorio MD FCCP\par

## 2023-01-19 ENCOUNTER — NON-APPOINTMENT (OUTPATIENT)
Age: 83
End: 2023-01-19

## 2023-01-19 ENCOUNTER — APPOINTMENT (OUTPATIENT)
Dept: CARDIOLOGY | Facility: CLINIC | Age: 83
End: 2023-01-19
Payer: MEDICARE

## 2023-01-19 VITALS
DIASTOLIC BLOOD PRESSURE: 60 MMHG | TEMPERATURE: 97 F | WEIGHT: 114 LBS | SYSTOLIC BLOOD PRESSURE: 132 MMHG | HEIGHT: 65 IN | OXYGEN SATURATION: 94 % | BODY MASS INDEX: 18.99 KG/M2 | HEART RATE: 49 BPM

## 2023-01-19 DIAGNOSIS — J86.9 PYOTHORAX W/OUT FISTULA: ICD-10-CM

## 2023-01-19 PROCEDURE — 93000 ELECTROCARDIOGRAM COMPLETE: CPT

## 2023-01-19 PROCEDURE — 93306 TTE W/DOPPLER COMPLETE: CPT

## 2023-01-19 PROCEDURE — 99215 OFFICE O/P EST HI 40 MIN: CPT

## 2023-01-19 RX ORDER — DOXAZOSIN 2 MG/1
2 TABLET ORAL
Qty: 90 | Refills: 3 | Status: DISCONTINUED | COMMUNITY
Start: 2022-01-18 | End: 2023-01-19

## 2023-01-19 NOTE — REASON FOR VISIT
[Follow-Up - Clinic] : a clinic follow-up of [Coronary Artery Disease] : coronary artery disease [Hyperlipidemia] : hyperlipidemia [Hypertension] : hypertension [FreeTextEntry1] : I saw this 82-year-old man in followup consultation on  01/19/23\par Going through an extensive work-up to try and diagnosed with he has an adrenal tumor, and as a result has been put on large doses of beta-blocker.  His main complaint is fatigue and shortness of breath on activity and his resting heart rate is 49.\par I will start to reduce his Coreg, to allow him to increase his heart rate with activity.\par We will repeat the echo to reassess his aortic stenosis\par We will see him again after lowering the beta-blocker and the echo\par \par \par He presented to the hospital 09/19 with chest pain and ST elevation and underwent emergent angiography, which showed moderate triple-vessel disease and a normal left ventricle.\par He was diagnosed as having pericarditis, with a pericardial rub auscultated and a CT scan showing a small pericardial effusion. He also had patchy bilateral pneumonia with a high white count and was treated with antibiotics, and colchicine for the pericarditis.\par Within 48 hours his pain was completely resolved, and his white count came down, and he was discharged on medication.\par He has been doing well with no recurrence, functioning at a high level without symptoms\par On medication his lipid profile is excellent\par He comes in today because he has been experiencing exertional dyspnea and excessive spikes of his blood pressure.\par He is being worked up for a renal origin of his blood pressure spikes\par His EKG today shows pauses of 2 to 3 seconds which were not there before

## 2023-01-19 NOTE — HISTORY OF PRESENT ILLNESS
Pt states has had left flank and back pain for about a month. States also has frequency at night with no or small amount of urine. Denies trauma or injury.  Pt states is just getting worse. Denies fever. Tender to palpation to left flank and lower back. No bruising or redness noted to area.  Armband checked, patient verified. Verified by spelling and stated name on armband along with .   Patient sitting up in bed, no acute distress noted, awake, alert, and oriented x 3, calm, respirations even and unlabored, and skin is warm and dry. Patient verbalized understanding of status and plan of care.   
[FreeTextEntry1] : he has no chest pain\par He has exertional  shortness of breath\par He has no palpitations\par He has no syncope\par He is neurologically intact\par He has no edema\par He has no GI symptoms\par

## 2023-01-19 NOTE — DISCUSSION/SUMMARY
[FreeTextEntry1] : Continue current meds. especially lipid meds.Lipids well controlled\par Lipid profile and HgbA1c was excellent\par He will get a cardiac echo to reevaluate his valves\par He will titrate down his Coreg to see if he has less fatigue on a lower dose\par He will see me again in the office after testing\par \par

## 2023-01-30 ENCOUNTER — APPOINTMENT (OUTPATIENT)
Dept: CARDIOLOGY | Facility: CLINIC | Age: 83
End: 2023-01-30

## 2023-01-31 ENCOUNTER — APPOINTMENT (OUTPATIENT)
Dept: CARDIOLOGY | Facility: CLINIC | Age: 83
End: 2023-01-31
Payer: MEDICARE

## 2023-01-31 VITALS
SYSTOLIC BLOOD PRESSURE: 140 MMHG | WEIGHT: 114 LBS | OXYGEN SATURATION: 98 % | TEMPERATURE: 97 F | BODY MASS INDEX: 18.99 KG/M2 | HEART RATE: 55 BPM | HEIGHT: 65 IN | DIASTOLIC BLOOD PRESSURE: 60 MMHG

## 2023-01-31 PROCEDURE — 99215 OFFICE O/P EST HI 40 MIN: CPT

## 2023-01-31 NOTE — DISCUSSION/SUMMARY
[FreeTextEntry1] : Continue current meds. especially lipid meds.Lipids well controlled\par Lipid profile and HgbA1c was excellent\par  a cardiac echo to reevaluate his valves was unchanged\par He will titrate down his Coreg to see if he has less fatigue on a lower dose. 6.25mg alternate days\par He will see me again in the office in 3 months\par \par

## 2023-01-31 NOTE — REASON FOR VISIT
[Follow-Up - Clinic] : a clinic follow-up of [Coronary Artery Disease] : coronary artery disease [Hyperlipidemia] : hyperlipidemia [Hypertension] : hypertension [FreeTextEntry1] : I saw this 83-year-old man in followup consultation on  01/31/23\par Going through an extensive work-up to try and diagnosed with he has an adrenal tumor, and as a result has been put on large doses of beta-blocker.  His main complaint is fatigue and shortness of breath on activity and his resting heart rate is 49.\par I will start to reduce his Coreg, to allow him to increase his heart rate with activity.This improved to 55 so I will reduce the Coreg further to alternate day 1 dose of -6.5 mg\par If his blood pressure remains elevated after this he will increase the Norvasc to 5 mg daily\par \par We will repeat the echo to reassess his aortic stenosis.  This was unchanged\par \par \par He presented to the hospital 09/19 with chest pain and ST elevation and underwent emergent angiography, which showed moderate triple-vessel disease and a normal left ventricle.\par He was diagnosed as having pericarditis, with a pericardial rub auscultated and a CT scan showing a small pericardial effusion. He also had patchy bilateral pneumonia with a high white count and was treated with antibiotics, and colchicine for the pericarditis.\par Within 48 hours his pain was completely resolved, and his white count came down, and he was discharged on medication.\par He has been doing well with no recurrence, functioning at a high level without symptoms\par On medication his lipid profile is excellent\par He comes in today because he has been experiencing exertional dyspnea and excessive spikes of his blood pressure.\par He is being worked up for a renal origin of his blood pressure spikes\par His EKG today shows pauses of 2 to 3 seconds which were not there before

## 2023-01-31 NOTE — HISTORY OF PRESENT ILLNESS
[FreeTextEntry1] : he has no chest pain\par He has exertional  shortness of breath\par He has no palpitations\par He has no syncope\par He is neurologically intact\par He has no edema\par He has no GI symptoms\par Less fatigue

## 2023-02-14 ENCOUNTER — NON-APPOINTMENT (OUTPATIENT)
Age: 83
End: 2023-02-14

## 2023-05-23 ENCOUNTER — APPOINTMENT (OUTPATIENT)
Dept: PULMONOLOGY | Facility: CLINIC | Age: 83
End: 2023-05-23
Payer: MEDICARE

## 2023-05-23 VITALS — OXYGEN SATURATION: 98 % | HEART RATE: 57 BPM | DIASTOLIC BLOOD PRESSURE: 70 MMHG | SYSTOLIC BLOOD PRESSURE: 124 MMHG

## 2023-05-23 LAB — POCT - HEMOGLOBIN (HGB), QUANTITATIVE, TRANSCUTANEOUS: 13.4

## 2023-05-23 PROCEDURE — 94727 GAS DIL/WSHOT DETER LNG VOL: CPT

## 2023-05-23 PROCEDURE — 94729 DIFFUSING CAPACITY: CPT

## 2023-05-23 PROCEDURE — 88738 HGB QUANT TRANSCUTANEOUS: CPT

## 2023-05-23 PROCEDURE — 94010 BREATHING CAPACITY TEST: CPT

## 2023-05-23 PROCEDURE — 99214 OFFICE O/P EST MOD 30 MIN: CPT | Mod: 25

## 2023-05-23 PROCEDURE — ZZZZZ: CPT

## 2023-05-25 NOTE — PHYSICAL EXAM
[No Acute Distress] : no acute distress [Supple] : supple [No JVD] : no jvd [Normal S1, S2] : normal s1, s2 [No Murmurs] : no murmurs [Clear to Auscultation Bilaterally] : clear to auscultation bilaterally [Normal to Percussion] : normal to percussion [Benign] : benign [No HSM] : no hsm [No Clubbing] : no clubbing [No Cyanosis] : no cyanosis [No Edema] : no edema [General Appearance - Well Developed] : well developed [General Appearance - Well Nourished] : well nourished [Normal Oropharynx] : normal oropharynx [Jugular Venous Distention Increased] : there was no jugular-venous distention [Murmurs] : no murmurs present [Heart Sounds] : normal S1 and S2 [Auscultation Breath Sounds / Voice Sounds] : lungs were clear to auscultation bilaterally [Lungs Percussion] : the lungs were normal to percussion [Abdomen Soft] : soft [Abdomen Tenderness] : non-tender [] : no hepato-splenomegaly [Nail Clubbing] : no clubbing of the fingernails [Cyanosis, Localized] : no localized cyanosis [TextBox_68] : Mild prolongation of expiration.  No active wheezing.

## 2023-05-25 NOTE — DISCUSSION/SUMMARY
[FreeTextEntry1] : CT findings likely infectious inflammatory in origin.  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

## 2023-05-25 NOTE — ASSESSMENT
[FreeTextEntry1] : Continue present bronchodilator therapy\par Follow-up in 6 months or sooner on a PRN basis.\par CT  4 months. task sent as reminder\par Pulmonary toilet.\par \par 35 minutes spent in evaluation management and review of studies.\par \par

## 2023-05-25 NOTE — PROCEDURE
[FreeTextEntry1] : CT 5/18/23 reviewed and discussed.  Compared to prior studies.\par New subpleural density RUL.\par \par \par 05/23/2023\par Pulmonary function testing\par These data demonstrate a mild obstructive ventilatory deficit. Normal Lung Volumes. There is a moderate-severe diffusion impairment. \par PFT attached relatively stable function.\par \par \par \par \par

## 2023-05-30 ENCOUNTER — NON-APPOINTMENT (OUTPATIENT)
Age: 83
End: 2023-05-30

## 2023-05-30 ENCOUNTER — APPOINTMENT (OUTPATIENT)
Dept: CARDIOLOGY | Facility: CLINIC | Age: 83
End: 2023-05-30
Payer: MEDICARE

## 2023-05-30 VITALS
BODY MASS INDEX: 18.83 KG/M2 | WEIGHT: 113 LBS | HEART RATE: 70 BPM | HEIGHT: 65 IN | OXYGEN SATURATION: 100 % | DIASTOLIC BLOOD PRESSURE: 70 MMHG | SYSTOLIC BLOOD PRESSURE: 120 MMHG

## 2023-05-30 DIAGNOSIS — H61.019: ICD-10-CM

## 2023-05-30 DIAGNOSIS — Z00.00 ENCOUNTER FOR GENERAL ADULT MEDICAL EXAMINATION W/OUT ABNORMAL FINDINGS: ICD-10-CM

## 2023-05-30 PROCEDURE — 93000 ELECTROCARDIOGRAM COMPLETE: CPT

## 2023-05-30 PROCEDURE — 99215 OFFICE O/P EST HI 40 MIN: CPT

## 2023-05-30 RX ORDER — FLUTICASONE FUROATE AND VILANTEROL 100; 25 UG/1; UG/1
100-25 POWDER RESPIRATORY (INHALATION)
Qty: 180 | Refills: 3 | Status: DISCONTINUED | COMMUNITY
Start: 2022-08-17 | End: 2023-05-30

## 2023-05-30 NOTE — REASON FOR VISIT
[Follow-Up - Clinic] : a clinic follow-up of [Coronary Artery Disease] : coronary artery disease [Hyperlipidemia] : hyperlipidemia [Hypertension] : hypertension [FreeTextEntry1] : I saw this 83-year-old man in followup consultation on  05/30/23\par Going through an extensive work-up to try and diagnosed with he has an adrenal tumor, and as a result has been put on large doses of beta-blocker.  His main complaint is fatigue and shortness of breath on activity and his resting heart rate is 49.\par I will start to reduce his Coreg, to allow him to increase his heart rate with activity.This improved to 55 so I will reduce the Coreg further to alternate day 1 dose of -6.5 mg\par If his blood pressure remains elevated after this he will increase the Norvasc to 10 mg daily\par \par We will repeat the echo to reassess his aortic stenosis.  This was unchanged\par \par \par He presented to the hospital 09/19 with chest pain and ST elevation and underwent emergent angiography, which showed moderate triple-vessel disease and a normal left ventricle.\par He was diagnosed as having pericarditis, with a pericardial rub auscultated and a CT scan showing a small pericardial effusion. He also had patchy bilateral pneumonia with a high white count and was treated with antibiotics, and colchicine for the pericarditis.\par Within 48 hours his pain was completely resolved, and his white count came down, and he was discharged on medication.\par He has been doing well with no recurrence, functioning at a high level without symptoms\par On medication his lipid profile is excellent\par He comes in today because he has been experiencing exertional dyspnea and excessive spikes of his blood pressure.\par He is being worked up for a renal origin of his blood pressure spikes\par

## 2023-05-30 NOTE — DISCUSSION/SUMMARY
[FreeTextEntry1] : Continue current meds. especially lipid meds.Lipids well controlled\par Lipid profile and HgbA1c was excellent\par  a cardiac echo to reevaluate his valves was unchanged. Will repeat Jan 24\par He will titrate down his Coreg to see if he has less fatigue on a lower dose. 6.25mg alternate days\par He will see me again in the office in 4 months\par Will schedule a coronary CTA after the next visit\par \par

## 2023-09-19 ENCOUNTER — NON-APPOINTMENT (OUTPATIENT)
Age: 83
End: 2023-09-19

## 2023-09-26 ENCOUNTER — APPOINTMENT (OUTPATIENT)
Dept: CARDIOLOGY | Facility: CLINIC | Age: 83
End: 2023-09-26
Payer: MEDICARE

## 2023-09-26 VITALS
WEIGHT: 115 LBS | SYSTOLIC BLOOD PRESSURE: 132 MMHG | BODY MASS INDEX: 19.14 KG/M2 | DIASTOLIC BLOOD PRESSURE: 60 MMHG | HEART RATE: 56 BPM | OXYGEN SATURATION: 95 %

## 2023-09-26 DIAGNOSIS — R91.1 SOLITARY PULMONARY NODULE: ICD-10-CM

## 2023-09-26 PROCEDURE — 99215 OFFICE O/P EST HI 40 MIN: CPT

## 2023-10-12 ENCOUNTER — NON-APPOINTMENT (OUTPATIENT)
Age: 83
End: 2023-10-12

## 2023-11-06 ENCOUNTER — APPOINTMENT (OUTPATIENT)
Dept: PULMONOLOGY | Facility: CLINIC | Age: 83
End: 2023-11-06
Payer: MEDICARE

## 2023-11-06 VITALS
HEART RATE: 61 BPM | RESPIRATION RATE: 16 BRPM | OXYGEN SATURATION: 98 % | DIASTOLIC BLOOD PRESSURE: 76 MMHG | SYSTOLIC BLOOD PRESSURE: 144 MMHG

## 2023-11-06 PROCEDURE — 99214 OFFICE O/P EST MOD 30 MIN: CPT

## 2023-11-30 ENCOUNTER — APPOINTMENT (OUTPATIENT)
Dept: CARDIOLOGY | Facility: CLINIC | Age: 83
End: 2023-11-30
Payer: MEDICARE

## 2023-11-30 ENCOUNTER — NON-APPOINTMENT (OUTPATIENT)
Age: 83
End: 2023-11-30

## 2023-11-30 VITALS
OXYGEN SATURATION: 98 % | HEART RATE: 57 BPM | WEIGHT: 114 LBS | HEIGHT: 65 IN | BODY MASS INDEX: 18.99 KG/M2 | DIASTOLIC BLOOD PRESSURE: 68 MMHG | SYSTOLIC BLOOD PRESSURE: 120 MMHG

## 2023-11-30 PROCEDURE — 93000 ELECTROCARDIOGRAM COMPLETE: CPT

## 2023-11-30 PROCEDURE — 99215 OFFICE O/P EST HI 40 MIN: CPT

## 2024-01-23 ENCOUNTER — APPOINTMENT (OUTPATIENT)
Dept: CARDIOLOGY | Facility: CLINIC | Age: 84
End: 2024-01-23
Payer: MEDICARE

## 2024-01-23 VITALS
OXYGEN SATURATION: 95 % | HEART RATE: 55 BPM | SYSTOLIC BLOOD PRESSURE: 138 MMHG | BODY MASS INDEX: 19.16 KG/M2 | HEIGHT: 65 IN | DIASTOLIC BLOOD PRESSURE: 62 MMHG | WEIGHT: 115 LBS

## 2024-01-23 LAB — HBA1C MFR BLD HPLC: 5.6

## 2024-01-23 PROCEDURE — 99215 OFFICE O/P EST HI 40 MIN: CPT

## 2024-01-23 RX ORDER — ATORVASTATIN CALCIUM 80 MG/1
80 TABLET, FILM COATED ORAL
Qty: 90 | Refills: 2 | Status: DISCONTINUED | COMMUNITY
Start: 2022-05-26 | End: 2024-01-23

## 2024-01-23 NOTE — DISCUSSION/SUMMARY
[FreeTextEntry1] : Continue current meds. especially lipid meds.Lipids were well controlled.,  But he became intolerant to the statins and his numbers alison.  I will not prescribe Repatha.  However he thinks his joint pains may be from his statin which he will stop for a month to see if he feels better.  If he does I will switch him to Repatha. Lipid profile and HgbA1c was excellent  a cardiac echo to reevaluate his valves was unchanged. Will repeat Jan 24 He will titrate down his Coreg to see if he has less fatigue on a lower dose. 6.25mg alternate days He will see me again in the office in 4 months Will schedule a coronary CTA after the next visit if his renal function ok.  His renal function was not improved so we will not order a CT In March when he gets back from his vacation we will arrange a CTA with fluid loading before the contrast injection.

## 2024-01-23 NOTE — PHYSICAL EXAM
[General Appearance - Well Developed] : well developed [Normal Appearance] : normal appearance [Well Groomed] : well groomed [General Appearance - Well Nourished] : well nourished [No Deformities] : no deformities [General Appearance - In No Acute Distress] : no acute distress [Normal Conjunctiva] : the conjunctiva exhibited no abnormalities [Eyelids - No Xanthelasma] : the eyelids demonstrated no xanthelasmas [Normal Oral Mucosa] : normal oral mucosa [No Oral Pallor] : no oral pallor [No Oral Cyanosis] : no oral cyanosis [Normal Jugular Venous A Waves Present] : normal jugular venous A waves present [Normal Jugular Venous V Waves Present] : normal jugular venous V waves present [No Jugular Venous Viera A Waves] : no jugular venous viera A waves [Respiration, Rhythm And Depth] : normal respiratory rhythm and effort [Exaggerated Use Of Accessory Muscles For Inspiration] : no accessory muscle use [Auscultation Breath Sounds / Voice Sounds] : lungs were clear to auscultation bilaterally [Heart Rate And Rhythm] : heart rate and rhythm were normal [Heart Sounds] : normal S1 and S2 [Murmurs] : no murmurs present [Abdomen Soft] : soft [Abdomen Tenderness] : non-tender [Abdomen Mass (___ Cm)] : no abdominal mass palpated [Abnormal Walk] : normal gait [Gait - Sufficient For Exercise Testing] : the gait was sufficient for exercise testing [Nail Clubbing] : no clubbing of the fingernails [Cyanosis, Localized] : no localized cyanosis [Petechial Hemorrhages (___cm)] : no petechial hemorrhages [] : no rash [Skin Color & Pigmentation] : normal skin color and pigmentation [No Venous Stasis] : no venous stasis [Skin Lesions] : no skin lesions [No Skin Ulcers] : no skin ulcer [No Xanthoma] : no  xanthoma was observed [Oriented To Time, Place, And Person] : oriented to person, place, and time [Affect] : the affect was normal [Mood] : the mood was normal [No Anxiety] : not feeling anxious

## 2024-01-23 NOTE — REASON FOR VISIT
[FreeTextEntry1] : I saw this 83-year-old man in followup consultation on  01/23/24 Going through an extensive work-up to try and diagnosed if he has an adrenal tumor, and as a result has been put on large doses of beta-blocker.  His main complaint is fatigue and shortness of breath on activity and his resting heart rate is 49. I will start to reduce his Coreg, to allow him to increase his heart rate with activity.This improved to 55 so I will reduce the Coreg further to alternate day 1 dose of -6.5 mg If his blood pressure remains elevated after this he will increase the Norvasc to 10 mg daily  We will repeat the echo to reassess his aortic stenosis.  This was unchanged Was planning a CTA but have to watch his renal function. Not improved   He presented to the hospital 09/19 with chest pain and ST elevation and underwent emergent angiography, which showed moderate triple-vessel disease and a normal left ventricle. He was diagnosed as having pericarditis, with a pericardial rub auscultated and a CT scan showing a small pericardial effusion. He also had patchy bilateral pneumonia with a high white count and was treated with antibiotics, and colchicine for the pericarditis. Within 48 hours his pain was completely resolved, and his white count came down, and he was discharged on medication. He has been doing well with no recurrence, functioning at a high level without symptoms On medication his lipid profile is excellent He comes in today because he has been experiencing exertional dyspnea and excessive spikes of his blood pressure. He is being worked up for a renal origin of his blood pressure spikes He stopped his statin which he was intolerant to, and I will prescribe Repatha.  [Follow-Up - Clinic] : a clinic follow-up of [Coronary Artery Disease] : coronary artery disease [Hyperlipidemia] : hyperlipidemia [Hypertension] : hypertension

## 2024-01-29 ENCOUNTER — NON-APPOINTMENT (OUTPATIENT)
Age: 84
End: 2024-01-29

## 2024-01-29 ENCOUNTER — APPOINTMENT (OUTPATIENT)
Dept: PULMONOLOGY | Facility: CLINIC | Age: 84
End: 2024-01-29
Payer: MEDICARE

## 2024-01-29 VITALS — HEART RATE: 58 BPM | OXYGEN SATURATION: 95 % | DIASTOLIC BLOOD PRESSURE: 65 MMHG | SYSTOLIC BLOOD PRESSURE: 110 MMHG

## 2024-01-29 LAB — POCT - HEMOGLOBIN (HGB), QUANTITATIVE, TRANSCUTANEOUS: 13.4

## 2024-01-29 PROCEDURE — 94727 GAS DIL/WSHOT DETER LNG VOL: CPT

## 2024-01-29 PROCEDURE — 94010 BREATHING CAPACITY TEST: CPT

## 2024-01-29 PROCEDURE — 94729 DIFFUSING CAPACITY: CPT

## 2024-01-29 PROCEDURE — 88738 HGB QUANT TRANSCUTANEOUS: CPT

## 2024-01-29 PROCEDURE — 99213 OFFICE O/P EST LOW 20 MIN: CPT | Mod: 25

## 2024-01-29 PROCEDURE — ZZZZZ: CPT

## 2024-01-29 NOTE — PHYSICAL EXAM
[No Acute Distress] : no acute distress [Supple] : supple [No JVD] : no jvd [Normal S1, S2] : normal s1, s2 [Clear to Auscultation Bilaterally] : clear to auscultation bilaterally [Normal to Percussion] : normal to percussion [Benign] : benign [No HSM] : no hsm [No Clubbing] : no clubbing [No Cyanosis] : no cyanosis [No Edema] : no edema [General Appearance - Well Developed] : well developed [General Appearance - Well Nourished] : well nourished [Normal Oropharynx] : normal oropharynx [Jugular Venous Distention Increased] : there was no jugular-venous distention [Heart Sounds] : normal S1 and S2 [Murmurs] : no murmurs present [Auscultation Breath Sounds / Voice Sounds] : lungs were clear to auscultation bilaterally [Lungs Percussion] : the lungs were normal to percussion [Abdomen Soft] : soft [Abdomen Tenderness] : non-tender [] : no hepato-splenomegaly [Nail Clubbing] : no clubbing of the fingernails [Cyanosis, Localized] : no localized cyanosis [Murmur ___ / 6] : murmur [unfilled] / 6 [No Abnormalities] : no abnormalities [Oriented x3] : oriented x3 [TextBox_68] : Mild prolongation of expiration.  No active wheezing.

## 2024-01-29 NOTE — DISCUSSION/SUMMARY
[FreeTextEntry1] : New right upper lobe density resolved.  Appears to have been infectious inflammatory in origin. Bronchiectasis stable without recent exacerbations. Chronic obstructive pulmonary disease stable clinically.  Mild decrease in function today but without active wheezing or significant change in symptom complex.  Will follow. 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  Improved. Cigar smoker discontinued 4-5 years. Significant cigar use prior. No cigs.

## 2024-01-29 NOTE — ASSESSMENT
[FreeTextEntry1] : Continue observation. Continue bronchodilator therapy. Follow-up CT in 1 year task sent as reminder. Follow-up in 6 months or sooner on a as needed basis. Follow-up early in the case of respiratory infections.

## 2024-01-29 NOTE — PROCEDURE
[FreeTextEntry1] : CT of the chest of September 19, 2023 reviewed compared to prior and discussed with patient. Emphysematous changes. Right upper lobe new nodule opacity resolved. Stable pulmonary nodules. No significant adenopathy.  01/29/2024 Pulmonary function testing These data demonstrate a moderate obstructive ventilatory deficit. Normal Lung Volumes. There is a severe diffusion impairment.

## 2024-01-29 NOTE — HISTORY OF PRESENT ILLNESS
[TextBox_4] : on Breo Doing well. Saw PMD recc. COVID vaccine.  Some ROJO stairs.  No recent respiratory infections. no  proair inhaler use BP has been Ok.

## 2024-01-30 RX ORDER — ALIROCUMAB 150 MG/ML
150 INJECTION, SOLUTION SUBCUTANEOUS AS DIRECTED
Qty: 6 | Refills: 3 | Status: ACTIVE | COMMUNITY
Start: 2024-01-30 | End: 1900-01-01

## 2024-04-15 ENCOUNTER — NON-APPOINTMENT (OUTPATIENT)
Age: 84
End: 2024-04-15

## 2024-04-15 DIAGNOSIS — R93.89 ABNORMAL FINDINGS ON DIAGNOSTIC IMAGING OF OTHER SPECIFIED BODY STRUCTURES: ICD-10-CM

## 2024-04-15 DIAGNOSIS — Z01.818 ENCOUNTER FOR OTHER PREPROCEDURAL EXAMINATION: ICD-10-CM

## 2024-04-17 ENCOUNTER — TRANSCRIPTION ENCOUNTER (OUTPATIENT)
Age: 84
End: 2024-04-17

## 2024-04-17 ENCOUNTER — INPATIENT (INPATIENT)
Facility: HOSPITAL | Age: 84
LOS: 10 days | Discharge: HOME | DRG: 303 | End: 2024-04-28
Attending: THORACIC SURGERY (CARDIOTHORACIC VASCULAR SURGERY) | Admitting: THORACIC SURGERY (CARDIOTHORACIC VASCULAR SURGERY)
Payer: MEDICARE

## 2024-04-17 ENCOUNTER — RESULT REVIEW (OUTPATIENT)
Age: 84
End: 2024-04-17

## 2024-04-17 VITALS
HEART RATE: 55 BPM | RESPIRATION RATE: 16 BRPM | SYSTOLIC BLOOD PRESSURE: 138 MMHG | DIASTOLIC BLOOD PRESSURE: 73 MMHG | HEIGHT: 64 IN | WEIGHT: 115.74 LBS | OXYGEN SATURATION: 98 % | TEMPERATURE: 98 F

## 2024-04-17 DIAGNOSIS — Z86.79 PERSONAL HISTORY OF OTHER DISEASES OF THE CIRCULATORY SYSTEM: ICD-10-CM

## 2024-04-17 DIAGNOSIS — I25.10 ATHEROSCLEROTIC HEART DISEASE OF NATIVE CORONARY ARTERY WITHOUT ANGINA PECTORIS: ICD-10-CM

## 2024-04-17 DIAGNOSIS — E78.5 HYPERLIPIDEMIA, UNSPECIFIED: ICD-10-CM

## 2024-04-17 DIAGNOSIS — N17.9 ACUTE KIDNEY FAILURE, UNSPECIFIED: ICD-10-CM

## 2024-04-17 DIAGNOSIS — Z87.09 PERSONAL HISTORY OF OTHER DISEASES OF THE RESPIRATORY SYSTEM: ICD-10-CM

## 2024-04-17 DIAGNOSIS — Z29.9 ENCOUNTER FOR PROPHYLACTIC MEASURES, UNSPECIFIED: ICD-10-CM

## 2024-04-17 LAB
A1C WITH ESTIMATED AVERAGE GLUCOSE RESULT: 5.5 % — SIGNIFICANT CHANGE UP (ref 4–5.6)
ALBUMIN SERPL ELPH-MCNC: 4.2 G/DL — SIGNIFICANT CHANGE UP (ref 3.3–5.2)
ALP SERPL-CCNC: 92 U/L — SIGNIFICANT CHANGE UP (ref 40–120)
ALT FLD-CCNC: 12 U/L — SIGNIFICANT CHANGE UP
ANION GAP SERPL CALC-SCNC: 14 MMOL/L — SIGNIFICANT CHANGE UP (ref 5–17)
APPEARANCE UR: CLEAR — SIGNIFICANT CHANGE UP
APTT BLD: 33.9 SEC — SIGNIFICANT CHANGE UP (ref 24.5–35.6)
AST SERPL-CCNC: 24 U/L — SIGNIFICANT CHANGE UP
BASE EXCESS BLDA CALC-SCNC: 0.4 MMOL/L — SIGNIFICANT CHANGE UP (ref -2–3)
BASOPHILS # BLD AUTO: 0.09 K/UL — SIGNIFICANT CHANGE UP (ref 0–0.2)
BASOPHILS NFR BLD AUTO: 1.1 % — SIGNIFICANT CHANGE UP (ref 0–2)
BILIRUB SERPL-MCNC: 0.5 MG/DL — SIGNIFICANT CHANGE UP (ref 0.4–2)
BILIRUB UR-MCNC: NEGATIVE — SIGNIFICANT CHANGE UP
BLD GP AB SCN SERPL QL: SIGNIFICANT CHANGE UP
BLOOD GAS COMMENTS ARTERIAL: SIGNIFICANT CHANGE UP
BUN SERPL-MCNC: 29.8 MG/DL — HIGH (ref 8–20)
CALCIUM SERPL-MCNC: 9.6 MG/DL — SIGNIFICANT CHANGE UP (ref 8.4–10.5)
CHLORIDE SERPL-SCNC: 99 MMOL/L — SIGNIFICANT CHANGE UP (ref 96–108)
CO2 SERPL-SCNC: 24 MMOL/L — SIGNIFICANT CHANGE UP (ref 22–29)
COLOR SPEC: YELLOW — SIGNIFICANT CHANGE UP
CREAT SERPL-MCNC: 1.59 MG/DL — HIGH (ref 0.5–1.3)
DIFF PNL FLD: NEGATIVE — SIGNIFICANT CHANGE UP
EGFR: 42 ML/MIN/1.73M2 — LOW
EOSINOPHIL # BLD AUTO: 0.6 K/UL — HIGH (ref 0–0.5)
EOSINOPHIL NFR BLD AUTO: 7.4 % — HIGH (ref 0–6)
ESTIMATED AVERAGE GLUCOSE: 111 MG/DL — SIGNIFICANT CHANGE UP (ref 68–114)
GAS PNL BLDA: SIGNIFICANT CHANGE UP
GLUCOSE BLDC GLUCOMTR-MCNC: 101 MG/DL — HIGH (ref 70–99)
GLUCOSE SERPL-MCNC: 90 MG/DL — SIGNIFICANT CHANGE UP (ref 70–99)
GLUCOSE UR QL: NEGATIVE MG/DL — SIGNIFICANT CHANGE UP
HCO3 BLDA-SCNC: 24 MMOL/L — SIGNIFICANT CHANGE UP (ref 21–28)
HCT VFR BLD CALC: 40.7 % — SIGNIFICANT CHANGE UP (ref 39–50)
HGB BLD-MCNC: 13.7 G/DL — SIGNIFICANT CHANGE UP (ref 13–17)
HOROWITZ INDEX BLDA+IHG-RTO: 21 — SIGNIFICANT CHANGE UP
IMM GRANULOCYTES NFR BLD AUTO: 0.5 % — SIGNIFICANT CHANGE UP (ref 0–0.9)
INR BLD: 0.95 RATIO — SIGNIFICANT CHANGE UP (ref 0.85–1.18)
KETONES UR-MCNC: NEGATIVE MG/DL — SIGNIFICANT CHANGE UP
LEUKOCYTE ESTERASE UR-ACNC: NEGATIVE — SIGNIFICANT CHANGE UP
LYMPHOCYTES # BLD AUTO: 1.38 K/UL — SIGNIFICANT CHANGE UP (ref 1–3.3)
LYMPHOCYTES # BLD AUTO: 17.1 % — SIGNIFICANT CHANGE UP (ref 13–44)
MAGNESIUM SERPL-MCNC: 2 MG/DL — SIGNIFICANT CHANGE UP (ref 1.6–2.6)
MCHC RBC-ENTMCNC: 32.5 PG — SIGNIFICANT CHANGE UP (ref 27–34)
MCHC RBC-ENTMCNC: 33.7 GM/DL — SIGNIFICANT CHANGE UP (ref 32–36)
MCV RBC AUTO: 96.7 FL — SIGNIFICANT CHANGE UP (ref 80–100)
MONOCYTES # BLD AUTO: 0.92 K/UL — HIGH (ref 0–0.9)
MONOCYTES NFR BLD AUTO: 11.4 % — SIGNIFICANT CHANGE UP (ref 2–14)
NEUTROPHILS # BLD AUTO: 5.03 K/UL — SIGNIFICANT CHANGE UP (ref 1.8–7.4)
NEUTROPHILS NFR BLD AUTO: 62.5 % — SIGNIFICANT CHANGE UP (ref 43–77)
NITRITE UR-MCNC: NEGATIVE — SIGNIFICANT CHANGE UP
NT-PROBNP SERPL-SCNC: 697 PG/ML — HIGH (ref 0–300)
PCO2 BLDA: 34 MMHG — LOW (ref 35–48)
PH BLDA: 7.46 — HIGH (ref 7.35–7.45)
PH UR: 6 — SIGNIFICANT CHANGE UP (ref 5–8)
PLATELET # BLD AUTO: 280 K/UL — SIGNIFICANT CHANGE UP (ref 150–400)
PO2 BLDA: 113 MMHG — HIGH (ref 83–108)
POTASSIUM SERPL-MCNC: 4.5 MMOL/L — SIGNIFICANT CHANGE UP (ref 3.5–5.3)
POTASSIUM SERPL-SCNC: 4.5 MMOL/L — SIGNIFICANT CHANGE UP (ref 3.5–5.3)
PREALB SERPL-MCNC: 27 MG/DL — SIGNIFICANT CHANGE UP (ref 18–38)
PROT SERPL-MCNC: 7.6 G/DL — SIGNIFICANT CHANGE UP (ref 6.6–8.7)
PROT UR-MCNC: NEGATIVE MG/DL — SIGNIFICANT CHANGE UP
PROTHROM AB SERPL-ACNC: 10.6 SEC — SIGNIFICANT CHANGE UP (ref 9.5–13)
RBC # BLD: 4.21 M/UL — SIGNIFICANT CHANGE UP (ref 4.2–5.8)
RBC # FLD: 14.2 % — SIGNIFICANT CHANGE UP (ref 10.3–14.5)
SAO2 % BLDA: 99.1 % — HIGH (ref 94–98)
SODIUM SERPL-SCNC: 137 MMOL/L — SIGNIFICANT CHANGE UP (ref 135–145)
SP GR SPEC: 1.02 — SIGNIFICANT CHANGE UP (ref 1–1.03)
TSH SERPL-MCNC: 2.74 UIU/ML — SIGNIFICANT CHANGE UP (ref 0.27–4.2)
UROBILINOGEN FLD QL: 0.2 MG/DL — SIGNIFICANT CHANGE UP (ref 0.2–1)
WBC # BLD: 8.06 K/UL — SIGNIFICANT CHANGE UP (ref 3.8–10.5)
WBC # FLD AUTO: 8.06 K/UL — SIGNIFICANT CHANGE UP (ref 3.8–10.5)

## 2024-04-17 PROCEDURE — 93306 TTE W/DOPPLER COMPLETE: CPT | Mod: 26

## 2024-04-17 PROCEDURE — 93010 ELECTROCARDIOGRAM REPORT: CPT

## 2024-04-17 PROCEDURE — 71250 CT THORAX DX C-: CPT | Mod: 26

## 2024-04-17 PROCEDURE — 99223 1ST HOSP IP/OBS HIGH 75: CPT

## 2024-04-17 PROCEDURE — 93880 EXTRACRANIAL BILAT STUDY: CPT | Mod: 26

## 2024-04-17 RX ORDER — DEXTROSE 50 % IN WATER 50 %
12.5 SYRINGE (ML) INTRAVENOUS ONCE
Refills: 0 | Status: DISCONTINUED | OUTPATIENT
Start: 2024-04-17 | End: 2024-04-18

## 2024-04-17 RX ORDER — DEXTROSE 50 % IN WATER 50 %
25 SYRINGE (ML) INTRAVENOUS ONCE
Refills: 0 | Status: DISCONTINUED | OUTPATIENT
Start: 2024-04-17 | End: 2024-04-18

## 2024-04-17 RX ORDER — INSULIN LISPRO 100/ML
VIAL (ML) SUBCUTANEOUS
Refills: 0 | Status: DISCONTINUED | OUTPATIENT
Start: 2024-04-17 | End: 2024-04-18

## 2024-04-17 RX ORDER — GLUCAGON INJECTION, SOLUTION 0.5 MG/.1ML
1 INJECTION, SOLUTION SUBCUTANEOUS ONCE
Refills: 0 | Status: DISCONTINUED | OUTPATIENT
Start: 2024-04-17 | End: 2024-04-18

## 2024-04-17 RX ORDER — ALIROCUMAB 75 MG/ML
150 INJECTION, SOLUTION SUBCUTANEOUS
Refills: 0 | DISCHARGE

## 2024-04-17 RX ORDER — HEPARIN SODIUM 5000 [USP'U]/ML
5000 INJECTION INTRAVENOUS; SUBCUTANEOUS EVERY 12 HOURS
Refills: 0 | Status: DISCONTINUED | OUTPATIENT
Start: 2024-04-17 | End: 2024-04-18

## 2024-04-17 RX ORDER — AMLODIPINE BESYLATE 2.5 MG/1
10 TABLET ORAL DAILY
Refills: 0 | Status: DISCONTINUED | OUTPATIENT
Start: 2024-04-17 | End: 2024-04-18

## 2024-04-17 RX ORDER — BUDESONIDE AND FORMOTEROL FUMARATE DIHYDRATE 160; 4.5 UG/1; UG/1
2 AEROSOL RESPIRATORY (INHALATION)
Refills: 0 | Status: DISCONTINUED | OUTPATIENT
Start: 2024-04-17 | End: 2024-04-18

## 2024-04-17 RX ORDER — DENOSUMAB 60 MG/ML
60 INJECTION SUBCUTANEOUS
Refills: 0 | DISCHARGE

## 2024-04-17 RX ORDER — SODIUM CHLORIDE 9 MG/ML
3 INJECTION INTRAMUSCULAR; INTRAVENOUS; SUBCUTANEOUS EVERY 8 HOURS
Refills: 0 | Status: DISCONTINUED | OUTPATIENT
Start: 2024-04-17 | End: 2024-04-18

## 2024-04-17 RX ORDER — MUPIROCIN 20 MG/G
1 OINTMENT TOPICAL EVERY 12 HOURS
Refills: 0 | Status: DISCONTINUED | OUTPATIENT
Start: 2024-04-17 | End: 2024-04-18

## 2024-04-17 RX ORDER — FLUTICASONE FUROATE AND VILANTEROL TRIFENATATE 100; 25 UG/1; UG/1
1 POWDER RESPIRATORY (INHALATION)
Refills: 0 | DISCHARGE

## 2024-04-17 RX ORDER — FINASTERIDE 5 MG/1
5 TABLET, FILM COATED ORAL DAILY
Refills: 0 | Status: DISCONTINUED | OUTPATIENT
Start: 2024-04-17 | End: 2024-04-18

## 2024-04-17 RX ORDER — FINASTERIDE 5 MG/1
1 TABLET, FILM COATED ORAL
Refills: 0 | DISCHARGE

## 2024-04-17 RX ORDER — DEXTROSE 50 % IN WATER 50 %
15 SYRINGE (ML) INTRAVENOUS ONCE
Refills: 0 | Status: DISCONTINUED | OUTPATIENT
Start: 2024-04-17 | End: 2024-04-18

## 2024-04-17 RX ORDER — SODIUM CHLORIDE 9 MG/ML
1000 INJECTION INTRAMUSCULAR; INTRAVENOUS; SUBCUTANEOUS
Refills: 0 | Status: DISCONTINUED | OUTPATIENT
Start: 2024-04-17 | End: 2024-04-18

## 2024-04-17 RX ORDER — CARVEDILOL PHOSPHATE 80 MG/1
6.25 CAPSULE, EXTENDED RELEASE ORAL
Refills: 0 | Status: DISCONTINUED | OUTPATIENT
Start: 2024-04-18 | End: 2024-04-18

## 2024-04-17 RX ORDER — PANTOPRAZOLE SODIUM 20 MG/1
40 TABLET, DELAYED RELEASE ORAL
Refills: 0 | Status: DISCONTINUED | OUTPATIENT
Start: 2024-04-17 | End: 2024-04-18

## 2024-04-17 RX ADMIN — SODIUM CHLORIDE 3 MILLILITER(S): 9 INJECTION INTRAMUSCULAR; INTRAVENOUS; SUBCUTANEOUS at 21:39

## 2024-04-17 RX ADMIN — BUDESONIDE AND FORMOTEROL FUMARATE DIHYDRATE 2 PUFF(S): 160; 4.5 AEROSOL RESPIRATORY (INHALATION) at 21:36

## 2024-04-17 RX ADMIN — AMLODIPINE BESYLATE 10 MILLIGRAM(S): 2.5 TABLET ORAL at 22:02

## 2024-04-17 RX ADMIN — FINASTERIDE 5 MILLIGRAM(S): 5 TABLET, FILM COATED ORAL at 22:02

## 2024-04-17 NOTE — PATIENT PROFILE ADULT - FALL HARM RISK - HARM RISK INTERVENTIONS

## 2024-04-17 NOTE — H&P ADULT - PROBLEM SELECTOR PLAN 2
CKD3 baseline sCr 1.4 (am labs at [facility] 1.8)  dye load with [facility] today  hold home Eplerenone  add gentle IVF hydration, follow up chemistry  Adjust meds pr CrCl  Monitoring urine output, I&OS  Follow chemistry  Avoid nephrotoxic agents  Renal consult in AM

## 2024-04-17 NOTE — H&P ADULT - NSICDXPASTMEDICALHX_GEN_ALL_CORE_FT
PAST MEDICAL HISTORY:  Asthma     CAD (coronary artery disease)     Empyema     Former smoker     H/O secondary hypertension     History of COPD     HLD (hyperlipidemia)     PNA (pneumonia)     Stage 3 chronic kidney disease     Statin intolerance

## 2024-04-17 NOTE — H&P ADULT - PROBLEM SELECTOR PLAN 4
not in exacerbation  history of heavy cigar use, quit in 2018  on breo at home, add symbicort  f/u baseline RA abd, chest ct, PFTs  Pulm consult in AM

## 2024-04-17 NOTE — H&P ADULT - PROBLEM SELECTOR PLAN 1
MVD on elective C 4/17, transferred for CABG eval  admit to tele/  no active chest pain, check baseline EKG  preserved EF per records  Statin intolerance - defer  Continue home Coreg (EOD dosing)  Discuss adding aspirin with team in AM  Preop work up ordered including carotid US to assess for carotid stenosis, PFTs to eval lung function, TTE to eval EF / WMA / Valve function, and lab work including TSH, prealbumin, Hgb A1C, P2Y12, BNP, T&S, MRSA/MSSA, and UA.

## 2024-04-17 NOTE — H&P ADULT - PROBLEM SELECTOR PLAN 3
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

## 2024-04-17 NOTE — H&P ADULT - ASSESSMENT
84 male PMHx CAD, HLD, HTN, asthma, COPD, former smoker (4.5 cigars/daily, ~ 50years, quit 2018), Statin intolerance, CKD3 (baseline sCr 1.4), PNA/Empyema, pericarditis and possible adrenal tumor/ pheochromocytoma. In 2019 STEMI and underwent cath which showed moderate TVD and normal EF - no intervention was diagnosed with pericarditis. Has close follow up with his cardiologist and nephrologist for new onset secondary HTN (reportedly SBP 200s). Imaging in 1/2022 noted 8mm left adrenal nodule (repeat 1/2023 showing stable appearance), pheochromocytoma workup indeterminate. MR abdomen cannot rule out neuroendocrine tumor. Was started on beta blocker and diuretics with improvement in blood pressure. Patient with progressive ROJO and intermittent lower extremity edema. Had CTA of coronaries to follow up on progression of his CAD which revealed total calcium score of 3065. Now s/p elective LHC today showing LM and severe TVD. Transfered to [facility] for CABG eval.

## 2024-04-17 NOTE — H&P ADULT - HISTORY OF PRESENT ILLNESS
84 male PMHx CAD, HLD, HTN, asthma, COPD, former smoker (4.5 cigars/daily, ~ 50years, quit 2018), Statin intolerance, CKD3 (baseline sCr 1.4), PNA/Empyema, pericarditis and possible adrenal tumor/ pheochromocytoma. In 2019 STEMI and underwent cath which showed moderate TVD and normal EF - no intervention was diagnosed with pericarditis. Has close follow up with his cardiologist and nephrologist for new onset secondary HTN (reportedly SBP 200s). Imaging in 1/2022 noted 8mm left adrenal nodule (repeat 1/2023 showing stable appearance), pheochromocytoma workup indeterminate. MR abdomen cannot rule out neuroendocrine tumor. Was started on beta blocker and diuretics with improvement in blood pressure. Patient with progressive ROJO and intermittent lower extremity edema. Had CTA of coronaries to follow up on progression of his CAD which revealed total calcium score of 3065. Now s/p elective LHC today showing LM and severe TVD. Transfered to [facility] for CABG eval.  Patient denies any chest pain, palpitations, HA, abd pain, n/v, dizziness, diaphoresis, syncope, changes in bowel bladder function, weight gain/loss. States "normal health."

## 2024-04-17 NOTE — H&P ADULT - NSHPPHYSICALEXAM_GEN_ALL_CORE
Constitutional: NAD, thin  male  Neck: supple, trachea midline; no JVD   Respiratory: Breath sounds clear to auscultation, no accessory muscle use noted. No wheezing, rales, or rhonchi noted b/l   Cardiovascular: Regular rate, regular rhythm, normal S1, S2; no murmurs or rub   Gastrointestinal: Soft, non-tender, non-distended, + bowel sounds   Extremities: HENDRIX x 4, no peripheral edema, no cyanosis, no clubbing, LE hemosiderin  Vascular: Equal and normal pulses: 2+ peripheral pulses throughout   Neurological: A+O x 3; speech clear and intact; no gross sensory/motor deficits   Psychiatric: calm, normal mood, normal affect   Skin: warm, dry, well perfused, no rashes

## 2024-04-17 NOTE — PATIENT PROFILE ADULT - FUNCTIONAL ASSESSMENT - DAILY ACTIVITY 6.
Outreach attempt was made to schedule a Medicare Wellness Visit. This was the first attempt. Contact was made, [facility] appointment refused.     Pt refused appt. And stated that she had to pay for her wellness visit last time she schedule on so she will pass this appts.    
4 = No assist / stand by assistance

## 2024-04-17 NOTE — H&P ADULT - NSHPSOCIALHISTORY_GEN_ALL_CORE
Former cigar use (4-5/day approx 50years, quit in 2018)  No etoh or illicit substance use  Independent of ADLs  No functional decline  No use of assistive devices

## 2024-04-18 ENCOUNTER — RESULT REVIEW (OUTPATIENT)
Age: 84
End: 2024-04-18

## 2024-04-18 ENCOUNTER — APPOINTMENT (OUTPATIENT)
Dept: CARDIOTHORACIC SURGERY | Facility: HOSPITAL | Age: 84
End: 2024-04-18

## 2024-04-18 DIAGNOSIS — J44.9 CHRONIC OBSTRUCTIVE PULMONARY DISEASE, UNSPECIFIED: ICD-10-CM

## 2024-04-18 DIAGNOSIS — R91.8 OTHER NONSPECIFIC ABNORMAL FINDING OF LUNG FIELD: ICD-10-CM

## 2024-04-18 DIAGNOSIS — Z01.811 ENCOUNTER FOR PREPROCEDURAL RESPIRATORY EXAMINATION: ICD-10-CM

## 2024-04-18 DIAGNOSIS — J43.9 EMPHYSEMA, UNSPECIFIED: ICD-10-CM

## 2024-04-18 LAB
A1C WITH ESTIMATED AVERAGE GLUCOSE RESULT: 5.8 % — HIGH (ref 4–5.6)
ABO RH CONFIRMATION: SIGNIFICANT CHANGE UP
ALBUMIN SERPL ELPH-MCNC: 2.6 G/DL — LOW (ref 3.3–5.2)
ALP SERPL-CCNC: 45 U/L — SIGNIFICANT CHANGE UP (ref 40–120)
ALT FLD-CCNC: 9 U/L — SIGNIFICANT CHANGE UP
ANION GAP SERPL CALC-SCNC: 11 MMOL/L — SIGNIFICANT CHANGE UP (ref 5–17)
ANION GAP SERPL CALC-SCNC: 16 MMOL/L — SIGNIFICANT CHANGE UP (ref 5–17)
APTT BLD: 35.6 SEC — SIGNIFICANT CHANGE UP (ref 24.5–35.6)
APTT BLD: 36.4 SEC — HIGH (ref 24.5–35.6)
AST SERPL-CCNC: 44 U/L — HIGH
BASOPHILS # BLD AUTO: 0.05 K/UL — SIGNIFICANT CHANGE UP (ref 0–0.2)
BASOPHILS NFR BLD AUTO: 0.3 % — SIGNIFICANT CHANGE UP (ref 0–2)
BILIRUB SERPL-MCNC: 0.8 MG/DL — SIGNIFICANT CHANGE UP (ref 0.4–2)
BUN SERPL-MCNC: 19.3 MG/DL — SIGNIFICANT CHANGE UP (ref 8–20)
BUN SERPL-MCNC: 28.9 MG/DL — HIGH (ref 8–20)
CALCIUM SERPL-MCNC: 8.1 MG/DL — LOW (ref 8.4–10.5)
CALCIUM SERPL-MCNC: 8.4 MG/DL — SIGNIFICANT CHANGE UP (ref 8.4–10.5)
CHLORIDE SERPL-SCNC: 107 MMOL/L — SIGNIFICANT CHANGE UP (ref 96–108)
CHLORIDE SERPL-SCNC: 107 MMOL/L — SIGNIFICANT CHANGE UP (ref 96–108)
CK MB CFR SERPL CALC: 51.8 NG/ML — HIGH (ref 0–6.7)
CK SERPL-CCNC: 392 U/L — HIGH (ref 30–200)
CO2 SERPL-SCNC: 23 MMOL/L — SIGNIFICANT CHANGE UP (ref 22–29)
CO2 SERPL-SCNC: 23 MMOL/L — SIGNIFICANT CHANGE UP (ref 22–29)
CORTIS AM PEAK SERPL-MCNC: 15.5 UG/DL — SIGNIFICANT CHANGE UP (ref 6–18.4)
CREAT SERPL-MCNC: 1 MG/DL — SIGNIFICANT CHANGE UP (ref 0.5–1.3)
CREAT SERPL-MCNC: 1.6 MG/DL — HIGH (ref 0.5–1.3)
EGFR: 42 ML/MIN/1.73M2 — LOW
EGFR: 74 ML/MIN/1.73M2 — SIGNIFICANT CHANGE UP
EOSINOPHIL # BLD AUTO: 0.14 K/UL — SIGNIFICANT CHANGE UP (ref 0–0.5)
EOSINOPHIL NFR BLD AUTO: 0.9 % — SIGNIFICANT CHANGE UP (ref 0–6)
ERYTHROCYTE [SEDIMENTATION RATE] IN BLOOD: 2 MM/HR — SIGNIFICANT CHANGE UP (ref 0–15)
ESTIMATED AVERAGE GLUCOSE: 120 MG/DL — HIGH (ref 68–114)
FIBRINOGEN PPP-MCNC: 243 MG/DL — SIGNIFICANT CHANGE UP (ref 200–450)
GAS PNL BLDA: SIGNIFICANT CHANGE UP
GAS PNL BLDA: SIGNIFICANT CHANGE UP
GLUCOSE BLDC GLUCOMTR-MCNC: 188 MG/DL — HIGH (ref 70–99)
GLUCOSE BLDC GLUCOMTR-MCNC: 204 MG/DL — HIGH (ref 70–99)
GLUCOSE BLDC GLUCOMTR-MCNC: 221 MG/DL — HIGH (ref 70–99)
GLUCOSE BLDC GLUCOMTR-MCNC: 255 MG/DL — HIGH (ref 70–99)
GLUCOSE BLDC GLUCOMTR-MCNC: 84 MG/DL — SIGNIFICANT CHANGE UP (ref 70–99)
GLUCOSE BLDC GLUCOMTR-MCNC: 96 MG/DL — SIGNIFICANT CHANGE UP (ref 70–99)
GLUCOSE SERPL-MCNC: 191 MG/DL — HIGH (ref 70–99)
GLUCOSE SERPL-MCNC: 87 MG/DL — SIGNIFICANT CHANGE UP (ref 70–99)
HCT VFR BLD CALC: 24 % — LOW (ref 39–50)
HCT VFR BLD CALC: 26.2 % — LOW (ref 39–50)
HCT VFR BLD CALC: 42.2 % — SIGNIFICANT CHANGE UP (ref 39–50)
HGB BLD-MCNC: 14.2 G/DL — SIGNIFICANT CHANGE UP (ref 13–17)
HGB BLD-MCNC: 8.1 G/DL — LOW (ref 13–17)
HGB BLD-MCNC: 9.2 G/DL — LOW (ref 13–17)
IMM GRANULOCYTES NFR BLD AUTO: 1.9 % — HIGH (ref 0–0.9)
INR BLD: 1.44 RATIO — HIGH (ref 0.85–1.18)
INR BLD: 1.57 RATIO — HIGH (ref 0.85–1.18)
LYMPHOCYTES # BLD AUTO: 1.99 K/UL — SIGNIFICANT CHANGE UP (ref 1–3.3)
LYMPHOCYTES # BLD AUTO: 13.3 % — SIGNIFICANT CHANGE UP (ref 13–44)
MAGNESIUM SERPL-MCNC: 1.8 MG/DL — SIGNIFICANT CHANGE UP (ref 1.6–2.6)
MAGNESIUM SERPL-MCNC: 2.6 MG/DL — SIGNIFICANT CHANGE UP (ref 1.6–2.6)
MCHC RBC-ENTMCNC: 32 PG — SIGNIFICANT CHANGE UP (ref 27–34)
MCHC RBC-ENTMCNC: 32.7 PG — SIGNIFICANT CHANGE UP (ref 27–34)
MCHC RBC-ENTMCNC: 33.2 PG — SIGNIFICANT CHANGE UP (ref 27–34)
MCHC RBC-ENTMCNC: 33.6 GM/DL — SIGNIFICANT CHANGE UP (ref 32–36)
MCHC RBC-ENTMCNC: 33.8 GM/DL — SIGNIFICANT CHANGE UP (ref 32–36)
MCHC RBC-ENTMCNC: 35.1 GM/DL — SIGNIFICANT CHANGE UP (ref 32–36)
MCV RBC AUTO: 94.6 FL — SIGNIFICANT CHANGE UP (ref 80–100)
MCV RBC AUTO: 94.9 FL — SIGNIFICANT CHANGE UP (ref 80–100)
MCV RBC AUTO: 97.2 FL — SIGNIFICANT CHANGE UP (ref 80–100)
MONOCYTES # BLD AUTO: 0.94 K/UL — HIGH (ref 0–0.9)
MONOCYTES NFR BLD AUTO: 6.3 % — SIGNIFICANT CHANGE UP (ref 2–14)
MRSA PCR RESULT.: SIGNIFICANT CHANGE UP
NEUTROPHILS # BLD AUTO: 11.56 K/UL — HIGH (ref 1.8–7.4)
NEUTROPHILS NFR BLD AUTO: 77.3 % — HIGH (ref 43–77)
PHOSPHATE SERPL-MCNC: 3.6 MG/DL — SIGNIFICANT CHANGE UP (ref 2.4–4.7)
PLATELET # BLD AUTO: 192 K/UL — SIGNIFICANT CHANGE UP (ref 150–400)
PLATELET # BLD AUTO: 233 K/UL — SIGNIFICANT CHANGE UP (ref 150–400)
PLATELET # BLD AUTO: 276 K/UL — SIGNIFICANT CHANGE UP (ref 150–400)
POTASSIUM SERPL-MCNC: 4.2 MMOL/L — SIGNIFICANT CHANGE UP (ref 3.5–5.3)
POTASSIUM SERPL-MCNC: 4.7 MMOL/L — SIGNIFICANT CHANGE UP (ref 3.5–5.3)
POTASSIUM SERPL-SCNC: 4.2 MMOL/L — SIGNIFICANT CHANGE UP (ref 3.5–5.3)
POTASSIUM SERPL-SCNC: 4.7 MMOL/L — SIGNIFICANT CHANGE UP (ref 3.5–5.3)
PROT SERPL-MCNC: 4 G/DL — LOW (ref 6.6–8.7)
PROTHROM AB SERPL-ACNC: 15.8 SEC — HIGH (ref 9.5–13)
PROTHROM AB SERPL-ACNC: 17.2 SEC — HIGH (ref 9.5–13)
RBC # BLD: 2.53 M/UL — LOW (ref 4.2–5.8)
RBC # BLD: 2.77 M/UL — LOW (ref 4.2–5.8)
RBC # BLD: 4.34 M/UL — SIGNIFICANT CHANGE UP (ref 4.2–5.8)
RBC # FLD: 13.9 % — SIGNIFICANT CHANGE UP (ref 10.3–14.5)
RBC # FLD: 15.1 % — HIGH (ref 10.3–14.5)
RBC # FLD: 15.2 % — HIGH (ref 10.3–14.5)
S AUREUS DNA NOSE QL NAA+PROBE: SIGNIFICANT CHANGE UP
SODIUM SERPL-SCNC: 141 MMOL/L — SIGNIFICANT CHANGE UP (ref 135–145)
SODIUM SERPL-SCNC: 146 MMOL/L — HIGH (ref 135–145)
TROPONIN T, HIGH SENSITIVITY RESULT: 2169 NG/L — HIGH (ref 0–51)
WBC # BLD: 14 K/UL — HIGH (ref 3.8–10.5)
WBC # BLD: 14.96 K/UL — HIGH (ref 3.8–10.5)
WBC # BLD: 8.37 K/UL — SIGNIFICANT CHANGE UP (ref 3.8–10.5)
WBC # FLD AUTO: 14 K/UL — HIGH (ref 3.8–10.5)
WBC # FLD AUTO: 14.96 K/UL — HIGH (ref 3.8–10.5)
WBC # FLD AUTO: 8.37 K/UL — SIGNIFICANT CHANGE UP (ref 3.8–10.5)

## 2024-04-18 PROCEDURE — 76998 US GUIDE INTRAOP: CPT | Mod: 26,59

## 2024-04-18 PROCEDURE — 33508 ENDOSCOPIC VEIN HARVEST: CPT | Mod: AS,59

## 2024-04-18 PROCEDURE — 99222 1ST HOSP IP/OBS MODERATE 55: CPT

## 2024-04-18 PROCEDURE — 71045 X-RAY EXAM CHEST 1 VIEW: CPT | Mod: 26

## 2024-04-18 PROCEDURE — 93010 ELECTROCARDIOGRAM REPORT: CPT

## 2024-04-18 PROCEDURE — 99291 CRITICAL CARE FIRST HOUR: CPT

## 2024-04-18 PROCEDURE — 33508 ENDOSCOPIC VEIN HARVEST: CPT | Mod: 59

## 2024-04-18 PROCEDURE — 88305 TISSUE EXAM BY PATHOLOGIST: CPT | Mod: 26

## 2024-04-18 PROCEDURE — 33405 REPLACEMENT AORTIC VALVE OPN: CPT

## 2024-04-18 PROCEDURE — 99232 SBSQ HOSP IP/OBS MODERATE 35: CPT | Mod: FS

## 2024-04-18 PROCEDURE — 33533 CABG ARTERIAL SINGLE: CPT | Mod: AS

## 2024-04-18 PROCEDURE — 33405 REPLACEMENT AORTIC VALVE OPN: CPT | Mod: AS

## 2024-04-18 PROCEDURE — 33518 CABG ARTERY-VEIN TWO: CPT | Mod: AS

## 2024-04-18 PROCEDURE — 33518 CABG ARTERY-VEIN TWO: CPT

## 2024-04-18 PROCEDURE — 33533 CABG ARTERIAL SINGLE: CPT

## 2024-04-18 PROCEDURE — 76998 US GUIDE INTRAOP: CPT | Mod: 26,NC,AS,59

## 2024-04-18 DEVICE — COR-KNOT MINI DEVICE COMBO KIT: Type: IMPLANTABLE DEVICE | Status: FUNCTIONAL

## 2024-04-18 DEVICE — HEARTSTRING III PROXIMAL SEAL SYSTEM: Type: IMPLANTABLE DEVICE | Status: FUNCTIONAL

## 2024-04-18 DEVICE — CANNULA VESSEL 3MM BLUNT TIP CLEAR 1-WAY VALVE: Type: IMPLANTABLE DEVICE | Status: FUNCTIONAL

## 2024-04-18 DEVICE — PACING WIRE ORANGE M-25 WINGED WIRE 37MM X 89MM: Type: IMPLANTABLE DEVICE | Status: FUNCTIONAL

## 2024-04-18 DEVICE — IMPLANTABLE DEVICE: Type: IMPLANTABLE DEVICE | Status: FUNCTIONAL

## 2024-04-18 DEVICE — CHEST DRAIN THORACIC ARGYLE PVC 28FR RIGHT ANGLE: Type: IMPLANTABLE DEVICE | Status: FUNCTIONAL

## 2024-04-18 DEVICE — COR-KNOT QUICK LOAD SINGLES: Type: IMPLANTABLE DEVICE | Status: FUNCTIONAL

## 2024-04-18 DEVICE — CANNULA VENOUS 2 STAGE LIGHTHOUSE TIP 28-38FR X 1/2" NON-VENTED: Type: IMPLANTABLE DEVICE | Status: FUNCTIONAL

## 2024-04-18 DEVICE — PACING WIRE WHITE M-25 WINGED WIRE 37MM X 89MM: Type: IMPLANTABLE DEVICE | Status: FUNCTIONAL

## 2024-04-18 DEVICE — FLOSEAL WITH RECOTHROM THROMBIN 10ML: Type: IMPLANTABLE DEVICE | Status: FUNCTIONAL

## 2024-04-18 DEVICE — KIT CVC 2LUM MAC 9FR CHG: Type: IMPLANTABLE DEVICE | Status: FUNCTIONAL

## 2024-04-18 DEVICE — CANNULA AORTIC ROOT 14G X 14CM FLANGED: Type: IMPLANTABLE DEVICE | Status: FUNCTIONAL

## 2024-04-18 DEVICE — CANNULA ARTERIAL SOFT-FLOW 21FR EXTENDED VENTED: Type: IMPLANTABLE DEVICE | Status: FUNCTIONAL

## 2024-04-18 DEVICE — KIT A-LINE 1LUM 20G X 12CM SAFE KIT: Type: IMPLANTABLE DEVICE | Status: FUNCTIONAL

## 2024-04-18 DEVICE — PROGEL PLEURAL AIR LEAK 4ML: Type: IMPLANTABLE DEVICE | Status: FUNCTIONAL

## 2024-04-18 DEVICE — VALVE AORTIC INSPIRIS RESILIA 23MM: Type: IMPLANTABLE DEVICE | Status: FUNCTIONAL

## 2024-04-18 DEVICE — OCCLUDER INTERNAL VESSEL FLO-RESTER 1 X 12MM: Type: IMPLANTABLE DEVICE | Status: FUNCTIONAL

## 2024-04-18 DEVICE — MEDIASTINAL CATH DRAIN 9MM: Type: IMPLANTABLE DEVICE | Status: FUNCTIONAL

## 2024-04-18 DEVICE — CANNULA RETROGRADE CARDIOPLEGIA SELF-INFLATING 14FR PRE-SHAPED STYLET/HANDLE: Type: IMPLANTABLE DEVICE | Status: FUNCTIONAL

## 2024-04-18 DEVICE — CANNULA ATRASUMP 1/4" X 38CM: Type: IMPLANTABLE DEVICE | Status: FUNCTIONAL

## 2024-04-18 DEVICE — SHEATH INTRODUCER TERUMO PINNACLE PERIPHERAL 4FR X 10CM X 0.035" MINI WIRE: Type: IMPLANTABLE DEVICE | Status: FUNCTIONAL

## 2024-04-18 RX ORDER — INSULIN HUMAN 100 [IU]/ML
2 INJECTION, SOLUTION SUBCUTANEOUS
Qty: 100 | Refills: 0 | Status: DISCONTINUED | OUTPATIENT
Start: 2024-04-18 | End: 2024-04-19

## 2024-04-18 RX ORDER — CHLORHEXIDINE GLUCONATE 213 G/1000ML
15 SOLUTION TOPICAL EVERY 12 HOURS
Refills: 0 | Status: DISCONTINUED | OUTPATIENT
Start: 2024-04-18 | End: 2024-04-19

## 2024-04-18 RX ORDER — ACETAMINOPHEN 500 MG
650 TABLET ORAL EVERY 6 HOURS
Refills: 0 | Status: DISCONTINUED | OUTPATIENT
Start: 2024-04-22 | End: 2024-04-28

## 2024-04-18 RX ORDER — VASOPRESSIN 20 [USP'U]/ML
0.04 INJECTION INTRAVENOUS
Qty: 40 | Refills: 0 | Status: DISCONTINUED | OUTPATIENT
Start: 2024-04-18 | End: 2024-04-19

## 2024-04-18 RX ORDER — GABAPENTIN 400 MG/1
100 CAPSULE ORAL EVERY 8 HOURS
Refills: 0 | Status: COMPLETED | OUTPATIENT
Start: 2024-04-19 | End: 2024-04-23

## 2024-04-18 RX ORDER — PANTOPRAZOLE SODIUM 20 MG/1
40 TABLET, DELAYED RELEASE ORAL DAILY
Refills: 0 | Status: DISCONTINUED | OUTPATIENT
Start: 2024-04-19 | End: 2024-04-28

## 2024-04-18 RX ORDER — POTASSIUM CHLORIDE 20 MEQ
10 PACKET (EA) ORAL
Refills: 0 | Status: DISCONTINUED | OUTPATIENT
Start: 2024-04-18 | End: 2024-04-19

## 2024-04-18 RX ORDER — ALBUMIN HUMAN 25 %
250 VIAL (ML) INTRAVENOUS ONCE
Refills: 0 | Status: COMPLETED | OUTPATIENT
Start: 2024-04-18 | End: 2024-04-18

## 2024-04-18 RX ORDER — PANTOPRAZOLE SODIUM 20 MG/1
40 TABLET, DELAYED RELEASE ORAL ONCE
Refills: 0 | Status: COMPLETED | OUTPATIENT
Start: 2024-04-18 | End: 2024-04-18

## 2024-04-18 RX ORDER — VANCOMYCIN HCL 1 G
750 VIAL (EA) INTRAVENOUS ONCE
Refills: 0 | Status: DISCONTINUED | OUTPATIENT
Start: 2024-04-18 | End: 2024-04-19

## 2024-04-18 RX ORDER — SODIUM CHLORIDE 9 MG/ML
1000 INJECTION INTRAMUSCULAR; INTRAVENOUS; SUBCUTANEOUS
Refills: 0 | Status: DISCONTINUED | OUTPATIENT
Start: 2024-04-18 | End: 2024-04-23

## 2024-04-18 RX ORDER — ACETAMINOPHEN 500 MG
975 TABLET ORAL EVERY 6 HOURS
Refills: 0 | Status: COMPLETED | OUTPATIENT
Start: 2024-04-19 | End: 2024-04-21

## 2024-04-18 RX ORDER — DEXTROSE 50 % IN WATER 50 %
50 SYRINGE (ML) INTRAVENOUS
Refills: 0 | Status: DISCONTINUED | OUTPATIENT
Start: 2024-04-18 | End: 2024-04-19

## 2024-04-18 RX ORDER — ASPIRIN/CALCIUM CARB/MAGNESIUM 324 MG
325 TABLET ORAL DAILY
Refills: 0 | Status: DISCONTINUED | OUTPATIENT
Start: 2024-04-19 | End: 2024-04-25

## 2024-04-18 RX ORDER — SENNA PLUS 8.6 MG/1
2 TABLET ORAL AT BEDTIME
Refills: 0 | Status: DISCONTINUED | OUTPATIENT
Start: 2024-04-19 | End: 2024-04-28

## 2024-04-18 RX ORDER — DEXAMETHASONE 0.5 MG/5ML
4 ELIXIR ORAL EVERY 6 HOURS
Refills: 0 | Status: COMPLETED | OUTPATIENT
Start: 2024-04-18 | End: 2024-04-19

## 2024-04-18 RX ORDER — POLYETHYLENE GLYCOL 3350 17 G/17G
17 POWDER, FOR SOLUTION ORAL DAILY
Refills: 0 | Status: DISCONTINUED | OUTPATIENT
Start: 2024-04-19 | End: 2024-04-28

## 2024-04-18 RX ORDER — EPINEPHRINE 0.3 MG/.3ML
0.04 INJECTION INTRAMUSCULAR; SUBCUTANEOUS
Qty: 4 | Refills: 0 | Status: DISCONTINUED | OUTPATIENT
Start: 2024-04-18 | End: 2024-04-19

## 2024-04-18 RX ORDER — OXYCODONE HYDROCHLORIDE 5 MG/1
5 TABLET ORAL EVERY 4 HOURS
Refills: 0 | Status: DISCONTINUED | OUTPATIENT
Start: 2024-04-19 | End: 2024-04-19

## 2024-04-18 RX ORDER — NOREPINEPHRINE BITARTRATE/D5W 8 MG/250ML
0.05 PLASTIC BAG, INJECTION (ML) INTRAVENOUS
Qty: 8 | Refills: 0 | Status: DISCONTINUED | OUTPATIENT
Start: 2024-04-18 | End: 2024-04-19

## 2024-04-18 RX ORDER — CHLORHEXIDINE GLUCONATE 213 G/1000ML
1 SOLUTION TOPICAL
Refills: 0 | Status: DISCONTINUED | OUTPATIENT
Start: 2024-04-18 | End: 2024-04-24

## 2024-04-18 RX ORDER — CALCIUM GLUCONATE 100 MG/ML
1 VIAL (ML) INTRAVENOUS ONCE
Refills: 0 | Status: COMPLETED | OUTPATIENT
Start: 2024-04-18 | End: 2024-04-18

## 2024-04-18 RX ORDER — OXYCODONE HYDROCHLORIDE 5 MG/1
10 TABLET ORAL EVERY 4 HOURS
Refills: 0 | Status: DISCONTINUED | OUTPATIENT
Start: 2024-04-19 | End: 2024-04-19

## 2024-04-18 RX ORDER — IPRATROPIUM/ALBUTEROL SULFATE 18-103MCG
3 AEROSOL WITH ADAPTER (GRAM) INHALATION EVERY 6 HOURS
Refills: 0 | Status: DISCONTINUED | OUTPATIENT
Start: 2024-04-18 | End: 2024-04-28

## 2024-04-18 RX ORDER — AMIODARONE HYDROCHLORIDE 400 MG/1
400 TABLET ORAL
Refills: 0 | Status: DISCONTINUED | OUTPATIENT
Start: 2024-04-19 | End: 2024-04-19

## 2024-04-18 RX ORDER — ASCORBIC ACID 60 MG
500 TABLET,CHEWABLE ORAL
Refills: 0 | Status: COMPLETED | OUTPATIENT
Start: 2024-04-19 | End: 2024-04-23

## 2024-04-18 RX ORDER — CHLORHEXIDINE GLUCONATE 213 G/1000ML
15 SOLUTION TOPICAL ONCE
Refills: 0 | Status: DISCONTINUED | OUTPATIENT
Start: 2024-04-18 | End: 2024-04-19

## 2024-04-18 RX ADMIN — VASOPRESSIN 6 UNIT(S)/MIN: 20 INJECTION INTRAVENOUS at 22:27

## 2024-04-18 RX ADMIN — SODIUM CHLORIDE 3 MILLILITER(S): 9 INJECTION INTRAMUSCULAR; INTRAVENOUS; SUBCUTANEOUS at 05:37

## 2024-04-18 RX ADMIN — Medication 1500 MILLILITER(S): at 20:36

## 2024-04-18 RX ADMIN — Medication 4.88 MICROGRAM(S)/KG/MIN: at 20:41

## 2024-04-18 RX ADMIN — Medication 4 MILLIGRAM(S): at 20:41

## 2024-04-18 RX ADMIN — Medication 3 MILLILITER(S): at 21:42

## 2024-04-18 RX ADMIN — PANTOPRAZOLE SODIUM 40 MILLIGRAM(S): 20 TABLET, DELAYED RELEASE ORAL at 20:36

## 2024-04-18 RX ADMIN — INSULIN HUMAN 2 UNIT(S)/HR: 100 INJECTION, SOLUTION SUBCUTANEOUS at 20:40

## 2024-04-18 RX ADMIN — Medication 4.88 MICROGRAM(S)/KG/MIN: at 22:27

## 2024-04-18 RX ADMIN — BUDESONIDE AND FORMOTEROL FUMARATE DIHYDRATE 2 PUFF(S): 160; 4.5 AEROSOL RESPIRATORY (INHALATION) at 09:28

## 2024-04-18 RX ADMIN — Medication 100 GRAM(S): at 20:36

## 2024-04-18 RX ADMIN — Medication 100 GRAM(S): at 22:28

## 2024-04-18 RX ADMIN — PANTOPRAZOLE SODIUM 40 MILLIGRAM(S): 20 TABLET, DELAYED RELEASE ORAL at 06:21

## 2024-04-18 RX ADMIN — VASOPRESSIN 6 UNIT(S)/MIN: 20 INJECTION INTRAVENOUS at 20:40

## 2024-04-18 RX ADMIN — AMLODIPINE BESYLATE 10 MILLIGRAM(S): 2.5 TABLET ORAL at 06:21

## 2024-04-18 NOTE — CONSULT NOTE ADULT - SUBJECTIVE AND OBJECTIVE BOX
PULMONARY CONSULT NOTE      ART [id number]    Patient is a 84y old  Male who presents with a chief complaint of [place] [facility] [place] (18 Apr 2024 00:39)      HISTORY OF PRESENT ILLNESS: Hx of moderate COPD (FEV1 68% pred on last PFT in the office), emphysema, bronchiectasis, lung nodules, not on home O2. Former smoker. Sees Dr Solorio. Denies ROJO at baseline. No chronic cough. No wheeze.     Also, hx  PMHx CAD, HLD, HTN,    Statin intolerance, CKD3 (baseline sCr 1.4),  pericarditis and possible adrenal tumor/ pheochromocytoma. In 2019 STEMI and underwent cath which showed moderate TVD and normal EF - no intervention was diagnosed with pericarditis. Has close follow up with his cardiologist and nephrologist for new onset secondary HTN (reportedly SBP 200s). Imaging in 1/2022 noted 8mm left adrenal nodule (repeat 1/2023 showing stable appearance), pheochromocytoma workup indeterminate. MR abdomen cannot rule out neuroendocrine tumor. Was started on beta blocker and diuretics with improvement in blood pressure. Patient with progressive ROJO and intermittent lower extremity edema. Had CTA of coronaries to follow up on progression of his CAD which revealed total calcium score of 3065. Now s/p elective LHC today showing LM and severe TVD. Transfered to [facility] for CABG eval.     Called for preop eval.    Pt feels at baseline. No wheeze, cough, sob.       MEDICATIONS  (STANDING):  amLODIPine   Tablet 10 milliGRAM(s) Oral daily  budesonide  80 MICROgram(s)/formoterol 4.5 MICROgram(s) Inhaler 2 Puff(s) Inhalation two times a day  carvedilol 6.25 milliGRAM(s) Oral <User Schedule>  dextrose 50% Injectable 25 Gram(s) IV Push once  dextrose 50% Injectable 12.5 Gram(s) IV Push once  finasteride 5 milliGRAM(s) Oral daily  glucagon  Injectable 1 milliGRAM(s) IntraMuscular once  insulin lispro (ADMELOG) corrective regimen sliding scale   SubCutaneous Before meals and at bedtime  mupirocin 2% Ointment 1 Application(s) Both Nostrils every 12 hours  pantoprazole    Tablet 40 milliGRAM(s) Oral before breakfast  sodium chloride 0.9% lock flush 3 milliLiter(s) IV Push every 8 hours  sodium chloride 0.9%. 1000 milliLiter(s) (50 mL/Hr) IV Continuous <Continuous>      MEDICATIONS  (PRN):  dextrose Oral Gel 15 Gram(s) Oral once PRN Blood Glucose LESS THAN 70 milliGRAM(s)/deciliter      Allergies    Levaquin (Unknown)  ampicillin (Unknown)  penicillin (Unknown)    Intolerances    Lipitor (Unknown)      PAST MEDICAL & SURGICAL HISTORY:  CAD (coronary artery disease)      HLD (hyperlipidemia)      H/O secondary hypertension      Asthma      History of COPD      Former smoker      Statin intolerance      Stage 3 chronic kidney disease      PNA (pneumonia)      Empyema          FAMILY HISTORY: noncontributory      SOCIAL HISTORY  Smoking History: former    REVIEW OF SYSTEMS:    CONSTITUTIONAL:  No fevers, chills, sweats    HEENT:  Eyes:  No diplopia or blurred vision. ENT:  No earache, sore throat or runny nose.    CARDIOVASCULAR:  per HPI    RESPIRATORY: per HPI      GASTROINTESTINAL:  No abdominal pain, nausea, vomiting or diarrhea.    GENITOURINARY:  No dysuria, frequency or urgency.    NEUROLOGIC:  No paresthesias, fasciculations, seizures or weakness.    PSYCHIATRIC:  No disorder of thought or mood.    Vital Signs Last 24 Hrs  T(C): 36.6 (18 Apr 2024 08:30), Max: 36.8 (17 Apr 2024 17:30)  T(F): 97.9 (18 Apr 2024 08:30), Max: 98.2 (17 Apr 2024 17:30)  HR: 84 (18 Apr 2024 09:28) (47 - 84)  BP: 138/65 (18 Apr 2024 08:30) (115/57 - 138/73)  BP(mean): --  RR: 18 (18 Apr 2024 08:30) (16 - 18)  SpO2: 96% (18 Apr 2024 08:30) (94% - 99%)    Parameters below as of 18 Apr 2024 09:28  Patient On (Oxygen Delivery Method): room air        PHYSICAL EXAMINATION:    GENERAL: The patient is  in no apparent distress.     HEENT: Head is normocephalic and atraumatic. Mucous membranes are moist.     NECK: Supple.     LUNGS: Clear to auscultation without wheezing, rales, or rhonchi. Respirations unlabored    HEART: Regular rate and rhythm; sytolic murmur      ABDOMEN: Soft, nontender, and nondistended.      EXTREMITIES: Without any cyanosis, clubbing, rash, lesions or edema.    NEUROLOGIC: Grossly intact.      LABS:                        14.2   8.37  )-----------( 276      ( 18 Apr 2024 05:45 )             42.2     04-18    141  |  107  |  28.9<H>  ----------------------------<  87  4.2   |  23.0  |  1.60<H>    Ca    8.4      18 Apr 2024 05:45  Mg     1.8     04-18    TPro  7.6  /  Alb  4.2  /  TBili  0.5  /  DBili  x   /  AST  24  /  ALT  12  /  AlkPhos  92  04-17    PT/INR - ( 17 Apr 2024 17:55 )   PT: 10.6 sec;   INR: 0.95 ratio         PTT - ( 17 Apr 2024 17:55 )  PTT:33.9 sec      ABG - ( 17 Apr 2024 21:49 )  pH, Arterial: 7.460 pH, Blood: x     /  pCO2: 34    /  pO2: 113   / HCO3: 24    / Base Excess: 0.4   /  SaO2: 99.1                RADIOLOGY & ADDITIONAL STUDIES:  < from: CT Chest No Cont (04.17.24 @ 19:31) >    ACC: [id number] EXAM:  CT CHEST   ORDERED BY: MONICA ADDISON     PROCEDURE DATE:  04/17/2024          INTERPRETATION:  INDICATION: History of COPD, preoperative evaluation    TECHNIQUE: Helical acquisition images of the chest without intravenous   contrast. Maximum intensity projection images were generated.    COMPARISON: CT chest 8/3/2019    FINDINGS:    LUNGS/AIRWAYS/PLEURA: Small volume secretions in the trachea. Emphysema.   New left upper lobe 5 mm solid nodule (3-32). Increased size of 1.6 cm   right apical nodular opacity (3-17), prior 1.2 cm. Unchanged 0.7 cm   groundglass nodule in the apical left upper lobe. Linear atelectasis in   the lingula. No pleural effusion.    LYMPH NODES/MEDIASTINUM: No lymphadenopathy.    HEART/VASCULATURE: Multichamber cardiac enlargement. Multivessel coronary   artery calcified plaque. Calcified aortic valve. No pericardial effusion.   Approximate 4 cm ascending aorta at the level of the right pulmonary   artery.    UPPER ABDOMEN: Bilateral renal cysts. Hyper dense material within both   ureters.    BONES/SOFT TISSUES: Degenerative changes of the spine. Multiple old rib   fractures. Unchanged multiple compression fractures.        IMPRESSION:    Since 8/3/2019:    New left upper lobe 5 mm solidnodule, and mild increased size of a 1.6   cm nodular opacity in the right upper lobe. These are indeterminate.   Recommend surveillance CT chest in 6 months.    Emphysema.    --- End of Report ---            KATHY CONTRERAS M.D., ATTENDING RADIOLOGIST  This document has been electronically signed. Apr 17 2024  8:31PM    < end of copied text >

## 2024-04-18 NOTE — BRIEF OPERATIVE NOTE - NSICDXBRIEFPREOP_GEN_ALL_CORE_FT
PRE-OP DIAGNOSIS:  CAD (coronary artery disease) 18-Apr-2024 19:05:07  Jarred Reed  AS (aortic stenosis) 18-Apr-2024 19:05:21  Jarred Reed

## 2024-04-18 NOTE — CONSULT NOTE ADULT - SUBJECTIVE AND OBJECTIVE BOX
Patient is a 84y old  Male who presents with a chief complaint of Transfer from [facility] [place] CABG eval (2024 00:39)       HPI:  84 male PMHx CAD, HLD, HTN, asthma, COPD, former smoker (4.5 cigars/daily, ~ 50years, quit 2018), Statin intolerance, CKD3 (baseline sCr 1.4), PNA/Empyema, pericarditis and possible adrenal tumor/ pheochromocytoma. In 2019 STEMI and underwent cath which showed moderate TVD and normal EF - no intervention was diagnosed with pericarditis. Has close follow up with his cardiologist and nephrologist for new onset secondary HTN (reportedly SBP 200s). Imaging in 2022 noted 8mm left adrenal nodule (repeat 2023 showing stable appearance), pheochromocytoma workup indeterminate. MR abdomen cannot rule out neuroendocrine tumor. Was started on beta blocker and diuretics with improvement in blood pressure. Patient with progressive ROJO and intermittent lower extremity edema. Had CTA of coronaries to follow up on progression of his CAD which revealed total calcium score of 3065. Now s/p elective LHC today showing LM and severe TVD. Transfered to [facility] for CABG eval.  Patient denies any chest pain, palpitations, HA, abd pain, n/v, dizziness, diaphoresis, syncope, changes in bowel bladder function, weight gain/loss. States "normal health."   (2024 18:30)   Hx BPH on po meds.    PAST MEDICAL & SURGICAL HISTORY:  CAD (coronary artery disease)      HLD (hyperlipidemia)      H/O secondary hypertension      Asthma      History of COPD      Former smoker      Statin intolerance      Stage 3 chronic kidney disease      PNA (pneumonia)      Empyema      Inguinal  hernias     FAMILY HISTORY:  NC    Social History: Prior smoker    MEDICATIONS  (STANDING):  amLODIPine   Tablet 10 milliGRAM(s) Oral daily  budesonide  80 MICROgram(s)/formoterol 4.5 MICROgram(s) Inhaler 2 Puff(s) Inhalation two times a day  carvedilol 6.25 milliGRAM(s) Oral <User Schedule>  dextrose 50% Injectable 25 Gram(s) IV Push once  dextrose 50% Injectable 12.5 Gram(s) IV Push once  finasteride 5 milliGRAM(s) Oral daily  glucagon  Injectable 1 milliGRAM(s) IntraMuscular once  insulin lispro (ADMELOG) corrective regimen sliding scale   SubCutaneous Before meals and at bedtime  mupirocin 2% Ointment 1 Application(s) Both Nostrils every 12 hours  pantoprazole    Tablet 40 milliGRAM(s) Oral before breakfast  sodium chloride 0.9% lock flush 3 milliLiter(s) IV Push every 8 hours  sodium chloride 0.9%. 1000 milliLiter(s) (50 mL/Hr) IV Continuous <Continuous>    MEDICATIONS  (PRN):  dextrose Oral Gel 15 Gram(s) Oral once PRN Blood Glucose LESS THAN 70 milliGRAM(s)/deciliter   Meds reviewed    Allergies    Levaquin (Unknown)  ampicillin (Unknown)  penicillin (Unknown)    Intolerances    Lipitor (Unknown)      REVIEW OF SYSTEMS:    CONSTITUTIONAL:  Neg  EYES: No eye pain, visual disturbances, or discharge  ENMT:  No difficulty hearing, tinnitus, vertigo; No sinus or throat pain  NECK: No pain or stiffness  BREASTS: No pain, masses, or nipple discharge  RESPIRATORY: neg  CARDIOVASCULAR: No chest pain, palpitations, dizziness,   GASTROINTESTINAL: No abdominal or epigastric pain. No nausea, vomiting, or hematemesis; No diarrhea or constipation. No melena or hematochezia.  GENITOURINARY: No dysuria, frequency, hematuria, or incontinence  NEUROLOGICAL: No headaches, memory loss, loss of strength, numbness, or tremors  SKIN: Neg  LYMPH NODES: No enlarged glands  ENDOCRINE: No heat or cold intolerance; No hair loss  MUSCULOSKELETAL: neg  PSYCHIATRIC: No depression, anxiety, mood swings, or difficulty sleeping  HEME/LYMPH: No easy bruising, or bleeding gums  ALLERY AND IMMUNOLOGIC: No hives or eczema      Vital Signs Last 24 Hrs  T(C): 36.6 (2024 08:30), Max: 36.8 (2024 17:30)  T(F): 97.9 (2024 08:30), Max: 98.2 (2024 17:30)  HR: 84 (2024 09:28) (47 - 84)  BP: 138/65 (2024 08:30) (115/57 - 138/73)  BP(mean): --  RR: 18 (2024 08:30) (16 - 18)  SpO2: 96% (2024 08:30) (94% - 99%)    Parameters below as of 2024 09:28  Patient On (Oxygen Delivery Method): room air      Daily Height in cm: 162.56 (2024 17:30)    Daily Weight in k.5 (2024 04:40)    PHYSICAL EXAM:    GENERAL: appears comfortable oob in  chair  HEAD:  Atraumatic, Normocephalic  EYES: EOMI, PERRLA, conjunctiva and sclera clear  ENMT: No tonsillar erythema, exudates, or enlargement; Moist mucous membranes, Good dentition, No lesions  NECK: Supple, neck  veins flat  NERVOUS SYSTEM:  Alert & Oriented X3, Good concentration; Motor Strength wnl upper and lower extremities;  CHEST/LUNG: No 02, poor  air motion; No rales, rhonchi, wheezing, or rubs  HEART: Regular rate and rhythm; No  rubs, or gallops; Systolic  murmur aortic  area  ABDOMEN: Soft, Nontender, Nondistended; Bowel sounds present, body wall and flank edema  EXTREMITIES:  No edema, has stasis  changes lower lkegs  LYMPH: No lymphadenopathy noted  SKIN: No rashes or lesions, pale  : Neg    LABS:                        14.2   8.37  )-----------( 276      ( 2024 05:45 )             42.2         141  |  107  |  28.9<H>  ----------------------------<  87  4.2   |  23.0  |  1.60<H>    Ca    8.4      2024 05:45  Mg     1.8         TPro  7.6  /  Alb  4.2  /  TBili  0.5  /  DBili  x   /  AST  24  /  ALT  12  /  AlkPhos  92      PT/INR - ( 2024 17:55 )   PT: 10.6 sec;   INR: 0.95 ratio         PTT - ( 2024 17:55 )  PTT:33.9 sec  Urinalysis Basic - ( 2024 05:45 )    Color: x / Appearance: x / SG: x / pH: x  Gluc: 87 mg/dL / Ketone: x  / Bili: x / Urobili: x   Blood: x / Protein: x / Nitrite: x   Leuk Esterase: x / RBC: x / WBC x   Sq Epi: x / Non Sq Epi: x / Bacteria: x      Magnesium: 1.8 mg/dL ( @ 05:45)  Magnesium: 2.0 mg/dL ( @ 17:55)    ABG - ( 2024 21:49 )  pH, Arterial: 7.460 pH, Blood: x     /  pCO2: 34    /  pO2: 113   / HCO3: 24    / Base Excess: 0.4   /  SaO2: 99.1                  RADIOLOGY & ADDITIONAL TESTS:

## 2024-04-18 NOTE — BRIEF OPERATIVE NOTE - NSICDXBRIEFPOSTOP_GEN_ALL_CORE_FT
POST-OP DIAGNOSIS:  CAD (coronary artery disease) 18-Apr-2024 19:05:41  Jarred Reed  Aortic stenosis 18-Apr-2024 19:06:07  Jarred Reed

## 2024-04-18 NOTE — CHART NOTE - NSCHARTNOTEFT_GEN_A_CORE
Discussed case with patient's Nephrologist at [facility], Dr. Vora, regarding workup for adrenal nodule and pheochromocytoma. Patient has had extensive work up over past two years. All results remains indeterminate for pheo, however, cannot rule out neuroendocrine tumur. Dr. Vora states patient was also evaluated at [facility]. Never warranted surgical resection. BP has been well controlled on minimal antihypertensives. No absolute contraindication to move forward for CABG/AVR. Treat blood pressure appropriately in the setting of possible pheochromocytoma. Discussed with Dr. Cho & Dr. Sloan. Patient cleared for OR.

## 2024-04-19 LAB
ALBUMIN SERPL ELPH-MCNC: 3.2 G/DL — LOW (ref 3.3–5.2)
ALDOST SERPL-MCNC: 27.2 NG/DL — HIGH
ALP SERPL-CCNC: 46 U/L — SIGNIFICANT CHANGE UP (ref 40–120)
ALT FLD-CCNC: 25 U/L — SIGNIFICANT CHANGE UP
ANION GAP SERPL CALC-SCNC: 13 MMOL/L — SIGNIFICANT CHANGE UP (ref 5–17)
ANION GAP SERPL CALC-SCNC: 13 MMOL/L — SIGNIFICANT CHANGE UP (ref 5–17)
ANION GAP SERPL CALC-SCNC: 16 MMOL/L — SIGNIFICANT CHANGE UP (ref 5–17)
ANION GAP SERPL CALC-SCNC: 20 MMOL/L — HIGH (ref 5–17)
APTT BLD: 33.4 SEC — SIGNIFICANT CHANGE UP (ref 24.5–35.6)
APTT BLD: 35 SEC — SIGNIFICANT CHANGE UP (ref 24.5–35.6)
AST SERPL-CCNC: 66 U/L — HIGH
BILIRUB SERPL-MCNC: 1.6 MG/DL — SIGNIFICANT CHANGE UP (ref 0.4–2)
BUN SERPL-MCNC: 22.1 MG/DL — HIGH (ref 8–20)
BUN SERPL-MCNC: 29.7 MG/DL — HIGH (ref 8–20)
BUN SERPL-MCNC: 31.3 MG/DL — HIGH (ref 8–20)
BUN SERPL-MCNC: 33.3 MG/DL — HIGH (ref 8–20)
CALCIUM SERPL-MCNC: 8.3 MG/DL — LOW (ref 8.4–10.5)
CALCIUM SERPL-MCNC: 8.3 MG/DL — LOW (ref 8.4–10.5)
CALCIUM SERPL-MCNC: 8.4 MG/DL — SIGNIFICANT CHANGE UP (ref 8.4–10.5)
CALCIUM SERPL-MCNC: 8.7 MG/DL — SIGNIFICANT CHANGE UP (ref 8.4–10.5)
CHLORIDE SERPL-SCNC: 104 MMOL/L — SIGNIFICANT CHANGE UP (ref 96–108)
CHLORIDE SERPL-SCNC: 106 MMOL/L — SIGNIFICANT CHANGE UP (ref 96–108)
CHLORIDE SERPL-SCNC: 106 MMOL/L — SIGNIFICANT CHANGE UP (ref 96–108)
CHLORIDE SERPL-SCNC: 107 MMOL/L — SIGNIFICANT CHANGE UP (ref 96–108)
CK MB CFR SERPL CALC: 45.8 NG/ML — HIGH (ref 0–6.7)
CK SERPL-CCNC: 475 U/L — HIGH (ref 30–200)
CO2 SERPL-SCNC: 20 MMOL/L — LOW (ref 22–29)
CO2 SERPL-SCNC: 23 MMOL/L — SIGNIFICANT CHANGE UP (ref 22–29)
CO2 SERPL-SCNC: 24 MMOL/L — SIGNIFICANT CHANGE UP (ref 22–29)
CO2 SERPL-SCNC: 24 MMOL/L — SIGNIFICANT CHANGE UP (ref 22–29)
CREAT ?TM UR-MCNC: 67 MG/DL — SIGNIFICANT CHANGE UP
CREAT SERPL-MCNC: 1.37 MG/DL — HIGH (ref 0.5–1.3)
CREAT SERPL-MCNC: 1.52 MG/DL — HIGH (ref 0.5–1.3)
CREAT SERPL-MCNC: 1.6 MG/DL — HIGH (ref 0.5–1.3)
CREAT SERPL-MCNC: 1.65 MG/DL — HIGH (ref 0.5–1.3)
EGFR: 40 ML/MIN/1.73M2 — LOW
EGFR: 42 ML/MIN/1.73M2 — LOW
EGFR: 45 ML/MIN/1.73M2 — LOW
EGFR: 51 ML/MIN/1.73M2 — LOW
GAS PNL BLDA: SIGNIFICANT CHANGE UP
GLUCOSE BLDC GLUCOMTR-MCNC: 101 MG/DL — HIGH (ref 70–99)
GLUCOSE BLDC GLUCOMTR-MCNC: 103 MG/DL — HIGH (ref 70–99)
GLUCOSE BLDC GLUCOMTR-MCNC: 107 MG/DL — HIGH (ref 70–99)
GLUCOSE BLDC GLUCOMTR-MCNC: 112 MG/DL — HIGH (ref 70–99)
GLUCOSE BLDC GLUCOMTR-MCNC: 114 MG/DL — HIGH (ref 70–99)
GLUCOSE BLDC GLUCOMTR-MCNC: 115 MG/DL — HIGH (ref 70–99)
GLUCOSE BLDC GLUCOMTR-MCNC: 119 MG/DL — HIGH (ref 70–99)
GLUCOSE BLDC GLUCOMTR-MCNC: 122 MG/DL — HIGH (ref 70–99)
GLUCOSE BLDC GLUCOMTR-MCNC: 123 MG/DL — HIGH (ref 70–99)
GLUCOSE BLDC GLUCOMTR-MCNC: 123 MG/DL — HIGH (ref 70–99)
GLUCOSE BLDC GLUCOMTR-MCNC: 131 MG/DL — HIGH (ref 70–99)
GLUCOSE BLDC GLUCOMTR-MCNC: 133 MG/DL — HIGH (ref 70–99)
GLUCOSE BLDC GLUCOMTR-MCNC: 133 MG/DL — HIGH (ref 70–99)
GLUCOSE BLDC GLUCOMTR-MCNC: 136 MG/DL — HIGH (ref 70–99)
GLUCOSE BLDC GLUCOMTR-MCNC: 152 MG/DL — HIGH (ref 70–99)
GLUCOSE BLDC GLUCOMTR-MCNC: 159 MG/DL — HIGH (ref 70–99)
GLUCOSE BLDC GLUCOMTR-MCNC: 187 MG/DL — HIGH (ref 70–99)
GLUCOSE BLDC GLUCOMTR-MCNC: 218 MG/DL — HIGH (ref 70–99)
GLUCOSE BLDC GLUCOMTR-MCNC: 92 MG/DL — SIGNIFICANT CHANGE UP (ref 70–99)
GLUCOSE BLDC GLUCOMTR-MCNC: 97 MG/DL — SIGNIFICANT CHANGE UP (ref 70–99)
GLUCOSE BLDC GLUCOMTR-MCNC: 99 MG/DL — SIGNIFICANT CHANGE UP (ref 70–99)
GLUCOSE SERPL-MCNC: 118 MG/DL — HIGH (ref 70–99)
GLUCOSE SERPL-MCNC: 134 MG/DL — HIGH (ref 70–99)
GLUCOSE SERPL-MCNC: 143 MG/DL — HIGH (ref 70–99)
GLUCOSE SERPL-MCNC: 95 MG/DL — SIGNIFICANT CHANGE UP (ref 70–99)
HCT VFR BLD CALC: 30.6 % — LOW (ref 39–50)
HCT VFR BLD CALC: 35.7 % — LOW (ref 39–50)
HGB BLD-MCNC: 10.2 G/DL — LOW (ref 13–17)
HGB BLD-MCNC: 12.2 G/DL — LOW (ref 13–17)
INR BLD: 1.18 RATIO — SIGNIFICANT CHANGE UP (ref 0.85–1.18)
INR BLD: 1.19 RATIO — HIGH (ref 0.85–1.18)
MAGNESIUM SERPL-MCNC: 2.3 MG/DL — SIGNIFICANT CHANGE UP (ref 1.6–2.6)
MCHC RBC-ENTMCNC: 30.5 PG — SIGNIFICANT CHANGE UP (ref 27–34)
MCHC RBC-ENTMCNC: 31 PG — SIGNIFICANT CHANGE UP (ref 27–34)
MCHC RBC-ENTMCNC: 33.3 GM/DL — SIGNIFICANT CHANGE UP (ref 32–36)
MCHC RBC-ENTMCNC: 34.2 GM/DL — SIGNIFICANT CHANGE UP (ref 32–36)
MCV RBC AUTO: 90.6 FL — SIGNIFICANT CHANGE UP (ref 80–100)
MCV RBC AUTO: 91.6 FL — SIGNIFICANT CHANGE UP (ref 80–100)
PLATELET # BLD AUTO: 159 K/UL — SIGNIFICANT CHANGE UP (ref 150–400)
PLATELET # BLD AUTO: 172 K/UL — SIGNIFICANT CHANGE UP (ref 150–400)
POTASSIUM SERPL-MCNC: 3.9 MMOL/L — SIGNIFICANT CHANGE UP (ref 3.5–5.3)
POTASSIUM SERPL-MCNC: 4.8 MMOL/L — SIGNIFICANT CHANGE UP (ref 3.5–5.3)
POTASSIUM SERPL-MCNC: 4.9 MMOL/L — SIGNIFICANT CHANGE UP (ref 3.5–5.3)
POTASSIUM SERPL-MCNC: 5.6 MMOL/L — HIGH (ref 3.5–5.3)
POTASSIUM SERPL-SCNC: 3.9 MMOL/L — SIGNIFICANT CHANGE UP (ref 3.5–5.3)
POTASSIUM SERPL-SCNC: 4.8 MMOL/L — SIGNIFICANT CHANGE UP (ref 3.5–5.3)
POTASSIUM SERPL-SCNC: 4.9 MMOL/L — SIGNIFICANT CHANGE UP (ref 3.5–5.3)
POTASSIUM SERPL-SCNC: 5.6 MMOL/L — HIGH (ref 3.5–5.3)
PROT ?TM UR-MCNC: 20 MG/DL — HIGH (ref 0–12)
PROT SERPL-MCNC: 4.7 G/DL — LOW (ref 6.6–8.7)
PROTHROM AB SERPL-ACNC: 13 SEC — SIGNIFICANT CHANGE UP (ref 9.5–13)
PROTHROM AB SERPL-ACNC: 13.1 SEC — HIGH (ref 9.5–13)
RBC # BLD: 3.34 M/UL — LOW (ref 4.2–5.8)
RBC # BLD: 3.94 M/UL — LOW (ref 4.2–5.8)
RBC # FLD: 16.4 % — HIGH (ref 10.3–14.5)
RBC # FLD: 16.8 % — HIGH (ref 10.3–14.5)
SODIUM SERPL-SCNC: 141 MMOL/L — SIGNIFICANT CHANGE UP (ref 135–145)
SODIUM SERPL-SCNC: 143 MMOL/L — SIGNIFICANT CHANGE UP (ref 135–145)
SODIUM SERPL-SCNC: 145 MMOL/L — SIGNIFICANT CHANGE UP (ref 135–145)
SODIUM SERPL-SCNC: 146 MMOL/L — HIGH (ref 135–145)
TROPONIN T, HIGH SENSITIVITY RESULT: 3164 NG/L — HIGH (ref 0–51)
WBC # BLD: 13.61 K/UL — HIGH (ref 3.8–10.5)
WBC # BLD: 14.91 K/UL — HIGH (ref 3.8–10.5)
WBC # FLD AUTO: 13.61 K/UL — HIGH (ref 3.8–10.5)
WBC # FLD AUTO: 14.91 K/UL — HIGH (ref 3.8–10.5)

## 2024-04-19 PROCEDURE — 76775 US EXAM ABDO BACK WALL LIM: CPT | Mod: 26

## 2024-04-19 PROCEDURE — 99291 CRITICAL CARE FIRST HOUR: CPT | Mod: FS

## 2024-04-19 PROCEDURE — 93010 ELECTROCARDIOGRAM REPORT: CPT

## 2024-04-19 PROCEDURE — 99233 SBSQ HOSP IP/OBS HIGH 50: CPT

## 2024-04-19 PROCEDURE — 99291 CRITICAL CARE FIRST HOUR: CPT

## 2024-04-19 PROCEDURE — 71045 X-RAY EXAM CHEST 1 VIEW: CPT | Mod: 26

## 2024-04-19 RX ORDER — SODIUM BICARBONATE 1 MEQ/ML
50 SYRINGE (ML) INTRAVENOUS ONCE
Refills: 0 | Status: COMPLETED | OUTPATIENT
Start: 2024-04-19 | End: 2024-04-19

## 2024-04-19 RX ORDER — CEFUROXIME AXETIL 250 MG
1500 TABLET ORAL EVERY 8 HOURS
Refills: 0 | Status: COMPLETED | OUTPATIENT
Start: 2024-04-19 | End: 2024-04-20

## 2024-04-19 RX ORDER — POTASSIUM CHLORIDE 20 MEQ
10 PACKET (EA) ORAL
Refills: 0 | Status: DISCONTINUED | OUTPATIENT
Start: 2024-04-19 | End: 2024-04-19

## 2024-04-19 RX ORDER — HEPARIN SODIUM 5000 [USP'U]/ML
5000 INJECTION INTRAVENOUS; SUBCUTANEOUS EVERY 8 HOURS
Refills: 0 | Status: DISCONTINUED | OUTPATIENT
Start: 2024-04-19 | End: 2024-04-21

## 2024-04-19 RX ORDER — DOBUTAMINE HCL 250MG/20ML
1.25 VIAL (ML) INTRAVENOUS
Qty: 500 | Refills: 0 | Status: DISCONTINUED | OUTPATIENT
Start: 2024-04-19 | End: 2024-04-21

## 2024-04-19 RX ORDER — LANOLIN ALCOHOL/MO/W.PET/CERES
5 CREAM (GRAM) TOPICAL AT BEDTIME
Refills: 0 | Status: DISCONTINUED | OUTPATIENT
Start: 2024-04-19 | End: 2024-04-28

## 2024-04-19 RX ORDER — CLOPIDOGREL BISULFATE 75 MG/1
75 TABLET, FILM COATED ORAL DAILY
Refills: 0 | Status: DISCONTINUED | OUTPATIENT
Start: 2024-04-20 | End: 2024-04-26

## 2024-04-19 RX ORDER — ALBUMIN HUMAN 25 %
250 VIAL (ML) INTRAVENOUS ONCE
Refills: 0 | Status: COMPLETED | OUTPATIENT
Start: 2024-04-19 | End: 2024-04-19

## 2024-04-19 RX ORDER — VANCOMYCIN HCL 1 G
750 VIAL (EA) INTRAVENOUS EVERY 24 HOURS
Refills: 0 | Status: COMPLETED | OUTPATIENT
Start: 2024-04-19 | End: 2024-04-20

## 2024-04-19 RX ORDER — CLOPIDOGREL BISULFATE 75 MG/1
75 TABLET, FILM COATED ORAL ONCE
Refills: 0 | Status: COMPLETED | OUTPATIENT
Start: 2024-04-19 | End: 2024-04-19

## 2024-04-19 RX ORDER — DEXTROSE 50 % IN WATER 50 %
50 SYRINGE (ML) INTRAVENOUS ONCE
Refills: 0 | Status: COMPLETED | OUTPATIENT
Start: 2024-04-19 | End: 2024-04-19

## 2024-04-19 RX ORDER — INSULIN LISPRO 100/ML
VIAL (ML) SUBCUTANEOUS
Refills: 0 | Status: DISCONTINUED | OUTPATIENT
Start: 2024-04-19 | End: 2024-04-23

## 2024-04-19 RX ORDER — ACETAMINOPHEN 500 MG
1000 TABLET ORAL ONCE
Refills: 0 | Status: COMPLETED | OUTPATIENT
Start: 2024-04-19 | End: 2024-04-19

## 2024-04-19 RX ORDER — CALCIUM GLUCONATE 100 MG/ML
1 VIAL (ML) INTRAVENOUS ONCE
Refills: 0 | Status: COMPLETED | OUTPATIENT
Start: 2024-04-19 | End: 2024-04-19

## 2024-04-19 RX ORDER — FENTANYL CITRATE 50 UG/ML
25 INJECTION INTRAVENOUS ONCE
Refills: 0 | Status: DISCONTINUED | OUTPATIENT
Start: 2024-04-19 | End: 2024-04-19

## 2024-04-19 RX ORDER — INSULIN LISPRO 100/ML
2 VIAL (ML) SUBCUTANEOUS
Refills: 0 | Status: DISCONTINUED | OUTPATIENT
Start: 2024-04-19 | End: 2024-04-19

## 2024-04-19 RX ORDER — INSULIN HUMAN 100 [IU]/ML
10 INJECTION, SOLUTION SUBCUTANEOUS ONCE
Refills: 0 | Status: COMPLETED | OUTPATIENT
Start: 2024-04-19 | End: 2024-04-19

## 2024-04-19 RX ADMIN — VASOPRESSIN 6 UNIT(S)/MIN: 20 INJECTION INTRAVENOUS at 00:53

## 2024-04-19 RX ADMIN — SENNA PLUS 2 TABLET(S): 8.6 TABLET ORAL at 21:15

## 2024-04-19 RX ADMIN — Medication 975 MILLIGRAM(S): at 23:52

## 2024-04-19 RX ADMIN — Medication 50 MILLILITER(S): at 11:32

## 2024-04-19 RX ADMIN — Medication 100 MILLIGRAM(S): at 13:31

## 2024-04-19 RX ADMIN — Medication 50 MILLIEQUIVALENT(S): at 01:16

## 2024-04-19 RX ADMIN — OXYCODONE HYDROCHLORIDE 5 MILLIGRAM(S): 5 TABLET ORAL at 16:00

## 2024-04-19 RX ADMIN — Medication 125 MILLILITER(S): at 11:51

## 2024-04-19 RX ADMIN — FENTANYL CITRATE 25 MICROGRAM(S): 50 INJECTION INTRAVENOUS at 03:06

## 2024-04-19 RX ADMIN — HEPARIN SODIUM 5000 UNIT(S): 5000 INJECTION INTRAVENOUS; SUBCUTANEOUS at 13:31

## 2024-04-19 RX ADMIN — Medication 3 MILLILITER(S): at 04:07

## 2024-04-19 RX ADMIN — Medication 100 MILLIGRAM(S): at 05:00

## 2024-04-19 RX ADMIN — INSULIN HUMAN 10 UNIT(S): 100 INJECTION, SOLUTION SUBCUTANEOUS at 11:32

## 2024-04-19 RX ADMIN — OXYCODONE HYDROCHLORIDE 5 MILLIGRAM(S): 5 TABLET ORAL at 14:59

## 2024-04-19 RX ADMIN — Medication 400 MILLIGRAM(S): at 09:00

## 2024-04-19 RX ADMIN — Medication 7.8 MICROGRAM(S)/KG/MIN: at 08:45

## 2024-04-19 RX ADMIN — Medication 3 MILLILITER(S): at 13:26

## 2024-04-19 RX ADMIN — Medication 4 MILLIGRAM(S): at 11:12

## 2024-04-19 RX ADMIN — Medication 500 MILLIGRAM(S): at 17:04

## 2024-04-19 RX ADMIN — Medication 2 UNIT(S): at 11:18

## 2024-04-19 RX ADMIN — CHLORHEXIDINE GLUCONATE 15 MILLILITER(S): 213 SOLUTION TOPICAL at 05:01

## 2024-04-19 RX ADMIN — GABAPENTIN 100 MILLIGRAM(S): 400 CAPSULE ORAL at 13:31

## 2024-04-19 RX ADMIN — Medication 975 MILLIGRAM(S): at 17:03

## 2024-04-19 RX ADMIN — Medication 100 MILLIGRAM(S): at 21:16

## 2024-04-19 RX ADMIN — CHLORHEXIDINE GLUCONATE 1 APPLICATION(S): 213 SOLUTION TOPICAL at 05:01

## 2024-04-19 RX ADMIN — Medication 250 MILLIGRAM(S): at 11:39

## 2024-04-19 RX ADMIN — Medication 3 MILLILITER(S): at 09:09

## 2024-04-19 RX ADMIN — Medication 4 MILLIGRAM(S): at 05:01

## 2024-04-19 RX ADMIN — Medication 5 MILLIGRAM(S): at 23:52

## 2024-04-19 RX ADMIN — FENTANYL CITRATE 25 MICROGRAM(S): 50 INJECTION INTRAVENOUS at 04:06

## 2024-04-19 RX ADMIN — POLYETHYLENE GLYCOL 3350 17 GRAM(S): 17 POWDER, FOR SOLUTION ORAL at 11:12

## 2024-04-19 RX ADMIN — Medication 100 GRAM(S): at 01:15

## 2024-04-19 RX ADMIN — PANTOPRAZOLE SODIUM 40 MILLIGRAM(S): 20 TABLET, DELAYED RELEASE ORAL at 11:11

## 2024-04-19 RX ADMIN — Medication 3 MILLILITER(S): at 19:36

## 2024-04-19 RX ADMIN — Medication 1000 MILLIGRAM(S): at 10:00

## 2024-04-19 RX ADMIN — GABAPENTIN 100 MILLIGRAM(S): 400 CAPSULE ORAL at 21:15

## 2024-04-19 RX ADMIN — Medication 4 MILLIGRAM(S): at 00:54

## 2024-04-19 RX ADMIN — Medication 2 UNIT(S): at 16:28

## 2024-04-19 RX ADMIN — HEPARIN SODIUM 5000 UNIT(S): 5000 INJECTION INTRAVENOUS; SUBCUTANEOUS at 21:15

## 2024-04-19 RX ADMIN — CHLORHEXIDINE GLUCONATE 1 APPLICATION(S): 213 SOLUTION TOPICAL at 17:36

## 2024-04-19 RX ADMIN — Medication 100 MILLIEQUIVALENT(S): at 03:21

## 2024-04-19 RX ADMIN — Medication 325 MILLIGRAM(S): at 11:11

## 2024-04-19 RX ADMIN — CLOPIDOGREL BISULFATE 75 MILLIGRAM(S): 75 TABLET, FILM COATED ORAL at 06:18

## 2024-04-19 NOTE — PHYSICAL THERAPY INITIAL EVALUATION ADULT - ADDITIONAL COMMENTS
Pt reports living in private home with his wife who is able to assist. Pt has 2 CALIN with no handrail and no stairs inside. Independent prior to admission owns no DME.

## 2024-04-19 NOTE — DIETITIAN INITIAL EVALUATION ADULT - PERTINENT MEDS FT
MEDICATIONS  (STANDING):  acetaminophen     Tablet .. 975 milliGRAM(s) Oral every 6 hours  albuterol/ipratropium for Nebulization 3 milliLiter(s) Nebulizer every 6 hours  ascorbic acid 500 milliGRAM(s) Oral two times a day  aspirin enteric coated 325 milliGRAM(s) Oral daily  cefuroxime  IVPB 1500 milliGRAM(s) IV Intermittent every 8 hours  chlorhexidine 2% Cloths 1 Application(s) Topical two times a day  dextrose 50% Injectable 50 milliLiter(s) IV Push every 15 minutes  DOBUTamine Infusion 5 MICROgram(s)/kG/Min (7.8 mL/Hr) IV Continuous <Continuous>  gabapentin 100 milliGRAM(s) Oral every 8 hours  heparin   Injectable 5000 Unit(s) SubCutaneous every 8 hours  insulin lispro Injectable (ADMELOG) 2 Unit(s) SubCutaneous three times a day before meals  norepinephrine Infusion 0.05 MICROgram(s)/kG/Min (4.88 mL/Hr) IV Continuous <Continuous>  pantoprazole    Tablet 40 milliGRAM(s) Oral daily  polyethylene glycol 3350 17 Gram(s) Oral daily  senna 2 Tablet(s) Oral at bedtime  sodium chloride 0.9%. 1000 milliLiter(s) (10 mL/Hr) IV Continuous <Continuous>  vancomycin  IVPB 750 milliGRAM(s) IV Intermittent every 24 hours    MEDICATIONS  (PRN):  oxyCODONE    IR 10 milliGRAM(s) Oral every 4 hours PRN Severe Pain (7 - 10)

## 2024-04-19 NOTE — DIETITIAN INITIAL EVALUATION ADULT - PERTINENT LABORATORY DATA
04-19    145  |  107  |  29.7<H>  ----------------------------<  134<H>  5.6<H>   |  23.0  |  1.52<H>    Ca    8.3<L>      19 Apr 2024 10:45  Phos  3.6     04-18  Mg     2.3     04-19    TPro  4.7<L>  /  Alb  3.2<L>  /  TBili  1.6  /  DBili  x   /  AST  66<H>  /  ALT  25  /  AlkPhos  46  04-19  POCT Blood Glucose.: 218 mg/dL (04-19-24 @ 11:57)  A1C with Estimated Average Glucose Result: 5.8 % (04-18-24 @ 05:45)  A1C with Estimated Average Glucose Result: 5.5 % (04-17-24 @ 17:55)

## 2024-04-19 NOTE — DIETITIAN INITIAL EVALUATION ADULT - NSFNSGIIOFT_GEN_A_CORE
04-18-24 @ 07:01  -  04-19-24 @ 07:00  --------------------------------------------------------  OUT:    Chest Tube (mL): 610 mL    Chest Tube (mL): 1000 mL  Total OUT: 1610 mL    Total NET: -1610 mL      04-19-24 @ 07:01  -  04-19-24 @ 12:47  --------------------------------------------------------  OUT:    Chest Tube (mL): 110 mL    Chest Tube (mL): 70 mL  Total OUT: 180 mL    Total NET: -180 mL

## 2024-04-19 NOTE — DIETITIAN INITIAL EVALUATION ADULT - NS FNS DIET ORDER
Diet, Regular:   Consistent Carbohydrate {No Snacks} (CSTCHO)  DASH/TLC {Sodium & Cholesterol Restricted} (DASH) (04-19-24 @ 10:56)

## 2024-04-19 NOTE — DIETITIAN INITIAL EVALUATION ADULT - OTHER INFO
Pt is a 84 male PMHx CAD, HLD, HTN, asthma, COPD, former smoker (4.5 cigars/daily, ~ 50years, quit 2018), Statin intolerance, CKD3 (baseline sCr 1.4), PNA/Empyema, pericarditis and possible adrenal tumor/ pheochromocytoma. In 2019 STEMI and underwent cath which showed moderate TVD and normal EF - no intervention was diagnosed with pericarditis. Has close follow up with his cardiologist and nephrologist for new onset secondary HTN (reportedly SBP 200s). Imaging in 1/2022 noted 8mm left adrenal nodule (repeat 1/2023 showing stable appearance), pheochromocytoma workup indeterminate. MR abdomen cannot rule out neuroendocrine tumor. Was started on beta blocker and diuretics with improvement in blood pressure. Patient with progressive ROJO and intermittent lower extremity edema. Had CTA of coronaries to follow up on progression of his CAD which revealed total calcium score of 3065. Now s/p elective LHC today showing LM and severe TVD. Transfered to [facility] for CABG eval on 4/17. Renal clearance for possible pheochromocytoma obtained; patient now s/p CABGx3 on 4/18 with Dr. Cho. Intraoperative course with 2x PRBC, 3x PLT, 10 cryo; post op course with elevated chest tube output requiring additional 4x PRBC, 2x FFP, 500 feiba and lactic acidosis.

## 2024-04-19 NOTE — PHYSICAL THERAPY INITIAL EVALUATION ADULT - PERTINENT HX OF CURRENT PROBLEM, REHAB EVAL
[FreeTextEntry1] : cc incontinence \par 46 yo fem referred for eval incontinence \par has been present for years recently worsening \par leaks with stress and activity \par 3 vaginal deliveries \par no urgency or urge incontinence \par no dysuria \par saw dr flynn 2018 advised johnathon herculesot do 
s/p CABGx3 on 4/18 with Dr. Cho. Intraoperative course with 2x PRBC, 3x PLT, 10 cryo; post op course with elevated chest tube output requiring additional 4x PRBC, 2x FFP, 500 feiba and lactic acidosis.

## 2024-04-19 NOTE — PROGRESS NOTE ADULT - NS CRITICAL PANP GEN_ALL_CORE NUM
45 Epidermal Autograft Text: The defect edges were debeveled with a #15 scalpel blade. Given the location of the defect, shape of the defect and the proximity to free margins an epidermal autograft was deemed most appropriate. Using a sterile surgical marker, the primary defect shape was transferred to the donor site. The epidermal graft was then harvested.  The skin graft was then placed in the primary defect and oriented appropriately.

## 2024-04-19 NOTE — DIETITIAN INITIAL EVALUATION ADULT - ORAL INTAKE PTA/DIET HISTORY
Spoke to pt and family at bedside. Family reports pt eats well at home; does not follow any specific diets. Family reports pt has always been thin and has not had any weight changes. Pt denies difficulty swallowing post op. Food preferences obtained. Reviewed diet appropriate snacks/food with family to bring in.

## 2024-04-19 NOTE — PHYSICAL THERAPY INITIAL EVALUATION ADULT - REHAB POTENTIAL, PT EVAL
What Is The Reason For Today's Visit?: the risk of recurrence of previously treated lesion(s)
good, to achieve stated therapy goals

## 2024-04-19 NOTE — PROGRESS NOTE ADULT - PA/NP COMMENTS
high chest tube output and hemorrhagic shock post cardiac surgery requiring multiple transfusions, active titration of vasoactive medications, ventilator management, correction of acid/base balance; high risk for hemodynamic compromise

## 2024-04-19 NOTE — DIETITIAN INITIAL EVALUATION ADULT - PROBLEM SELECTOR PLAN 2
CKD3 baseline sCr 1.4 (am labs at [facility] 1.8)  dye load with [facility] today  hold home Eplerenone  add gentle IVF hydration, follow up chemistry  Adjust meds pr CrCl  Monitoring urine output, I&OS  Follow chemistry  Avoid nephrotoxic agents  Renal consult in AM

## 2024-04-19 NOTE — PHYSICAL THERAPY INITIAL EVALUATION ADULT - GENERAL OBSERVATIONS, REHAB EVAL
Pt received in bed + IV, + Right IJ CVC +Tele//BP, + NC O2, + pacing wires + 2 chest tubes + larkin + VCB + a-line, in 4/10 chest pain, agreeable to PT evaluation

## 2024-04-20 LAB
ANION GAP SERPL CALC-SCNC: 13 MMOL/L — SIGNIFICANT CHANGE UP (ref 5–17)
ANION GAP SERPL CALC-SCNC: 13 MMOL/L — SIGNIFICANT CHANGE UP (ref 5–17)
BUN SERPL-MCNC: 38.7 MG/DL — HIGH (ref 8–20)
BUN SERPL-MCNC: 43.1 MG/DL — HIGH (ref 8–20)
CALCIUM SERPL-MCNC: 7.9 MG/DL — LOW (ref 8.4–10.5)
CALCIUM SERPL-MCNC: 8.2 MG/DL — LOW (ref 8.4–10.5)
CHLORIDE SERPL-SCNC: 103 MMOL/L — SIGNIFICANT CHANGE UP (ref 96–108)
CHLORIDE SERPL-SCNC: 99 MMOL/L — SIGNIFICANT CHANGE UP (ref 96–108)
CO2 SERPL-SCNC: 23 MMOL/L — SIGNIFICANT CHANGE UP (ref 22–29)
CO2 SERPL-SCNC: 24 MMOL/L — SIGNIFICANT CHANGE UP (ref 22–29)
CREAT SERPL-MCNC: 1.6 MG/DL — HIGH (ref 0.5–1.3)
CREAT SERPL-MCNC: 1.72 MG/DL — HIGH (ref 0.5–1.3)
EGFR: 38 ML/MIN/1.73M2 — LOW
EGFR: 42 ML/MIN/1.73M2 — LOW
GLUCOSE BLDC GLUCOMTR-MCNC: 120 MG/DL — HIGH (ref 70–99)
GLUCOSE BLDC GLUCOMTR-MCNC: 127 MG/DL — HIGH (ref 70–99)
GLUCOSE BLDC GLUCOMTR-MCNC: 138 MG/DL — HIGH (ref 70–99)
GLUCOSE BLDC GLUCOMTR-MCNC: 176 MG/DL — HIGH (ref 70–99)
GLUCOSE SERPL-MCNC: 117 MG/DL — HIGH (ref 70–99)
GLUCOSE SERPL-MCNC: 167 MG/DL — HIGH (ref 70–99)
HCT VFR BLD CALC: 30.3 % — LOW (ref 39–50)
HGB BLD-MCNC: 10.7 G/DL — LOW (ref 13–17)
MAGNESIUM SERPL-MCNC: 2.1 MG/DL — SIGNIFICANT CHANGE UP (ref 1.6–2.6)
MCHC RBC-ENTMCNC: 31.1 PG — SIGNIFICANT CHANGE UP (ref 27–34)
MCHC RBC-ENTMCNC: 35.3 GM/DL — SIGNIFICANT CHANGE UP (ref 32–36)
MCV RBC AUTO: 88.1 FL — SIGNIFICANT CHANGE UP (ref 80–100)
PLATELET # BLD AUTO: 101 K/UL — LOW (ref 150–400)
POTASSIUM SERPL-MCNC: 4.7 MMOL/L — SIGNIFICANT CHANGE UP (ref 3.5–5.3)
POTASSIUM SERPL-MCNC: 5.4 MMOL/L — HIGH (ref 3.5–5.3)
POTASSIUM SERPL-SCNC: 4.7 MMOL/L — SIGNIFICANT CHANGE UP (ref 3.5–5.3)
POTASSIUM SERPL-SCNC: 5.4 MMOL/L — HIGH (ref 3.5–5.3)
RBC # BLD: 3.44 M/UL — LOW (ref 4.2–5.8)
RBC # FLD: 17.1 % — HIGH (ref 10.3–14.5)
SODIUM SERPL-SCNC: 136 MMOL/L — SIGNIFICANT CHANGE UP (ref 135–145)
SODIUM SERPL-SCNC: 139 MMOL/L — SIGNIFICANT CHANGE UP (ref 135–145)
WBC # BLD: 14.21 K/UL — HIGH (ref 3.8–10.5)
WBC # FLD AUTO: 14.21 K/UL — HIGH (ref 3.8–10.5)

## 2024-04-20 PROCEDURE — 99291 CRITICAL CARE FIRST HOUR: CPT

## 2024-04-20 PROCEDURE — 71045 X-RAY EXAM CHEST 1 VIEW: CPT | Mod: 26

## 2024-04-20 PROCEDURE — 93010 ELECTROCARDIOGRAM REPORT: CPT

## 2024-04-20 PROCEDURE — 99233 SBSQ HOSP IP/OBS HIGH 50: CPT

## 2024-04-20 RX ORDER — IPRATROPIUM/ALBUTEROL SULFATE 18-103MCG
3 AEROSOL WITH ADAPTER (GRAM) INHALATION EVERY 6 HOURS
Refills: 0 | Status: DISCONTINUED | OUTPATIENT
Start: 2024-04-20 | End: 2024-04-26

## 2024-04-20 RX ORDER — BUDESONIDE AND FORMOTEROL FUMARATE DIHYDRATE 160; 4.5 UG/1; UG/1
2 AEROSOL RESPIRATORY (INHALATION)
Refills: 0 | Status: DISCONTINUED | OUTPATIENT
Start: 2024-04-20 | End: 2024-04-24

## 2024-04-20 RX ORDER — FUROSEMIDE 40 MG
40 TABLET ORAL
Refills: 0 | Status: DISCONTINUED | OUTPATIENT
Start: 2024-04-20 | End: 2024-04-21

## 2024-04-20 RX ORDER — DEXAMETHASONE 0.5 MG/5ML
4 ELIXIR ORAL EVERY 6 HOURS
Refills: 0 | Status: COMPLETED | OUTPATIENT
Start: 2024-04-20 | End: 2024-04-21

## 2024-04-20 RX ADMIN — Medication 975 MILLIGRAM(S): at 13:50

## 2024-04-20 RX ADMIN — Medication 325 MILLIGRAM(S): at 12:54

## 2024-04-20 RX ADMIN — Medication 500 MILLIGRAM(S): at 06:25

## 2024-04-20 RX ADMIN — Medication 250 MILLIGRAM(S): at 10:30

## 2024-04-20 RX ADMIN — HEPARIN SODIUM 5000 UNIT(S): 5000 INJECTION INTRAVENOUS; SUBCUTANEOUS at 21:35

## 2024-04-20 RX ADMIN — CHLORHEXIDINE GLUCONATE 1 APPLICATION(S): 213 SOLUTION TOPICAL at 06:25

## 2024-04-20 RX ADMIN — Medication 5 MILLIGRAM(S): at 21:38

## 2024-04-20 RX ADMIN — Medication 3 MILLILITER(S): at 20:05

## 2024-04-20 RX ADMIN — HEPARIN SODIUM 5000 UNIT(S): 5000 INJECTION INTRAVENOUS; SUBCUTANEOUS at 06:41

## 2024-04-20 RX ADMIN — Medication 7.8 MICROGRAM(S)/KG/MIN: at 14:01

## 2024-04-20 RX ADMIN — SENNA PLUS 2 TABLET(S): 8.6 TABLET ORAL at 21:35

## 2024-04-20 RX ADMIN — Medication 2: at 16:54

## 2024-04-20 RX ADMIN — BUDESONIDE AND FORMOTEROL FUMARATE DIHYDRATE 2 PUFF(S): 160; 4.5 AEROSOL RESPIRATORY (INHALATION) at 20:47

## 2024-04-20 RX ADMIN — Medication 3 MILLILITER(S): at 15:46

## 2024-04-20 RX ADMIN — Medication 3 MILLILITER(S): at 03:24

## 2024-04-20 RX ADMIN — Medication 4 MILLIGRAM(S): at 10:30

## 2024-04-20 RX ADMIN — Medication 100 MILLIGRAM(S): at 06:25

## 2024-04-20 RX ADMIN — POLYETHYLENE GLYCOL 3350 17 GRAM(S): 17 POWDER, FOR SOLUTION ORAL at 12:54

## 2024-04-20 RX ADMIN — Medication 40 MILLIGRAM(S): at 17:02

## 2024-04-20 RX ADMIN — Medication 975 MILLIGRAM(S): at 17:03

## 2024-04-20 RX ADMIN — Medication 975 MILLIGRAM(S): at 12:54

## 2024-04-20 RX ADMIN — GABAPENTIN 100 MILLIGRAM(S): 400 CAPSULE ORAL at 21:38

## 2024-04-20 RX ADMIN — CLOPIDOGREL BISULFATE 75 MILLIGRAM(S): 75 TABLET, FILM COATED ORAL at 12:53

## 2024-04-20 RX ADMIN — GABAPENTIN 100 MILLIGRAM(S): 400 CAPSULE ORAL at 14:00

## 2024-04-20 RX ADMIN — Medication 100 MILLIGRAM(S): at 14:48

## 2024-04-20 RX ADMIN — HEPARIN SODIUM 5000 UNIT(S): 5000 INJECTION INTRAVENOUS; SUBCUTANEOUS at 14:00

## 2024-04-20 RX ADMIN — CHLORHEXIDINE GLUCONATE 1 APPLICATION(S): 213 SOLUTION TOPICAL at 17:06

## 2024-04-20 RX ADMIN — Medication 500 MILLIGRAM(S): at 17:02

## 2024-04-20 RX ADMIN — GABAPENTIN 100 MILLIGRAM(S): 400 CAPSULE ORAL at 06:25

## 2024-04-20 RX ADMIN — Medication 4 MILLIGRAM(S): at 17:02

## 2024-04-20 RX ADMIN — PANTOPRAZOLE SODIUM 40 MILLIGRAM(S): 20 TABLET, DELAYED RELEASE ORAL at 12:54

## 2024-04-20 RX ADMIN — Medication 3 MILLILITER(S): at 08:25

## 2024-04-20 RX ADMIN — Medication 40 MILLIGRAM(S): at 08:45

## 2024-04-20 RX ADMIN — BUDESONIDE AND FORMOTEROL FUMARATE DIHYDRATE 2 PUFF(S): 160; 4.5 AEROSOL RESPIRATORY (INHALATION) at 08:42

## 2024-04-20 RX ADMIN — Medication 975 MILLIGRAM(S): at 06:25

## 2024-04-20 NOTE — CONSULT NOTE ADULT - SUBJECTIVE AND OBJECTIVE BOX
Patient is a 84y old  Male who presents with a chief complaint of Transfer from [facility] [place] for CABG eval (20 Apr 2024 17:56)    HPI:  84M with hx of CAD, HLD, HTN, asthma, COPD, former smoker (4.5 cigars/daily, ~ 50years, quit 2018), Statin intolerance, CKD3 (baseline sCr 1.4), PNA/Empyema, pericarditis and possible adrenal tumor/ pheochromocytoma with progressively worsening dyspnea and lower extremity edema underwent CTA and found to have elevated calcium score and underwent LHC demonstrating LM and severe TVD. Pt underwent CABG 4/18. Pt was extubated 4/19 initially to nasal cannula but required escalation to high flow.        PAST MEDICAL & SURGICAL HISTORY:  CAD (coronary artery disease)      HLD (hyperlipidemia)      H/O secondary hypertension      Asthma      History of COPD      Former smoker      Statin intolerance      Stage 3 chronic kidney disease      PNA (pneumonia)      Empyema    Medications:    DOBUTamine Infusion 3 MICROgram(s)/kG/Min IV Continuous <Continuous>  furosemide   Injectable 40 milliGRAM(s) IV Push two times a day    albuterol/ipratropium for Nebulization 3 milliLiter(s) Nebulizer every 6 hours  albuterol/ipratropium for Nebulization 3 milliLiter(s) Nebulizer every 6 hours PRN  budesonide  80 MICROgram(s)/formoterol 4.5 MICROgram(s) Inhaler 2 Puff(s) Inhalation two times a day    acetaminophen     Tablet .. 975 milliGRAM(s) Oral every 6 hours  gabapentin 100 milliGRAM(s) Oral every 8 hours  melatonin 5 milliGRAM(s) Oral at bedtime  oxyCODONE    IR 10 milliGRAM(s) Oral every 4 hours PRN  oxyCODONE    IR 5 milliGRAM(s) Oral every 4 hours PRN      aspirin enteric coated 325 milliGRAM(s) Oral daily  clopidogrel Tablet 75 milliGRAM(s) Oral daily  heparin   Injectable 5000 Unit(s) SubCutaneous every 8 hours    bisacodyl Suppository 10 milliGRAM(s) Rectal once  pantoprazole    Tablet 40 milliGRAM(s) Oral daily  polyethylene glycol 3350 17 Gram(s) Oral daily  senna 2 Tablet(s) Oral at bedtime      dexAMETHasone  Injectable 4 milliGRAM(s) IV Push every 6 hours  insulin lispro (ADMELOG) corrective regimen sliding scale   SubCutaneous Before meals and at bedtime    ascorbic acid 500 milliGRAM(s) Oral two times a day  sodium chloride 0.9%. 1000 milliLiter(s) IV Continuous <Continuous>  sodium chloride 0.9%. 1000 milliLiter(s) IV Continuous <Continuous>      chlorhexidine 2% Cloths 1 Application(s) Topical two times a day            ICU Vital Signs Last 24 Hrs  T(C): 37 (20 Apr 2024 16:00), Max: 38.2 (19 Apr 2024 23:00)  T(F): 98.6 (20 Apr 2024 16:00), Max: 100.8 (19 Apr 2024 23:00)  HR: 71 (20 Apr 2024 19:00) (63 - 93)  BP: 138/75 (20 Apr 2024 19:00) (121/70 - 153/77)  BP(mean): 94 (20 Apr 2024 19:00) (80 - 98)  ABP: 104/81 (20 Apr 2024 06:00) (104/66 - 172/59)  ABP(mean): 90 (20 Apr 2024 06:00) (79 - 92)  RR: 29 (20 Apr 2024 19:00) (15 - 33)  SpO2: 95% (20 Apr 2024 19:00) (91% - 98%)    O2 Parameters below as of 20 Apr 2024 18:00  Patient On (Oxygen Delivery Method): nasal cannula  O2 Flow (L/min): 6          ABG - ( 19 Apr 2024 10:31 )  pH, Arterial: 7.440 pH, Blood: x     /  pCO2: 38    /  pO2: 58    / HCO3: 26    / Base Excess: 1.6   /  SaO2: 93.6                I&O's Detail    19 Apr 2024 07:01  -  20 Apr 2024 07:00  --------------------------------------------------------  IN:    Albumin 5%  - 250 mL: 250 mL    DOBUTamine: 179.4 mL    Insulin: 3 mL    IV PiggyBack: 100 mL    IV PiggyBack: 250 mL    IV PiggyBack: 100 mL    Norepinephrine: 2.9 mL    Oral Fluid: 555 mL    sodium chloride 0.9%: 120 mL    sodium chloride 0.9%: 240 mL  Total IN: 1800.3 mL    OUT:    Chest Tube (mL): 490 mL    Chest Tube (mL): 320 mL    EPINEPHrine: 0 mL    Indwelling Catheter - Urethral (mL): 940 mL    Vasopressin: 0 mL  Total OUT: 1750 mL    Total NET: 50.3 mL      20 Apr 2024 07:01  -  20 Apr 2024 19:50  --------------------------------------------------------  IN:    DOBUTamine: 93.6 mL    IV PiggyBack: 250 mL    Oral Fluid: 600 mL    sodium chloride 0.9%: 60 mL  Total IN: 1003.6 mL    OUT:    Chest Tube (mL): 55 mL    Chest Tube (mL): 70 mL    Indwelling Catheter - Urethral (mL): 1975 mL  Total OUT: 2100 mL    Total NET: -1096.4 mL            LABS:                        10.7   14.21 )-----------( 101      ( 20 Apr 2024 02:00 )             30.3     04-20    136  |  99  |  43.1<H>  ----------------------------<  167<H>  4.7   |  24.0  |  1.72<H>    Ca    8.2<L>      20 Apr 2024 11:55  Mg     2.1     04-20    TPro  4.7<L>  /  Alb  3.2<L>  /  TBili  1.6  /  DBili  x   /  AST  66<H>  /  ALT  25  /  AlkPhos  46  04-19      CARDIAC MARKERS ( 19 Apr 2024 02:00 )  x     / x     / 475 U/L / x     / 45.8 ng/mL      CAPILLARY BLOOD GLUCOSE      POCT Blood Glucose.: 176 mg/dL (20 Apr 2024 16:37)    PT/INR - ( 19 Apr 2024 02:00 )   PT: 13.1 sec;   INR: 1.19 ratio         PTT - ( 19 Apr 2024 02:00 )  PTT:33.4 sec  Urinalysis Basic - ( 20 Apr 2024 11:55 )    Color: x / Appearance: x / SG: x / pH: x  Gluc: 167 mg/dL / Ketone: x  / Bili: x / Urobili: x   Blood: x / Protein: x / Nitrite: x   Leuk Esterase: x / RBC: x / WBC x   Sq Epi: x / Non Sq Epi: x / Bacteria: x      CULTURES:      Physical Examination:    General: No acute distress.      HEENT: Pupils equal, reactive to light.  Symmetric.    PULM: Clear to auscultation bilaterally, no significant sputum production    NECK: Supple, no lymphadenopathy, trachea midline    CVS: Regular rate and rhythm, no murmurs, rubs, or gallops    ABD: Soft, nondistended, nontender, normoactive bowel sounds, no masses    EXT: No edema, nontender    SKIN: Warm and well perfused, no rashes noted.    NEURO: Alert, oriented, interactive, nonfocal    DEVICES:     RADIOLOGY: ***   Patient is a 84y old  Male who [place] a [place] [facility] [place] (20 Apr 2024 17:56)    HPI:  84M with hx of CAD, HLD, HTN, asthma, COPD, former smoker (4.5 cigars/daily, ~ 50years, quit 2018), Statin intolerance, CKD3 (baseline sCr 1.4), PNA/Empyema, pericarditis and possible adrenal tumor/ pheochromocytoma with progressively worsening dyspnea and lower extremity edema underwent CTA and found to have elevated calcium score and underwent LHC demonstrating LM and severe TVD. Pt underwent CABG 4/18. Pt was extubated 4/19 initially to nasal cannula but required escalation to high flow. Now weaned to 40% 40L.   Pt typically follows with Dr. Solorio and maintained on Breo and albuterol prn. Pt denies any respiratory complaint prior to presentation. Denies any recent fevers/chills. Notes cough with whitish sputum. Pt intermittently spiking temperatures yesterday. No chest pain. Denies wheezing. No nausea/vomiting/abdominal pain. Chest tubes remain in place with decreased output. Pt denies any current subjective dyspnea. Denies LE pain. Reports he has rare episodes of dysphagia but denies any issues while here.        PAST MEDICAL & SURGICAL HISTORY:  CAD (coronary artery disease)      HLD (hyperlipidemia)      H/O secondary hypertension      Asthma      History of COPD      Former smoker      Statin intolerance      Stage 3 chronic kidney disease      PNA (pneumonia)      Empyema    Medications:    DOBUTamine Infusion 3 MICROgram(s)/kG/Min IV Continuous <Continuous>  furosemide   Injectable 40 milliGRAM(s) IV Push two times a day    albuterol/ipratropium for Nebulization 3 milliLiter(s) Nebulizer every 6 hours  albuterol/ipratropium for Nebulization 3 milliLiter(s) Nebulizer every 6 hours PRN  budesonide  80 MICROgram(s)/formoterol 4.5 MICROgram(s) Inhaler 2 Puff(s) Inhalation two times a day    acetaminophen     Tablet .. 975 milliGRAM(s) Oral every 6 hours  gabapentin 100 milliGRAM(s) Oral every 8 hours  melatonin 5 milliGRAM(s) Oral at bedtime  oxyCODONE    IR 10 milliGRAM(s) Oral every 4 hours PRN  oxyCODONE    IR 5 milliGRAM(s) Oral every 4 hours PRN      aspirin enteric coated 325 milliGRAM(s) Oral daily  clopidogrel Tablet 75 milliGRAM(s) Oral daily  heparin   Injectable 5000 Unit(s) SubCutaneous every 8 hours    bisacodyl Suppository 10 milliGRAM(s) Rectal once  pantoprazole    Tablet 40 milliGRAM(s) Oral daily  polyethylene glycol 3350 17 Gram(s) Oral daily  senna 2 Tablet(s) Oral at bedtime      dexAMETHasone  Injectable 4 milliGRAM(s) IV Push every 6 hours  insulin lispro (ADMELOG) corrective regimen sliding scale   SubCutaneous Before meals and at bedtime    ascorbic acid 500 milliGRAM(s) Oral two times a day  sodium chloride 0.9%. 1000 milliLiter(s) IV Continuous <Continuous>  sodium chloride 0.9%. 1000 milliLiter(s) IV Continuous <Continuous>      chlorhexidine 2% Cloths 1 Application(s) Topical two times a day      Allergies: Allergies    Levaquin (Unknown)  ampicillin (Unknown)  penicillin (Unknown)    Intolerances    Lipitor (Unknown)    Social: Social History:  Former cigar use (4-5/day approx 50years, quit in 2018)  No etoh or illicit substance use  Independent of ADLs  No functional decline  No use of assistive devices (17 Apr 2024 18:30)    FH: denies family hx of pulmonary disease    ICU Vital Signs Last 24 Hrs  T(C): 37 (20 Apr 2024 16:00), Max: 38.2 (19 Apr 2024 23:00)  T(F): 98.6 (20 Apr 2024 16:00), Max: 100.8 (19 Apr 2024 23:00)  HR: 71 (20 Apr 2024 19:00) (63 - 93)  BP: 138/75 (20 Apr 2024 19:00) (121/70 - 153/77)  BP(mean): 94 (20 Apr 2024 19:00) (80 - 98)  ABP: 104/81 (20 Apr 2024 06:00) (104/66 - 172/59)  ABP(mean): 90 (20 Apr 2024 06:00) (79 - 92)  RR: 29 (20 Apr 2024 19:00) (15 - 33)  SpO2: 95% (20 Apr 2024 19:00) (91% - 98%)    O2 Parameters below as of 20 Apr 2024 18:00  Patient On (Oxygen Delivery Method): nasal cannula  O2 Flow (L/min): 6          ABG - ( 19 Apr 2024 10:31 )  pH, Arterial: 7.440 pH, Blood: x     /  pCO2: 38    /  pO2: 58    / HCO3: 26    / Base Excess: 1.6   /  SaO2: 93.6                I&O's Detail    19 Apr 2024 07:01  -  20 Apr 2024 07:00  --------------------------------------------------------  IN:    Albumin 5%  - 250 mL: 250 mL    DOBUTamine: 179.4 mL    Insulin: 3 mL    IV PiggyBack: 100 mL    IV PiggyBack: 250 mL    IV PiggyBack: 100 mL    Norepinephrine: 2.9 mL    Oral Fluid: 555 mL    sodium chloride 0.9%: 120 mL    sodium chloride 0.9%: 240 mL  Total IN: 1800.3 mL    OUT:    Chest Tube (mL): 490 mL    Chest Tube (mL): 320 mL    EPINEPHrine: 0 mL    Indwelling Catheter - Urethral (mL): 940 mL    Vasopressin: 0 mL  Total OUT: 1750 mL    Total NET: 50.3 mL      20 Apr 2024 07:01  -  20 Apr 2024 19:50  --------------------------------------------------------  IN:    DOBUTamine: 93.6 mL    IV PiggyBack: 250 mL    Oral Fluid: 600 mL    sodium chloride 0.9%: 60 mL  Total IN: 1003.6 mL    OUT:    Chest Tube (mL): 55 mL    Chest Tube (mL): 70 mL    Indwelling Catheter - Urethral (mL): 1975 mL  Total OUT: 2100 mL    Total NET: -1096.4 mL            LABS:                        10.7   14.21 )-----------( 101      ( 20 Apr 2024 02:00 )             30.3     04-20    136  |  99  |  43.1<H>  ----------------------------<  167<H>  4.7   |  24.0  |  1.72<H>    Ca    8.2<L>      20 Apr 2024 11:55  Mg     2.1     04-20    TPro  4.7<L>  /  Alb  3.2<L>  /  TBili  1.6  /  DBili  x   /  AST  66<H>  /  ALT  25  /  AlkPhos  46  04-19      CARDIAC MARKERS ( 19 Apr 2024 02:00 )  x     / x     / 475 U/L / x     / 45.8 ng/mL      CAPILLARY BLOOD GLUCOSE      POCT Blood Glucose.: 176 mg/dL (20 Apr 2024 16:37)    PT/INR - ( 19 Apr 2024 02:00 )   PT: 13.1 sec;   INR: 1.19 ratio         PTT - ( 19 Apr 2024 02:00 )  PTT:33.4 sec  Urinalysis Basic - ( 20 Apr 2024 11:55 )    Color: x / Appearance: x / SG: x / pH: x  Gluc: 167 mg/dL / Ketone: x  / Bili: x / Urobili: x   Blood: x / Protein: x / Nitrite: x   Leuk Esterase: x / RBC: x / WBC x   Sq Epi: x / Non Sq Epi: x / Bacteria: x      CULTURES:      Physical Examination:    General: awake, alert, in NAD       HEENT: NC/AT, anicteric, MMM    PULM: diminished throughout the left and diminished R base, left/mediastinal chest tube with no air leak      NECK: Supple, trachea midline    CVS: S1S2. RRR    ABD: Soft, nondistended, nontender, normoactive bowel sounds    EXT: trace edema b/l, nontender, RLE in ace wrap c/d/i    SKIN: Warm and well perfused, no rashes noted.    NEURO: Alert, oriented, interactive, nonfocal    ROS: negative except as per HPI    RADIOLOGY:   < from: CT Chest No Cont (04.17.24 @ 19:31) >  FINDINGS:    LUNGS/AIRWAYS/PLEURA: Small volume secretions in the trachea. Emphysema.   New left upper lobe 5 mm solid nodule (3-32). Increased size of 1.6 cm   right apical nodular opacity (3-17), prior 1.2 cm. Unchanged 0.7 cm   groundglass nodule in the apical left upper lobe. Linear atelectasis in   the lingula. No pleural effusion.    LYMPH NODES/MEDIASTINUM: No lymphadenopathy.    HEART/VASCULATURE: Multichamber cardiac enlargement. Multivessel coronary   artery calcified plaque. Calcified aortic valve. No pericardial effusion.   Approximate 4 cm ascending aorta at the level of the right pulmonary   artery.    UPPER ABDOMEN: Bilateral renal cysts. Hyper dense material within both   ureters.    BONES/SOFT TISSUES: Degenerative changes of the spine. Multiple old rib   fractures. Unchanged multiple compression fractures.        IMPRESSION:    Since 8/3/2019:    New left upper lobe 5 mm solidnodule, and mild increased size of a 1.6   cm nodular opacity in the right upper lobe. These are indeterminate.   Recommend surveillance CT chest in 6 months.    Emphysema.    < end of copied text >

## 2024-04-20 NOTE — CONSULT NOTE ADULT - ASSESSMENT
A) CRF, AS, CAD, COPD  with enlarging lung  nodules on CT.    P) OR  today, renal  sono, urine  studies, follow up labs. serology.
84M with hx of CAD, HLD, HTN, asthma, COPD, former smoker (4.5 cigars/daily, ~ 50years, quit 2018), Statin intolerance, CKD3 (baseline sCr 1.4), PNA/Empyema, pericarditis and possible adrenal tumor/ pheochromocytoma with progressively worsening dyspnea and lower extremity edema found to have TVD and underwent CABG 4/18 with post operative course complicated by hypoxic respiratory failure requiring high flow       Post operative hypoxic respiratory failure   likely multifactorial secondary to volume overload due to multiple transfusion requirement intraop, underlying emphysema, atelectasis vs developing consolidation and possible effusion   c/w diuresis for net negative   ultrasound to assess for drainable effusion   check procal/RVP given recent fevers   check MRSA pcr   check blood cx   low threshold to start empiric abx given progressive infiltrates and recent fever and now receiving Tylenol   can obtain repeat CT chest   continue with duoneb/symbicort   no significant bronchospasm currently but given improvement post initiation of steroids, can continue for now   incentive spirometry   aspiration precautions   chest tubes per CT surgery   f/u w/ Dr. Solorio outpatient on discharge       
-COPD, moderate. At baseline. No A/E. Denies any chronic ROJO  -Emphysema  -Lung nodules; has been having f/u CTs as outpt at another facility; last one 9/2023 and was stable  -Former smoker  -CAD; for CABG  -No hypercarbia  -Not on home O2  -Preop eval    Pt at above average risk for respiratory complications given the copd, emphysema, bronchiectasis hx. He appears optimized.    No pulmonary contraindications to planned CABG.     RECC:  Would do duoneb TID ATC now and after surgery. Can be used prn. IS postop. Follow O2. Will need continued outpt f/u.

## 2024-04-21 LAB
ANION GAP SERPL CALC-SCNC: 11 MMOL/L — SIGNIFICANT CHANGE UP (ref 5–17)
ANION GAP SERPL CALC-SCNC: 20 MMOL/L — HIGH (ref 5–17)
BUN SERPL-MCNC: 54.6 MG/DL — HIGH (ref 8–20)
BUN SERPL-MCNC: 57.3 MG/DL — HIGH (ref 8–20)
CALCIUM SERPL-MCNC: 7.9 MG/DL — LOW (ref 8.4–10.5)
CALCIUM SERPL-MCNC: 8 MG/DL — LOW (ref 8.4–10.5)
CHLORIDE SERPL-SCNC: 100 MMOL/L — SIGNIFICANT CHANGE UP (ref 96–108)
CHLORIDE SERPL-SCNC: 96 MMOL/L — SIGNIFICANT CHANGE UP (ref 96–108)
CO2 SERPL-SCNC: 19 MMOL/L — LOW (ref 22–29)
CO2 SERPL-SCNC: 26 MMOL/L — SIGNIFICANT CHANGE UP (ref 22–29)
CREAT SERPL-MCNC: 1.88 MG/DL — HIGH (ref 0.5–1.3)
CREAT SERPL-MCNC: 1.93 MG/DL — HIGH (ref 0.5–1.3)
EGFR: 34 ML/MIN/1.73M2 — LOW
EGFR: 35 ML/MIN/1.73M2 — LOW
GLUCOSE BLDC GLUCOMTR-MCNC: 145 MG/DL — HIGH (ref 70–99)
GLUCOSE BLDC GLUCOMTR-MCNC: 163 MG/DL — HIGH (ref 70–99)
GLUCOSE BLDC GLUCOMTR-MCNC: 166 MG/DL — HIGH (ref 70–99)
GLUCOSE BLDC GLUCOMTR-MCNC: 180 MG/DL — HIGH (ref 70–99)
GLUCOSE SERPL-MCNC: 126 MG/DL — HIGH (ref 70–99)
GLUCOSE SERPL-MCNC: 135 MG/DL — HIGH (ref 70–99)
HCT VFR BLD CALC: 31.9 % — LOW (ref 39–50)
HGB BLD-MCNC: 11 G/DL — LOW (ref 13–17)
MAGNESIUM SERPL-MCNC: 2.3 MG/DL — SIGNIFICANT CHANGE UP (ref 1.6–2.6)
MAGNESIUM SERPL-MCNC: 2.6 MG/DL — SIGNIFICANT CHANGE UP (ref 1.6–2.6)
MCHC RBC-ENTMCNC: 30.9 PG — SIGNIFICANT CHANGE UP (ref 27–34)
MCHC RBC-ENTMCNC: 34.5 GM/DL — SIGNIFICANT CHANGE UP (ref 32–36)
MCV RBC AUTO: 89.6 FL — SIGNIFICANT CHANGE UP (ref 80–100)
PLATELET # BLD AUTO: 83 K/UL — LOW (ref 150–400)
POTASSIUM SERPL-MCNC: 4.6 MMOL/L — SIGNIFICANT CHANGE UP (ref 3.5–5.3)
POTASSIUM SERPL-MCNC: 4.7 MMOL/L — SIGNIFICANT CHANGE UP (ref 3.5–5.3)
POTASSIUM SERPL-SCNC: 4.6 MMOL/L — SIGNIFICANT CHANGE UP (ref 3.5–5.3)
POTASSIUM SERPL-SCNC: 4.7 MMOL/L — SIGNIFICANT CHANGE UP (ref 3.5–5.3)
RBC # BLD: 3.56 M/UL — LOW (ref 4.2–5.8)
RBC # FLD: 16 % — HIGH (ref 10.3–14.5)
SODIUM SERPL-SCNC: 134 MMOL/L — LOW (ref 135–145)
SODIUM SERPL-SCNC: 137 MMOL/L — SIGNIFICANT CHANGE UP (ref 135–145)
WBC # BLD: 14.08 K/UL — HIGH (ref 3.8–10.5)
WBC # FLD AUTO: 14.08 K/UL — HIGH (ref 3.8–10.5)

## 2024-04-21 PROCEDURE — 93010 ELECTROCARDIOGRAM REPORT: CPT

## 2024-04-21 PROCEDURE — 99232 SBSQ HOSP IP/OBS MODERATE 35: CPT

## 2024-04-21 PROCEDURE — 99291 CRITICAL CARE FIRST HOUR: CPT

## 2024-04-21 PROCEDURE — 71045 X-RAY EXAM CHEST 1 VIEW: CPT | Mod: 26

## 2024-04-21 RX ORDER — METOPROLOL TARTRATE 50 MG
12.5 TABLET ORAL
Refills: 0 | Status: DISCONTINUED | OUTPATIENT
Start: 2024-04-21 | End: 2024-04-25

## 2024-04-21 RX ADMIN — Medication 3 MILLILITER(S): at 02:07

## 2024-04-21 RX ADMIN — CHLORHEXIDINE GLUCONATE 1 APPLICATION(S): 213 SOLUTION TOPICAL at 18:41

## 2024-04-21 RX ADMIN — GABAPENTIN 100 MILLIGRAM(S): 400 CAPSULE ORAL at 22:27

## 2024-04-21 RX ADMIN — Medication 975 MILLIGRAM(S): at 07:22

## 2024-04-21 RX ADMIN — SENNA PLUS 2 TABLET(S): 8.6 TABLET ORAL at 22:26

## 2024-04-21 RX ADMIN — BUDESONIDE AND FORMOTEROL FUMARATE DIHYDRATE 2 PUFF(S): 160; 4.5 AEROSOL RESPIRATORY (INHALATION) at 20:38

## 2024-04-21 RX ADMIN — GABAPENTIN 100 MILLIGRAM(S): 400 CAPSULE ORAL at 18:13

## 2024-04-21 RX ADMIN — Medication 325 MILLIGRAM(S): at 11:07

## 2024-04-21 RX ADMIN — Medication 975 MILLIGRAM(S): at 11:08

## 2024-04-21 RX ADMIN — Medication 4 MILLIGRAM(S): at 11:08

## 2024-04-21 RX ADMIN — Medication 500 MILLIGRAM(S): at 11:52

## 2024-04-21 RX ADMIN — Medication 500 MILLIGRAM(S): at 18:14

## 2024-04-21 RX ADMIN — Medication 3 MILLILITER(S): at 14:48

## 2024-04-21 RX ADMIN — Medication 4 MILLIGRAM(S): at 06:00

## 2024-04-21 RX ADMIN — Medication 975 MILLIGRAM(S): at 06:52

## 2024-04-21 RX ADMIN — Medication 975 MILLIGRAM(S): at 18:46

## 2024-04-21 RX ADMIN — Medication 3 MILLILITER(S): at 20:15

## 2024-04-21 RX ADMIN — HEPARIN SODIUM 5000 UNIT(S): 5000 INJECTION INTRAVENOUS; SUBCUTANEOUS at 06:52

## 2024-04-21 RX ADMIN — CLOPIDOGREL BISULFATE 75 MILLIGRAM(S): 75 TABLET, FILM COATED ORAL at 11:07

## 2024-04-21 RX ADMIN — Medication 12.5 MILLIGRAM(S): at 19:45

## 2024-04-21 RX ADMIN — Medication 4 MILLIGRAM(S): at 00:53

## 2024-04-21 RX ADMIN — Medication 2: at 16:45

## 2024-04-21 RX ADMIN — Medication 975 MILLIGRAM(S): at 18:14

## 2024-04-21 RX ADMIN — Medication 3 MILLILITER(S): at 08:32

## 2024-04-21 RX ADMIN — Medication 5 MILLIGRAM(S): at 22:27

## 2024-04-21 RX ADMIN — GABAPENTIN 100 MILLIGRAM(S): 400 CAPSULE ORAL at 06:53

## 2024-04-21 RX ADMIN — Medication 40 MILLIGRAM(S): at 06:52

## 2024-04-21 RX ADMIN — POLYETHYLENE GLYCOL 3350 17 GRAM(S): 17 POWDER, FOR SOLUTION ORAL at 11:08

## 2024-04-21 RX ADMIN — Medication 975 MILLIGRAM(S): at 11:38

## 2024-04-21 RX ADMIN — BUDESONIDE AND FORMOTEROL FUMARATE DIHYDRATE 2 PUFF(S): 160; 4.5 AEROSOL RESPIRATORY (INHALATION) at 08:32

## 2024-04-21 RX ADMIN — Medication 2: at 22:34

## 2024-04-21 RX ADMIN — PANTOPRAZOLE SODIUM 40 MILLIGRAM(S): 20 TABLET, DELAYED RELEASE ORAL at 11:07

## 2024-04-21 RX ADMIN — Medication 2: at 11:15

## 2024-04-22 LAB
ANION GAP SERPL CALC-SCNC: 10 MMOL/L — SIGNIFICANT CHANGE UP (ref 5–17)
BUN SERPL-MCNC: 54.1 MG/DL — HIGH (ref 8–20)
CALCIUM SERPL-MCNC: 8 MG/DL — LOW (ref 8.4–10.5)
CHLORIDE SERPL-SCNC: 100 MMOL/L — SIGNIFICANT CHANGE UP (ref 96–108)
CO2 SERPL-SCNC: 26 MMOL/L — SIGNIFICANT CHANGE UP (ref 22–29)
CREAT SERPL-MCNC: 1.58 MG/DL — HIGH (ref 0.5–1.3)
EGFR: 43 ML/MIN/1.73M2 — LOW
GLUCOSE BLDC GLUCOMTR-MCNC: 120 MG/DL — HIGH (ref 70–99)
GLUCOSE BLDC GLUCOMTR-MCNC: 125 MG/DL — HIGH (ref 70–99)
GLUCOSE BLDC GLUCOMTR-MCNC: 135 MG/DL — HIGH (ref 70–99)
GLUCOSE BLDC GLUCOMTR-MCNC: 136 MG/DL — HIGH (ref 70–99)
GLUCOSE SERPL-MCNC: 125 MG/DL — HIGH (ref 70–99)
HCT VFR BLD CALC: 34.1 % — LOW (ref 39–50)
HGB BLD-MCNC: 11.8 G/DL — LOW (ref 13–17)
MAGNESIUM SERPL-MCNC: 2.4 MG/DL — SIGNIFICANT CHANGE UP (ref 1.6–2.6)
MCHC RBC-ENTMCNC: 31.5 PG — SIGNIFICANT CHANGE UP (ref 27–34)
MCHC RBC-ENTMCNC: 34.6 GM/DL — SIGNIFICANT CHANGE UP (ref 32–36)
MCV RBC AUTO: 90.9 FL — SIGNIFICANT CHANGE UP (ref 80–100)
PLATELET # BLD AUTO: 89 K/UL — LOW (ref 150–400)
POTASSIUM SERPL-MCNC: 4.2 MMOL/L — SIGNIFICANT CHANGE UP (ref 3.5–5.3)
POTASSIUM SERPL-SCNC: 4.2 MMOL/L — SIGNIFICANT CHANGE UP (ref 3.5–5.3)
RBC # BLD: 3.75 M/UL — LOW (ref 4.2–5.8)
RBC # FLD: 15.6 % — HIGH (ref 10.3–14.5)
SODIUM SERPL-SCNC: 136 MMOL/L — SIGNIFICANT CHANGE UP (ref 135–145)
WBC # BLD: 15.79 K/UL — HIGH (ref 3.8–10.5)
WBC # FLD AUTO: 15.79 K/UL — HIGH (ref 3.8–10.5)

## 2024-04-22 PROCEDURE — 99024 POSTOP FOLLOW-UP VISIT: CPT

## 2024-04-22 PROCEDURE — 99291 CRITICAL CARE FIRST HOUR: CPT

## 2024-04-22 PROCEDURE — 71045 X-RAY EXAM CHEST 1 VIEW: CPT | Mod: 26

## 2024-04-22 PROCEDURE — 99232 SBSQ HOSP IP/OBS MODERATE 35: CPT

## 2024-04-22 RX ORDER — LACTULOSE 10 G/15ML
20 SOLUTION ORAL ONCE
Refills: 0 | Status: COMPLETED | OUTPATIENT
Start: 2024-04-22 | End: 2024-04-22

## 2024-04-22 RX ADMIN — LACTULOSE 20 GRAM(S): 10 SOLUTION ORAL at 12:48

## 2024-04-22 RX ADMIN — Medication 3 MILLILITER(S): at 19:56

## 2024-04-22 RX ADMIN — GABAPENTIN 100 MILLIGRAM(S): 400 CAPSULE ORAL at 06:24

## 2024-04-22 RX ADMIN — GABAPENTIN 100 MILLIGRAM(S): 400 CAPSULE ORAL at 22:05

## 2024-04-22 RX ADMIN — BUDESONIDE AND FORMOTEROL FUMARATE DIHYDRATE 2 PUFF(S): 160; 4.5 AEROSOL RESPIRATORY (INHALATION) at 19:56

## 2024-04-22 RX ADMIN — SENNA PLUS 2 TABLET(S): 8.6 TABLET ORAL at 22:05

## 2024-04-22 RX ADMIN — PANTOPRAZOLE SODIUM 40 MILLIGRAM(S): 20 TABLET, DELAYED RELEASE ORAL at 11:24

## 2024-04-22 RX ADMIN — BUDESONIDE AND FORMOTEROL FUMARATE DIHYDRATE 2 PUFF(S): 160; 4.5 AEROSOL RESPIRATORY (INHALATION) at 08:34

## 2024-04-22 RX ADMIN — CHLORHEXIDINE GLUCONATE 1 APPLICATION(S): 213 SOLUTION TOPICAL at 06:27

## 2024-04-22 RX ADMIN — CHLORHEXIDINE GLUCONATE 1 APPLICATION(S): 213 SOLUTION TOPICAL at 17:49

## 2024-04-22 RX ADMIN — Medication 500 MILLIGRAM(S): at 17:46

## 2024-04-22 RX ADMIN — Medication 3 MILLILITER(S): at 14:27

## 2024-04-22 RX ADMIN — Medication 325 MILLIGRAM(S): at 11:24

## 2024-04-22 RX ADMIN — Medication 5 MILLIGRAM(S): at 22:05

## 2024-04-22 RX ADMIN — POLYETHYLENE GLYCOL 3350 17 GRAM(S): 17 POWDER, FOR SOLUTION ORAL at 11:24

## 2024-04-22 RX ADMIN — Medication 500 MILLIGRAM(S): at 06:24

## 2024-04-22 RX ADMIN — Medication 12.5 MILLIGRAM(S): at 06:24

## 2024-04-22 RX ADMIN — CLOPIDOGREL BISULFATE 75 MILLIGRAM(S): 75 TABLET, FILM COATED ORAL at 11:25

## 2024-04-22 RX ADMIN — GABAPENTIN 100 MILLIGRAM(S): 400 CAPSULE ORAL at 13:52

## 2024-04-22 RX ADMIN — Medication 3 MILLILITER(S): at 02:03

## 2024-04-22 RX ADMIN — Medication 12.5 MILLIGRAM(S): at 17:46

## 2024-04-22 RX ADMIN — Medication 3 MILLILITER(S): at 08:34

## 2024-04-23 ENCOUNTER — APPOINTMENT (OUTPATIENT)
Dept: CARDIOLOGY | Facility: CLINIC | Age: 84
End: 2024-04-23

## 2024-04-23 LAB
ANION GAP SERPL CALC-SCNC: 11 MMOL/L — SIGNIFICANT CHANGE UP (ref 5–17)
BUN SERPL-MCNC: 49.8 MG/DL — HIGH (ref 8–20)
CALCIUM SERPL-MCNC: 8 MG/DL — LOW (ref 8.4–10.5)
CHLORIDE SERPL-SCNC: 101 MMOL/L — SIGNIFICANT CHANGE UP (ref 96–108)
CO2 SERPL-SCNC: 26 MMOL/L — SIGNIFICANT CHANGE UP (ref 22–29)
CREAT SERPL-MCNC: 1.21 MG/DL — SIGNIFICANT CHANGE UP (ref 0.5–1.3)
EGFR: 59 ML/MIN/1.73M2 — LOW
GLUCOSE BLDC GLUCOMTR-MCNC: 94 MG/DL — SIGNIFICANT CHANGE UP (ref 70–99)
GLUCOSE SERPL-MCNC: 97 MG/DL — SIGNIFICANT CHANGE UP (ref 70–99)
HCT VFR BLD CALC: 35 % — LOW (ref 39–50)
HGB BLD-MCNC: 11.7 G/DL — LOW (ref 13–17)
MAGNESIUM SERPL-MCNC: 2.3 MG/DL — SIGNIFICANT CHANGE UP (ref 1.6–2.6)
MCHC RBC-ENTMCNC: 31.5 PG — SIGNIFICANT CHANGE UP (ref 27–34)
MCHC RBC-ENTMCNC: 33.4 GM/DL — SIGNIFICANT CHANGE UP (ref 32–36)
MCV RBC AUTO: 94.1 FL — SIGNIFICANT CHANGE UP (ref 80–100)
PLATELET # BLD AUTO: 100 K/UL — LOW (ref 150–400)
POTASSIUM SERPL-MCNC: 4.8 MMOL/L — SIGNIFICANT CHANGE UP (ref 3.5–5.3)
POTASSIUM SERPL-SCNC: 4.8 MMOL/L — SIGNIFICANT CHANGE UP (ref 3.5–5.3)
RBC # BLD: 3.72 M/UL — LOW (ref 4.2–5.8)
RBC # FLD: 15.4 % — HIGH (ref 10.3–14.5)
SODIUM SERPL-SCNC: 138 MMOL/L — SIGNIFICANT CHANGE UP (ref 135–145)
WBC # BLD: 14.25 K/UL — HIGH (ref 3.8–10.5)
WBC # FLD AUTO: 14.25 K/UL — HIGH (ref 3.8–10.5)

## 2024-04-23 PROCEDURE — 99232 SBSQ HOSP IP/OBS MODERATE 35: CPT | Mod: FS,24

## 2024-04-23 PROCEDURE — 71045 X-RAY EXAM CHEST 1 VIEW: CPT | Mod: 26,77

## 2024-04-23 PROCEDURE — 71045 X-RAY EXAM CHEST 1 VIEW: CPT | Mod: 26

## 2024-04-23 PROCEDURE — 99291 CRITICAL CARE FIRST HOUR: CPT

## 2024-04-23 RX ORDER — LACTULOSE 10 G/15ML
10 SOLUTION ORAL EVERY 6 HOURS
Refills: 0 | Status: DISCONTINUED | OUTPATIENT
Start: 2024-04-23 | End: 2024-04-24

## 2024-04-23 RX ORDER — HEPARIN SODIUM 5000 [USP'U]/ML
5000 INJECTION INTRAVENOUS; SUBCUTANEOUS EVERY 8 HOURS
Refills: 0 | Status: DISCONTINUED | OUTPATIENT
Start: 2024-04-23 | End: 2024-04-26

## 2024-04-23 RX ADMIN — Medication 3 MILLILITER(S): at 08:25

## 2024-04-23 RX ADMIN — Medication 500 MILLIGRAM(S): at 05:14

## 2024-04-23 RX ADMIN — CLOPIDOGREL BISULFATE 75 MILLIGRAM(S): 75 TABLET, FILM COATED ORAL at 11:26

## 2024-04-23 RX ADMIN — BUDESONIDE AND FORMOTEROL FUMARATE DIHYDRATE 2 PUFF(S): 160; 4.5 AEROSOL RESPIRATORY (INHALATION) at 08:22

## 2024-04-23 RX ADMIN — Medication 325 MILLIGRAM(S): at 11:26

## 2024-04-23 RX ADMIN — GABAPENTIN 100 MILLIGRAM(S): 400 CAPSULE ORAL at 21:09

## 2024-04-23 RX ADMIN — GABAPENTIN 100 MILLIGRAM(S): 400 CAPSULE ORAL at 05:14

## 2024-04-23 RX ADMIN — Medication 500 MILLIGRAM(S): at 17:12

## 2024-04-23 RX ADMIN — BUDESONIDE AND FORMOTEROL FUMARATE DIHYDRATE 2 PUFF(S): 160; 4.5 AEROSOL RESPIRATORY (INHALATION) at 20:15

## 2024-04-23 RX ADMIN — PANTOPRAZOLE SODIUM 40 MILLIGRAM(S): 20 TABLET, DELAYED RELEASE ORAL at 11:26

## 2024-04-23 RX ADMIN — Medication 3 MILLILITER(S): at 03:25

## 2024-04-23 RX ADMIN — HEPARIN SODIUM 5000 UNIT(S): 5000 INJECTION INTRAVENOUS; SUBCUTANEOUS at 21:10

## 2024-04-23 RX ADMIN — SENNA PLUS 2 TABLET(S): 8.6 TABLET ORAL at 21:09

## 2024-04-23 RX ADMIN — Medication 10 MILLIGRAM(S): at 14:15

## 2024-04-23 RX ADMIN — Medication 12.5 MILLIGRAM(S): at 05:14

## 2024-04-23 RX ADMIN — Medication 12.5 MILLIGRAM(S): at 17:12

## 2024-04-23 RX ADMIN — Medication 5 MILLIGRAM(S): at 21:10

## 2024-04-23 RX ADMIN — POLYETHYLENE GLYCOL 3350 17 GRAM(S): 17 POWDER, FOR SOLUTION ORAL at 11:37

## 2024-04-23 RX ADMIN — GABAPENTIN 100 MILLIGRAM(S): 400 CAPSULE ORAL at 14:15

## 2024-04-23 RX ADMIN — LACTULOSE 10 GRAM(S): 10 SOLUTION ORAL at 11:26

## 2024-04-23 RX ADMIN — Medication 3 MILLILITER(S): at 14:38

## 2024-04-23 RX ADMIN — HEPARIN SODIUM 5000 UNIT(S): 5000 INJECTION INTRAVENOUS; SUBCUTANEOUS at 06:11

## 2024-04-23 RX ADMIN — CHLORHEXIDINE GLUCONATE 1 APPLICATION(S): 213 SOLUTION TOPICAL at 17:17

## 2024-04-23 RX ADMIN — Medication 3 MILLILITER(S): at 20:14

## 2024-04-24 ENCOUNTER — TRANSCRIPTION ENCOUNTER (OUTPATIENT)
Age: 84
End: 2024-04-24

## 2024-04-24 LAB
ANION GAP SERPL CALC-SCNC: 10 MMOL/L — SIGNIFICANT CHANGE UP (ref 5–17)
ANION GAP SERPL CALC-SCNC: 11 MMOL/L — SIGNIFICANT CHANGE UP (ref 5–17)
BUN SERPL-MCNC: 39.7 MG/DL — HIGH (ref 8–20)
BUN SERPL-MCNC: 43.7 MG/DL — HIGH (ref 8–20)
CALCIUM SERPL-MCNC: 7.8 MG/DL — LOW (ref 8.4–10.5)
CALCIUM SERPL-MCNC: 8.1 MG/DL — LOW (ref 8.4–10.5)
CHLORIDE SERPL-SCNC: 101 MMOL/L — SIGNIFICANT CHANGE UP (ref 96–108)
CHLORIDE SERPL-SCNC: 99 MMOL/L — SIGNIFICANT CHANGE UP (ref 96–108)
CO2 SERPL-SCNC: 25 MMOL/L — SIGNIFICANT CHANGE UP (ref 22–29)
CO2 SERPL-SCNC: 27 MMOL/L — SIGNIFICANT CHANGE UP (ref 22–29)
CREAT SERPL-MCNC: 1.12 MG/DL — SIGNIFICANT CHANGE UP (ref 0.5–1.3)
CREAT SERPL-MCNC: 1.22 MG/DL — SIGNIFICANT CHANGE UP (ref 0.5–1.3)
EGFR: 58 ML/MIN/1.73M2 — LOW
EGFR: 64 ML/MIN/1.73M2 — SIGNIFICANT CHANGE UP
GLUCOSE SERPL-MCNC: 123 MG/DL — HIGH (ref 70–99)
GLUCOSE SERPL-MCNC: 98 MG/DL — SIGNIFICANT CHANGE UP (ref 70–99)
HCT VFR BLD CALC: 36.1 % — LOW (ref 39–50)
HGB BLD-MCNC: 12 G/DL — LOW (ref 13–17)
MAGNESIUM SERPL-MCNC: 2.4 MG/DL — SIGNIFICANT CHANGE UP (ref 1.6–2.6)
MAGNESIUM SERPL-MCNC: 2.4 MG/DL — SIGNIFICANT CHANGE UP (ref 1.6–2.6)
MCHC RBC-ENTMCNC: 31.6 PG — SIGNIFICANT CHANGE UP (ref 27–34)
MCHC RBC-ENTMCNC: 33.2 GM/DL — SIGNIFICANT CHANGE UP (ref 32–36)
MCV RBC AUTO: 95 FL — SIGNIFICANT CHANGE UP (ref 80–100)
METANEPHRINE, PL: 66.9 PG/ML — SIGNIFICANT CHANGE UP (ref 0–88)
NORMETANEPHRINE, PL: 111.2 PG/ML — SIGNIFICANT CHANGE UP (ref 0–297.2)
PLATELET # BLD AUTO: 115 K/UL — LOW (ref 150–400)
POTASSIUM SERPL-MCNC: 4.1 MMOL/L — SIGNIFICANT CHANGE UP (ref 3.5–5.3)
POTASSIUM SERPL-MCNC: 4.6 MMOL/L — SIGNIFICANT CHANGE UP (ref 3.5–5.3)
POTASSIUM SERPL-SCNC: 4.1 MMOL/L — SIGNIFICANT CHANGE UP (ref 3.5–5.3)
POTASSIUM SERPL-SCNC: 4.6 MMOL/L — SIGNIFICANT CHANGE UP (ref 3.5–5.3)
RBC # BLD: 3.8 M/UL — LOW (ref 4.2–5.8)
RBC # FLD: 15.8 % — HIGH (ref 10.3–14.5)
SODIUM SERPL-SCNC: 136 MMOL/L — SIGNIFICANT CHANGE UP (ref 135–145)
SODIUM SERPL-SCNC: 137 MMOL/L — SIGNIFICANT CHANGE UP (ref 135–145)
WBC # BLD: 13.97 K/UL — HIGH (ref 3.8–10.5)
WBC # FLD AUTO: 13.97 K/UL — HIGH (ref 3.8–10.5)

## 2024-04-24 PROCEDURE — 71250 CT THORAX DX C-: CPT | Mod: 26

## 2024-04-24 PROCEDURE — 71045 X-RAY EXAM CHEST 1 VIEW: CPT | Mod: 26

## 2024-04-24 PROCEDURE — 99024 POSTOP FOLLOW-UP VISIT: CPT

## 2024-04-24 PROCEDURE — 99231 SBSQ HOSP IP/OBS SF/LOW 25: CPT

## 2024-04-24 PROCEDURE — 99291 CRITICAL CARE FIRST HOUR: CPT

## 2024-04-24 RX ORDER — BUDESONIDE AND FORMOTEROL FUMARATE DIHYDRATE 160; 4.5 UG/1; UG/1
2 AEROSOL RESPIRATORY (INHALATION)
Refills: 0 | Status: DISCONTINUED | OUTPATIENT
Start: 2024-04-24 | End: 2024-04-28

## 2024-04-24 RX ORDER — BUMETANIDE 0.25 MG/ML
2 INJECTION INTRAMUSCULAR; INTRAVENOUS ONCE
Refills: 0 | Status: COMPLETED | OUTPATIENT
Start: 2024-04-24 | End: 2024-04-24

## 2024-04-24 RX ADMIN — BUDESONIDE AND FORMOTEROL FUMARATE DIHYDRATE 2 PUFF(S): 160; 4.5 AEROSOL RESPIRATORY (INHALATION) at 21:23

## 2024-04-24 RX ADMIN — Medication 3 MILLILITER(S): at 21:21

## 2024-04-24 RX ADMIN — HEPARIN SODIUM 5000 UNIT(S): 5000 INJECTION INTRAVENOUS; SUBCUTANEOUS at 21:53

## 2024-04-24 RX ADMIN — HEPARIN SODIUM 5000 UNIT(S): 5000 INJECTION INTRAVENOUS; SUBCUTANEOUS at 06:26

## 2024-04-24 RX ADMIN — SENNA PLUS 2 TABLET(S): 8.6 TABLET ORAL at 21:52

## 2024-04-24 RX ADMIN — CHLORHEXIDINE GLUCONATE 1 APPLICATION(S): 213 SOLUTION TOPICAL at 05:59

## 2024-04-24 RX ADMIN — Medication 3 MILLILITER(S): at 13:40

## 2024-04-24 RX ADMIN — BUDESONIDE AND FORMOTEROL FUMARATE DIHYDRATE 2 PUFF(S): 160; 4.5 AEROSOL RESPIRATORY (INHALATION) at 08:33

## 2024-04-24 RX ADMIN — CLOPIDOGREL BISULFATE 75 MILLIGRAM(S): 75 TABLET, FILM COATED ORAL at 11:56

## 2024-04-24 RX ADMIN — HEPARIN SODIUM 5000 UNIT(S): 5000 INJECTION INTRAVENOUS; SUBCUTANEOUS at 13:44

## 2024-04-24 RX ADMIN — BUMETANIDE 2 MILLIGRAM(S): 0.25 INJECTION INTRAMUSCULAR; INTRAVENOUS at 11:51

## 2024-04-24 RX ADMIN — Medication 12.5 MILLIGRAM(S): at 06:26

## 2024-04-24 RX ADMIN — PANTOPRAZOLE SODIUM 40 MILLIGRAM(S): 20 TABLET, DELAYED RELEASE ORAL at 11:56

## 2024-04-24 RX ADMIN — Medication 5 MILLIGRAM(S): at 21:53

## 2024-04-24 RX ADMIN — Medication 325 MILLIGRAM(S): at 11:56

## 2024-04-24 RX ADMIN — Medication 12.5 MILLIGRAM(S): at 18:05

## 2024-04-24 RX ADMIN — Medication 3 MILLILITER(S): at 08:33

## 2024-04-25 LAB
ANION GAP SERPL CALC-SCNC: 13 MMOL/L — SIGNIFICANT CHANGE UP (ref 5–17)
BUN SERPL-MCNC: 42.3 MG/DL — HIGH (ref 8–20)
CALCIUM SERPL-MCNC: 7.7 MG/DL — LOW (ref 8.4–10.5)
CHLORIDE SERPL-SCNC: 99 MMOL/L — SIGNIFICANT CHANGE UP (ref 96–108)
CO2 SERPL-SCNC: 23 MMOL/L — SIGNIFICANT CHANGE UP (ref 22–29)
CREAT SERPL-MCNC: 1.11 MG/DL — SIGNIFICANT CHANGE UP (ref 0.5–1.3)
EGFR: 65 ML/MIN/1.73M2 — SIGNIFICANT CHANGE UP
GLUCOSE SERPL-MCNC: 111 MG/DL — HIGH (ref 70–99)
HCT VFR BLD CALC: 36.6 % — LOW (ref 39–50)
HGB BLD-MCNC: 12.2 G/DL — LOW (ref 13–17)
MAGNESIUM SERPL-MCNC: 2.3 MG/DL — SIGNIFICANT CHANGE UP (ref 1.6–2.6)
MCHC RBC-ENTMCNC: 31.9 PG — SIGNIFICANT CHANGE UP (ref 27–34)
MCHC RBC-ENTMCNC: 33.3 GM/DL — SIGNIFICANT CHANGE UP (ref 32–36)
MCV RBC AUTO: 95.6 FL — SIGNIFICANT CHANGE UP (ref 80–100)
PLATELET # BLD AUTO: 141 K/UL — LOW (ref 150–400)
POTASSIUM SERPL-MCNC: 4.4 MMOL/L — SIGNIFICANT CHANGE UP (ref 3.5–5.3)
POTASSIUM SERPL-SCNC: 4.4 MMOL/L — SIGNIFICANT CHANGE UP (ref 3.5–5.3)
RBC # BLD: 3.83 M/UL — LOW (ref 4.2–5.8)
RBC # FLD: 15.8 % — HIGH (ref 10.3–14.5)
SODIUM SERPL-SCNC: 135 MMOL/L — SIGNIFICANT CHANGE UP (ref 135–145)
SURGICAL PATHOLOGY STUDY: SIGNIFICANT CHANGE UP
WBC # BLD: 17.32 K/UL — HIGH (ref 3.8–10.5)
WBC # FLD AUTO: 17.32 K/UL — HIGH (ref 3.8–10.5)

## 2024-04-25 PROCEDURE — 99232 SBSQ HOSP IP/OBS MODERATE 35: CPT | Mod: FS

## 2024-04-25 PROCEDURE — 99291 CRITICAL CARE FIRST HOUR: CPT

## 2024-04-25 PROCEDURE — 71045 X-RAY EXAM CHEST 1 VIEW: CPT | Mod: 26

## 2024-04-25 PROCEDURE — 99292 CRITICAL CARE ADDL 30 MIN: CPT

## 2024-04-25 RX ORDER — FUROSEMIDE 40 MG
40 TABLET ORAL DAILY
Refills: 0 | Status: DISCONTINUED | OUTPATIENT
Start: 2024-04-25 | End: 2024-04-28

## 2024-04-25 RX ORDER — METOPROLOL TARTRATE 50 MG
25 TABLET ORAL
Refills: 0 | Status: DISCONTINUED | OUTPATIENT
Start: 2024-04-25 | End: 2024-04-28

## 2024-04-25 RX ORDER — ASPIRIN/CALCIUM CARB/MAGNESIUM 324 MG
81 TABLET ORAL DAILY
Refills: 0 | Status: DISCONTINUED | OUTPATIENT
Start: 2024-04-26 | End: 2024-04-28

## 2024-04-25 RX ADMIN — PANTOPRAZOLE SODIUM 40 MILLIGRAM(S): 20 TABLET, DELAYED RELEASE ORAL at 11:52

## 2024-04-25 RX ADMIN — Medication 650 MILLIGRAM(S): at 07:00

## 2024-04-25 RX ADMIN — Medication 12.5 MILLIGRAM(S): at 06:32

## 2024-04-25 RX ADMIN — HEPARIN SODIUM 5000 UNIT(S): 5000 INJECTION INTRAVENOUS; SUBCUTANEOUS at 06:32

## 2024-04-25 RX ADMIN — Medication 3 MILLILITER(S): at 04:23

## 2024-04-25 RX ADMIN — Medication 25 MILLIGRAM(S): at 17:16

## 2024-04-25 RX ADMIN — Medication 325 MILLIGRAM(S): at 11:51

## 2024-04-25 RX ADMIN — CLOPIDOGREL BISULFATE 75 MILLIGRAM(S): 75 TABLET, FILM COATED ORAL at 11:51

## 2024-04-25 RX ADMIN — Medication 650 MILLIGRAM(S): at 18:55

## 2024-04-25 RX ADMIN — Medication 5 MILLIGRAM(S): at 21:05

## 2024-04-25 RX ADMIN — Medication 3 MILLILITER(S): at 15:40

## 2024-04-25 RX ADMIN — Medication 3 MILLILITER(S): at 20:27

## 2024-04-25 RX ADMIN — SENNA PLUS 2 TABLET(S): 8.6 TABLET ORAL at 21:05

## 2024-04-25 RX ADMIN — Medication 650 MILLIGRAM(S): at 18:20

## 2024-04-25 RX ADMIN — HEPARIN SODIUM 5000 UNIT(S): 5000 INJECTION INTRAVENOUS; SUBCUTANEOUS at 21:06

## 2024-04-25 RX ADMIN — Medication 650 MILLIGRAM(S): at 06:47

## 2024-04-25 RX ADMIN — HEPARIN SODIUM 5000 UNIT(S): 5000 INJECTION INTRAVENOUS; SUBCUTANEOUS at 13:10

## 2024-04-25 RX ADMIN — Medication 3 MILLILITER(S): at 09:36

## 2024-04-25 RX ADMIN — BUDESONIDE AND FORMOTEROL FUMARATE DIHYDRATE 2 PUFF(S): 160; 4.5 AEROSOL RESPIRATORY (INHALATION) at 09:36

## 2024-04-25 NOTE — DISCHARGE NOTE PROVIDER - HOSPITAL COURSE
84 male PMHx CAD, AS, HLD, HTN, asthma, COPD, former smoker (4.5 cigars/daily, ~ 50years, quit 2018), Statin intolerance, CKD3 (baseline sCr 1.4), PNA/Empyema, pericarditis and possible adrenal tumor/ pheochromocytoma. In 2019 STEMI and underwent cath which showed moderate TVD and normal EF - no intervention was diagnosed with pericarditis. Has close follow up with his cardiologist and nephrologist for new onset secondary HTN (reportedly SBP 200s). Imaging in 1/2022 noted 8mm left adrenal nodule (repeat 1/2023 showing stable appearance), pheochromocytoma workup indeterminate. MR abdomen cannot rule out neuroendocrine tumor. Was started on beta blocker and diuretics with improvement in blood pressure. Patient with progressive ROJO and intermittent lower extremity edema. Had CTA of coronaries to follow up on progression of his CAD which revealed total calcium score of 3065. LHC then revealed LM and severe TVD. Transfered to [facility] for CABG eval on 4/17. Renal clearance for possible pheochromocytoma obtained; patient now s/p CABGx3 on 4/18 with Dr. Cho. Intraoperative course with 2x PRBC, 3x PLT, 10 cryo; post op course with elevated chest tube output requiring additional 4x PRBC, 2x FFP, 500 feiba and lactic acidosis.  Post extubation respiratory support provided via High FLow nasal cannula, now weaned off to low flow oxygen via NC.  His LISA completely resolved and he passed trial of void without issue. Notably had persistently moderate O2 requirments 3-4L prompting CT chest noncontrast 4/24 which revealed large left extrapleural hematoma which then began auto-evacuating via left chest tube site; as drainage occurred O2 requirements also diminished. Patient has remained hemodynamically stable and stable from a respiratory standpoint and is deemed appropriate for discharge home per  *** Patient will be seen in clinic for follow up. 84 male PMHx CAD, AS, HLD, HTN, asthma, COPD, former smoker (4.5 cigars/daily, ~ 50years, quit 2018), Statin intolerance, CKD3 (baseline sCr 1.4), PNA/Empyema, pericarditis and possible adrenal tumor/ pheochromocytoma. In 2019 STEMI and underwent cath which showed moderate TVD and normal EF - no intervention was diagnosed with pericarditis. Has close follow up with his cardiologist and nephrologist for new onset secondary HTN (reportedly SBP 200s). Imaging in 1/2022 noted 8mm left adrenal nodule (repeat 1/2023 showing stable appearance), pheochromocytoma workup indeterminate. MR abdomen cannot rule out neuroendocrine tumor. Was started on beta blocker and diuretics with improvement in blood pressure. Patient with progressive ROJO and intermittent lower extremity edema. Had CTA of coronaries to follow up on progression of his CAD which revealed total calcium score of 3065. LHC then revealed LM and severe TVD. Transfered to [facility] for CABG eval on 4/17. Renal clearance for possible pheochromocytoma obtained; patient now s/p CABGx3 on 4/18 with Dr. Cho. Intraoperative course with 2x PRBC, 3x PLT, 10 cryo; post op course with elevated chest tube output requiring additional 4x PRBC, 2x FFP, 500 feiba and lactic acidosis.  Post extubation respiratory support provided via High FLow nasal cannula, now weaned off to low flow oxygen via NC.  His LISA completely resolved and he passed trial of void without issue. Notably had persistently moderate O2 requirements 3-4L prompting CT chest noncontrast 4/24 which revealed large left extrapleural hematoma which then began auto-evacuating via left chest tube site; as drainage occurred O2 requirements also diminished, but still required home O2 at discharge after walk test. Patient has remained hemodynamically stable and stable from a respiratory standpoint and is deemed appropriate for discharge home per Dr. Desai (covering for Dr. Cho) Patient will be seen in clinic for follow up. He will discharge home with home O2, home PT/OT.

## 2024-04-25 NOTE — CHART NOTE - NSCHARTNOTEFT_GEN_A_CORE
Source: Patient [ x]  Family [ ]   other [ x]  EMR and staff     Current Diet: Diet, Regular:   Consistent Carbohydrate {No Snacks} (CSTCHO)  DASH/TLC {Sodium & Cholesterol Restricted} (DASH)  Supplement Feeding Modality:  Oral  Ensure Enlive Cans or Servings Per Day:  1       Frequency:  Three Times a day (04-21-24 @ 18:50)    PO intake:  < 50% [x ]   50-75%  [ ]   %  [ ]  other :    Source for PO intake [x ] Patient [ ] family [ ] chart [ ] staff [ ] other    Current Weight:   4/25: 62.4 kg   4/21: 69.3 kg   4/18: 52.0 kg   4/17: 52.5 kg   (Generalized edema)     % Weight Change: Unsure of accuracy of weights; will continue to monitor weights for trends.     Pertinent Medications: MEDICATIONS  (STANDING):  albuterol/ipratropium for Nebulization 3 milliLiter(s) Nebulizer every 6 hours  aspirin enteric coated 325 milliGRAM(s) Oral daily  bisacodyl Suppository 10 milliGRAM(s) Rectal once  budesonide 160 MICROgram(s)/formoterol 4.5 MICROgram(s) Inhaler 2 Puff(s) Inhalation two times a day  clopidogrel Tablet 75 milliGRAM(s) Oral daily  furosemide    Tablet 40 milliGRAM(s) Oral daily  heparin   Injectable 5000 Unit(s) SubCutaneous every 8 hours  melatonin 5 milliGRAM(s) Oral at bedtime  metoprolol tartrate 25 milliGRAM(s) Oral two times a day  pantoprazole    Tablet 40 milliGRAM(s) Oral daily  polyethylene glycol 3350 17 Gram(s) Oral daily  senna 2 Tablet(s) Oral at bedtime    MEDICATIONS  (PRN):  acetaminophen     Tablet .. 650 milliGRAM(s) Oral every 6 hours PRN Mild Pain (1 - 3)  albuterol/ipratropium for Nebulization 3 milliLiter(s) Nebulizer every 6 hours PRN Shortness of Breath  oxyCODONE    IR 10 milliGRAM(s) Oral every 4 hours PRN Severe Pain (7 - 10)    Pertinent Labs: CBC Full  -  ( 25 Apr 2024 02:23 )  WBC Count : 17.32 K/uL  RBC Count : 3.83 M/uL  Hemoglobin : 12.2 g/dL  Hematocrit : 36.6 %  Platelet Count - Automated : 141 K/uL  Mean Cell Volume : 95.6 fl  Mean Cell Hemoglobin : 31.9 pg  Mean Cell Hemoglobin Concentration : 33.3 gm/dL      Skin: MSI, R leg EVH     Nutrition focused physical exam conducted - found signs of malnutrition [ ]absent [x ]present    Subcutaneous fat loss: [x ] Orbital fat pads region, [x ]Buccal fat region, [x ]Triceps region,  x]Ribs region    Muscle wasting: [x ]Temples region, [x ]Clavicle region, [x ]Shoulder region, [ ]Scapula region, [ ]Interosseous region,  [ ]thigh region, [ ]Calf region    Estimated Needs:   [x ] no change since previous assessment  [ ] recalculated:    Hospital Course:   Pt is a 83 y/o male with CAD, HLD, HTN, asthma/COPD, former tobacco use, statin intolerance, baseline CKD3 (Baseline Cr 1.4), history of PNA/empyema, pericarditis, and adrenal tumor (?pheochromocytoma; cleared by nephrology prior to OR) who underwent bioprosthetic AVR and CABGx3 on 4/18 by Dr. Cho. Post operative course with bleeding requiring 4x PRBC, 2x FFP, 500 FEIBA in immediate post op period; post operative respiratory insufficiency requiring HFNC (now weaned off), and LISA on CKD which is improving.        Current Nutrition Diagnosis:  Pt remains at high nutrition risk secondary to severe (acute) protein calorie malnutrition related to inadequate protein energy intake with increased needs in setting of s/p AVR, CABGx 3, post op resp insufficiency, LISA on CKD as evidenced by meeting < 50% estimated energy needs > month, physical signs of mod muscle/fat loss. Pt report not having much of an appetite; reports nothing tastes that good. Ensure shakes piling at bedside; pt report putting in his cereal. Encourage pt to sip throughout the day; foof preferences obtained. Recommendations below:     Recommendations:   1. RX: MVI and Vit. C daily (500mg).   2. Check weight daily to monitor trends   3. Encourage with meals.   4. Continue with Ensure TID   5. Monitor BUN, H/H     Monitoring and Evaluation:   [x ] PO intake [x ] Tolerance to diet prescription [X] Weights  [X] Follow up per protocol [X] Labs: Source: Patient [ x]  Family [ ]   other [ x]  EMR and staff     Current Diet: Diet, Regular:   Consistent Carbohydrate {No Snacks} (CSTCHO)  DASH/TLC {Sodium & Cholesterol Restricted} (DASH)  Supplement Feeding Modality:  Oral  Ensure Enlive Cans or Servings Per Day:  1       Frequency:  Three Times a day (04-21-24 @ 18:50)    PO intake:  < 50% [x ]   50-75%  [ ]   %  [ ]  other :    Source for PO intake [x ] Patient [ ] family [ ] chart [ ] staff [ ] other    Current Weight:   4/25: 62.4 kg   4/21: 69.3 kg   4/18: 52.0 kg   4/17: 52.5 kg   (Generalized edema)     % Weight Change: Unsure of accuracy of weights; will continue to monitor weights for trends.     Pertinent Medications: MEDICATIONS  (STANDING):  albuterol/ipratropium for Nebulization 3 milliLiter(s) Nebulizer every 6 hours  aspirin enteric coated 325 milliGRAM(s) Oral daily  bisacodyl Suppository 10 milliGRAM(s) Rectal once  budesonide 160 MICROgram(s)/formoterol 4.5 MICROgram(s) Inhaler 2 Puff(s) Inhalation two times a day  clopidogrel Tablet 75 milliGRAM(s) Oral daily  furosemide    Tablet 40 milliGRAM(s) Oral daily  heparin   Injectable 5000 Unit(s) SubCutaneous every 8 hours  melatonin 5 milliGRAM(s) Oral at bedtime  metoprolol tartrate 25 milliGRAM(s) Oral two times a day  pantoprazole    Tablet 40 milliGRAM(s) Oral daily  polyethylene glycol 3350 17 Gram(s) Oral daily  senna 2 Tablet(s) Oral at bedtime    MEDICATIONS  (PRN):  acetaminophen     Tablet .. 650 milliGRAM(s) Oral every 6 hours PRN Mild Pain (1 - 3)  albuterol/ipratropium for Nebulization 3 milliLiter(s) Nebulizer every 6 hours PRN Shortness of Breath  oxyCODONE    IR 10 milliGRAM(s) Oral every 4 hours PRN Severe Pain (7 - 10)    Pertinent Labs: CBC Full  -  ( 25 Apr 2024 02:23 )  WBC Count : 17.32 K/uL  RBC Count : 3.83 M/uL  Hemoglobin : 12.2 g/dL  Hematocrit : 36.6 %  Platelet Count - Automated : 141 K/uL  Mean Cell Volume : 95.6 fl  Mean Cell Hemoglobin : 31.9 pg  Mean Cell Hemoglobin Concentration : 33.3 gm/dL      Skin: MSI, R leg EVH     Nutrition focused physical exam conducted - found signs of malnutrition [ ]absent [x ]present    Subcutaneous fat loss: [x ] Orbital fat pads region, [x ]Buccal fat region, [x ]Triceps region,  x]Ribs region    Muscle wasting: [x ]Temples region, [x ]Clavicle region, [x ]Shoulder region, [ ]Scapula region, [ ]Interosseous region,  [ ]thigh region, [ ]Calf region    Estimated Needs:   [x ] no change since previous assessment  [ ] recalculated:    Hospital Course:   Pt is a 83 y/o male with CAD, HLD, HTN, asthma/COPD, former tobacco use, statin intolerance, baseline CKD3 (Baseline Cr 1.4), history of PNA/empyema, pericarditis, and adrenal tumor (?pheochromocytoma; cleared by nephrology prior to OR) who underwent bioprosthetic AVR and CABGx3 on 4/18 by Dr. Cho. Post operative course with bleeding requiring 4x PRBC, 2x FFP, 500 FEIBA in immediate post op period; post operative respiratory insufficiency requiring HFNC (now weaned off), and LISA on CKD which is improving.        Current Nutrition Diagnosis:  Pt remains at high nutrition risk secondary to severe (acute) protein calorie malnutrition related to inadequate protein energy intake with increased needs in setting of s/p AVR, CABGx 3, post op resp insufficiency, LISA on CKD as evidenced by meeting < 50% estimated energy needs > 5 days, physical signs of mod muscle/fat loss. Pt report not having much of an appetite; reports nothing tastes that good. Ensure shakes piling at bedside; pt report putting in his cereal. Encourage pt to sip throughout the day; foof preferences obtained. Recommendations below:     Recommendations:   1. RX: MVI and Vit. C daily (500mg).   2. Check weight daily to monitor trends   3. Encourage with meals.   4. Continue with Ensure TID   5. Monitor BUN, H/H     Monitoring and Evaluation:   [x ] PO intake [x ] Tolerance to diet prescription [X] Weights  [X] Follow up per protocol [X] Labs:

## 2024-04-25 NOTE — DISCHARGE NOTE PROVIDER - NSDCFUSCHEDAPPT_GEN_ALL_CORE_FT
Steve Blackburn Physician [facility]  CARDIOLOGY [address]  Scheduled Appointment: 05/21/2024

## 2024-04-25 NOTE — DISCHARGE NOTE PROVIDER - NSDCCPTREATMENT_GEN_ALL_CORE_FT
PRINCIPAL PROCEDURE  Procedure: CABG, 2-3 vessels, with aortic valve replacement  Findings and Treatment:      PRINCIPAL PROCEDURE  Procedure: CABG, 2-3 vessels, with aortic valve replacement  Findings and Treatment: Yarbrough Inspiris Resilia Aortic Valve 23 mm  Serial #: [id number] / Model # [id number]

## 2024-04-25 NOTE — DISCHARGE NOTE PROVIDER - PROVIDER TOKENS
PROVIDER:[TOKEN:[[id number]]],PROVIDER:[TOKEN:[[id number]],FOLLOWUP:[2 weeks]],PROVIDER:[TOKEN:[[id number]],FOLLOWUP:[2 weeks]],FREE:[LAST:[Masani],FIRST:[Ward],PHONE:[(   )    -],FAX:[(   )    -],FOLLOWUP:[2 weeks],ESTABLISHEDPATIENT:[T]]

## 2024-04-25 NOTE — DISCHARGE NOTE PROVIDER - DETAILS OF MALNUTRITION DIAGNOSIS/DIAGNOSES
This patient has been assessed with a concern for Malnutrition and was treated during this hospitalization for the following Nutrition diagnosis/diagnoses:     -  04/25/2024: Severe protein-calorie malnutrition   -  04/25/2024: Underweight (BMI < 19)

## 2024-04-25 NOTE — DISCHARGE NOTE PROVIDER - NSDCFUADDAPPT_GEN_ALL_CORE_FT
Please follow up with Dr. Kim on _____ at [facility].     **Please be ~ 30 mins early on the day of your appointment to have a chest xray (script is in your folder) taken on the first floor in radiology PRIOR to going to the CT surgery office.**     Please follow up with Dr. Cho by calling the CT Surgery office at [phone] on the next open business day to make an appointment.     Please follow up with your Primary Care Physician and Cardiologist in  2-4 weeks from discharge.    The Cardiac Surgery / Thoracic Surgery office is located at [facility], first floor. Take a left at the end of the lobby until the end of that yeager (past the elevator bank). Make a left and the office is on your right across from the elevators.

## 2024-04-25 NOTE — DISCHARGE NOTE PROVIDER - CARE PROVIDER_API CALL
Sriram Cho  Thoracic and Cardiac Surgery  [address] [place] [address]  Phone: [phone]  Fax: [phone]  Follow Up Time:     Steve Blackburn  Interventional Cardiology  [address] [place] [address]  Phone: [phone]  Fax: [phone]  Follow Up Time: 2 weeks    Navdeep Solorio Pelkie  Pulmonary Disease  [address] [facility], [address]  [place] [address]  Phone: [phone]  Fax: [phone]  Follow Up Time: 2 weeks    Ward Vora  Phone: (   )    -  Fax: (   )    -  Established Patient  Follow Up Time: 2 weeks

## 2024-04-25 NOTE — DISCHARGE NOTE PROVIDER - CARE PROVIDERS DIRECT ADDRESSES
[email or web address]

## 2024-04-25 NOTE — DISCHARGE NOTE PROVIDER - NSDCCPCAREPLAN_GEN_ALL_CORE_FT
PRINCIPAL DISCHARGE DIAGNOSIS  Diagnosis: CAD (coronary artery disease)  Assessment and Plan of Treatment: Take all medications as prescribed  Follow up with your cardiologist in 2 weeks after discharge      SECONDARY DISCHARGE DIAGNOSES  Diagnosis: History of COPD  Assessment and Plan of Treatment: Follow up with your pulmonologist after discharge  Use home oxygen as prescribed    Diagnosis: Acute kidney injury superimposed on CKD  Assessment and Plan of Treatment: Follow up with your nephrologist after discharge    Diagnosis: Severe aortic valve stenosis  Assessment and Plan of Treatment:      PRINCIPAL DISCHARGE DIAGNOSIS  Diagnosis: CAD (coronary artery disease)  Assessment and Plan of Treatment: Take all medications as prescribed  Follow up with your cardiologist in 2 weeks after discharge  - Shower daily, soap and water to incisions  - No lotions/creams to incisions  - No swimming/pools/hot tubs/baths until incisions healed  - No driving or heavy lifting >10lbs until cleared by surgeon  - Refer to discharge booklet for additional instructions      SECONDARY DISCHARGE DIAGNOSES  Diagnosis: Paroxysmal atrial fibrillation  Assessment and Plan of Treatment: While you were in the hospital, you had an arrhythmia called atrial fibrillation. This is a common arrhythmia seen after heart surgery but it is important to take your medications as ordered to reduce the risk of it recurring or continuing in the future. You may be on a blood thinner to reduce your risk of a stroke with this arrhythmia. If you are experiencing chest pain or palpitations, please call us right away.    Diagnosis: History of COPD  Assessment and Plan of Treatment: Follow up with your pulmonologist after discharge  continue your Breo inhaler  Use home oxygen as prescribed    Diagnosis: Acute kidney injury superimposed on CKD  Assessment and Plan of Treatment: Follow up with your nephrologist after discharge    Diagnosis: Severe aortic valve stenosis  Assessment and Plan of Treatment:

## 2024-04-25 NOTE — DISCHARGE NOTE PROVIDER - NSDCMRMEDTOKEN_GEN_ALL_CORE_FT
alirocumab 150 mg/mL subcutaneous solution: 150 milligram(s) subcutaneously every 2 weeks  Breo Ellipta 100 mcg-25 mcg/inh inhalation powder: 1 puff(s) inhaled once a day  Coreg 6.25 mg oral tablet: 1 tab(s) orally every other day  eplerenone 25 mg oral tablet: 1 tab(s) orally once a day  finasteride 5 mg oral tablet: 1 tab(s) orally once a day  Norvasc 10 mg oral tablet: 1 tab(s) orally once a day  Prolia 60 mg/mL subcutaneous solution: 60 milligram(s) subcutaneously every 6 months   Aldactone 25 mg oral tablet: 1 tab(s) orally once a day  alirocumab 150 mg/mL subcutaneous solution: 150 milligram(s) subcutaneously every 2 weeks  apixaban 2.5 mg oral tablet: 1 tab(s) orally every 12 hours  aspirin 81 mg oral delayed release tablet: 1 tab(s) orally once a day  Breo Ellipta 100 mcg-25 mcg/inh inhalation powder: 1 puff(s) inhaled once a day  finasteride 5 mg oral tablet: 1 tab(s) orally once a day  furosemide 40 mg oral tablet: 1 tab(s) orally once a day  metoprolol tartrate 25 mg oral tablet: 1 tab(s) orally 2 times a day  Prolia 60 mg/mL subcutaneous solution: 60 milligram(s) subcutaneously every 6 months  senna leaf extract oral tablet: 2 tab(s) orally once a day (at bedtime)

## 2024-04-25 NOTE — DISCHARGE NOTE PROVIDER - NSDCFUADDINST_GEN_ALL_CORE_FT
Please call the Cardiothoracic Surgery office at [phone] if you are experiencing any shortness of breath, chest pain, fevers or chills, drainage from the incisions, persistent nausea, vomiting or if you have any questions about your medications. If the symptoms are severe, call 911 and go to the nearest hospital.

## 2024-04-25 NOTE — DIETITIAN NUTRITION RISK NOTIFICATION - TREATMENT: THE FOLLOWING DIET HAS BEEN RECOMMENDED
Diet, Regular:   Consistent Carbohydrate {No Snacks} (CSTCHO)  DASH/TLC {Sodium & Cholesterol Restricted} (DASH)  Supplement Feeding Modality:  Oral  Ensure Enlive Cans or Servings Per Day:  1       Frequency:  Three Times a day (04-21-24 @ 18:50) [Active]

## 2024-04-26 ENCOUNTER — TRANSCRIPTION ENCOUNTER (OUTPATIENT)
Age: 84
End: 2024-04-26

## 2024-04-26 LAB
ANION GAP SERPL CALC-SCNC: 14 MMOL/L — SIGNIFICANT CHANGE UP (ref 5–17)
BUN SERPL-MCNC: 42.6 MG/DL — HIGH (ref 8–20)
CALCIUM SERPL-MCNC: 7.7 MG/DL — LOW (ref 8.4–10.5)
CHLORIDE SERPL-SCNC: 101 MMOL/L — SIGNIFICANT CHANGE UP (ref 96–108)
CO2 SERPL-SCNC: 23 MMOL/L — SIGNIFICANT CHANGE UP (ref 22–29)
CREAT SERPL-MCNC: 1.25 MG/DL — SIGNIFICANT CHANGE UP (ref 0.5–1.3)
EGFR: 56 ML/MIN/1.73M2 — LOW
GLUCOSE SERPL-MCNC: 100 MG/DL — HIGH (ref 70–99)
HCT VFR BLD CALC: 35.7 % — LOW (ref 39–50)
HGB BLD-MCNC: 11.9 G/DL — LOW (ref 13–17)
MAGNESIUM SERPL-MCNC: 2.2 MG/DL — SIGNIFICANT CHANGE UP (ref 1.6–2.6)
MCHC RBC-ENTMCNC: 31.9 PG — SIGNIFICANT CHANGE UP (ref 27–34)
MCHC RBC-ENTMCNC: 33.3 GM/DL — SIGNIFICANT CHANGE UP (ref 32–36)
MCV RBC AUTO: 95.7 FL — SIGNIFICANT CHANGE UP (ref 80–100)
PLATELET # BLD AUTO: 176 K/UL — SIGNIFICANT CHANGE UP (ref 150–400)
POTASSIUM SERPL-MCNC: 4.2 MMOL/L — SIGNIFICANT CHANGE UP (ref 3.5–5.3)
POTASSIUM SERPL-SCNC: 4.2 MMOL/L — SIGNIFICANT CHANGE UP (ref 3.5–5.3)
RBC # BLD: 3.73 M/UL — LOW (ref 4.2–5.8)
RBC # FLD: 16.1 % — HIGH (ref 10.3–14.5)
SODIUM SERPL-SCNC: 138 MMOL/L — SIGNIFICANT CHANGE UP (ref 135–145)
WBC # BLD: 12.26 K/UL — HIGH (ref 3.8–10.5)
WBC # FLD AUTO: 12.26 K/UL — HIGH (ref 3.8–10.5)

## 2024-04-26 PROCEDURE — 99292 CRITICAL CARE ADDL 30 MIN: CPT

## 2024-04-26 PROCEDURE — 93010 ELECTROCARDIOGRAM REPORT: CPT

## 2024-04-26 PROCEDURE — 99024 POSTOP FOLLOW-UP VISIT: CPT

## 2024-04-26 PROCEDURE — 99291 CRITICAL CARE FIRST HOUR: CPT

## 2024-04-26 PROCEDURE — 71045 X-RAY EXAM CHEST 1 VIEW: CPT | Mod: 26

## 2024-04-26 PROCEDURE — 71250 CT THORAX DX C-: CPT | Mod: 26

## 2024-04-26 RX ORDER — APIXABAN 2.5 MG/1
2.5 TABLET, FILM COATED ORAL EVERY 12 HOURS
Refills: 0 | Status: DISCONTINUED | OUTPATIENT
Start: 2024-04-26 | End: 2024-04-28

## 2024-04-26 RX ADMIN — Medication 3 MILLILITER(S): at 03:44

## 2024-04-26 RX ADMIN — PANTOPRAZOLE SODIUM 40 MILLIGRAM(S): 20 TABLET, DELAYED RELEASE ORAL at 11:42

## 2024-04-26 RX ADMIN — Medication 3 MILLILITER(S): at 08:21

## 2024-04-26 RX ADMIN — Medication 40 MILLIGRAM(S): at 06:06

## 2024-04-26 RX ADMIN — Medication 25 MILLIGRAM(S): at 17:10

## 2024-04-26 RX ADMIN — SENNA PLUS 2 TABLET(S): 8.6 TABLET ORAL at 22:01

## 2024-04-26 RX ADMIN — Medication 81 MILLIGRAM(S): at 11:42

## 2024-04-26 RX ADMIN — POLYETHYLENE GLYCOL 3350 17 GRAM(S): 17 POWDER, FOR SOLUTION ORAL at 11:42

## 2024-04-26 RX ADMIN — APIXABAN 2.5 MILLIGRAM(S): 2.5 TABLET, FILM COATED ORAL at 17:10

## 2024-04-26 RX ADMIN — HEPARIN SODIUM 5000 UNIT(S): 5000 INJECTION INTRAVENOUS; SUBCUTANEOUS at 06:06

## 2024-04-26 RX ADMIN — BUDESONIDE AND FORMOTEROL FUMARATE DIHYDRATE 2 PUFF(S): 160; 4.5 AEROSOL RESPIRATORY (INHALATION) at 20:43

## 2024-04-26 RX ADMIN — BUDESONIDE AND FORMOTEROL FUMARATE DIHYDRATE 2 PUFF(S): 160; 4.5 AEROSOL RESPIRATORY (INHALATION) at 08:22

## 2024-04-26 RX ADMIN — Medication 3 MILLILITER(S): at 20:43

## 2024-04-26 RX ADMIN — Medication 25 MILLIGRAM(S): at 06:06

## 2024-04-26 RX ADMIN — Medication 5 MILLIGRAM(S): at 21:17

## 2024-04-26 NOTE — HOME OXYGEN EVALUATION NOTE - NSHOMEOXYEVAL_PHYATTNON-COVID_GEN_ALL_CORE
All acute illnesses and/or exacerbations have been treated and resolved; patient is in a chronic stable state. Alternative treatment methods have been tried and failed.
All acute illnesses and/or exacerbations have been treated and resolved; patient is in a chronic stable state. Alternative treatment methods have been tried and failed.

## 2024-04-27 LAB
ANION GAP SERPL CALC-SCNC: 10 MMOL/L — SIGNIFICANT CHANGE UP (ref 5–17)
BUN SERPL-MCNC: 46.1 MG/DL — HIGH (ref 8–20)
CALCIUM SERPL-MCNC: 7.6 MG/DL — LOW (ref 8.4–10.5)
CHLORIDE SERPL-SCNC: 99 MMOL/L — SIGNIFICANT CHANGE UP (ref 96–108)
CO2 SERPL-SCNC: 26 MMOL/L — SIGNIFICANT CHANGE UP (ref 22–29)
CREAT SERPL-MCNC: 1.43 MG/DL — HIGH (ref 0.5–1.3)
EGFR: 48 ML/MIN/1.73M2 — LOW
GLUCOSE SERPL-MCNC: 101 MG/DL — HIGH (ref 70–99)
HCT VFR BLD CALC: 31.8 % — LOW (ref 39–50)
HGB BLD-MCNC: 10.6 G/DL — LOW (ref 13–17)
MAGNESIUM SERPL-MCNC: 2.4 MG/DL — SIGNIFICANT CHANGE UP (ref 1.8–2.6)
MCHC RBC-ENTMCNC: 31.9 PG — SIGNIFICANT CHANGE UP (ref 27–34)
MCHC RBC-ENTMCNC: 33.3 GM/DL — SIGNIFICANT CHANGE UP (ref 32–36)
MCV RBC AUTO: 95.8 FL — SIGNIFICANT CHANGE UP (ref 80–100)
PLATELET # BLD AUTO: 212 K/UL — SIGNIFICANT CHANGE UP (ref 150–400)
POTASSIUM SERPL-MCNC: 4 MMOL/L — SIGNIFICANT CHANGE UP (ref 3.5–5.3)
POTASSIUM SERPL-SCNC: 4 MMOL/L — SIGNIFICANT CHANGE UP (ref 3.5–5.3)
RBC # BLD: 3.32 M/UL — LOW (ref 4.2–5.8)
RBC # FLD: 16 % — HIGH (ref 10.3–14.5)
SODIUM SERPL-SCNC: 135 MMOL/L — SIGNIFICANT CHANGE UP (ref 135–145)
WBC # BLD: 10.57 K/UL — HIGH (ref 3.8–10.5)
WBC # FLD AUTO: 10.57 K/UL — HIGH (ref 3.8–10.5)

## 2024-04-27 PROCEDURE — 99233 SBSQ HOSP IP/OBS HIGH 50: CPT

## 2024-04-27 PROCEDURE — 99024 POSTOP FOLLOW-UP VISIT: CPT

## 2024-04-27 PROCEDURE — 71045 X-RAY EXAM CHEST 1 VIEW: CPT | Mod: 26

## 2024-04-27 RX ADMIN — SENNA PLUS 2 TABLET(S): 8.6 TABLET ORAL at 20:07

## 2024-04-27 RX ADMIN — Medication 25 MILLIGRAM(S): at 06:08

## 2024-04-27 RX ADMIN — Medication 81 MILLIGRAM(S): at 12:57

## 2024-04-27 RX ADMIN — Medication 3 MILLILITER(S): at 04:21

## 2024-04-27 RX ADMIN — APIXABAN 2.5 MILLIGRAM(S): 2.5 TABLET, FILM COATED ORAL at 06:09

## 2024-04-27 RX ADMIN — Medication 3 MILLILITER(S): at 20:38

## 2024-04-27 RX ADMIN — Medication 650 MILLIGRAM(S): at 21:07

## 2024-04-27 RX ADMIN — BUDESONIDE AND FORMOTEROL FUMARATE DIHYDRATE 2 PUFF(S): 160; 4.5 AEROSOL RESPIRATORY (INHALATION) at 08:52

## 2024-04-27 RX ADMIN — Medication 650 MILLIGRAM(S): at 20:07

## 2024-04-27 RX ADMIN — PANTOPRAZOLE SODIUM 40 MILLIGRAM(S): 20 TABLET, DELAYED RELEASE ORAL at 12:57

## 2024-04-27 RX ADMIN — Medication 25 MILLIGRAM(S): at 17:57

## 2024-04-27 RX ADMIN — POLYETHYLENE GLYCOL 3350 17 GRAM(S): 17 POWDER, FOR SOLUTION ORAL at 12:58

## 2024-04-27 RX ADMIN — Medication 5 MILLIGRAM(S): at 20:07

## 2024-04-27 RX ADMIN — APIXABAN 2.5 MILLIGRAM(S): 2.5 TABLET, FILM COATED ORAL at 17:57

## 2024-04-27 RX ADMIN — BUDESONIDE AND FORMOTEROL FUMARATE DIHYDRATE 2 PUFF(S): 160; 4.5 AEROSOL RESPIRATORY (INHALATION) at 20:38

## 2024-04-27 RX ADMIN — Medication 40 MILLIGRAM(S): at 06:09

## 2024-04-27 RX ADMIN — Medication 3 MILLILITER(S): at 15:20

## 2024-04-27 RX ADMIN — Medication 3 MILLILITER(S): at 08:52

## 2024-04-28 ENCOUNTER — TRANSCRIPTION ENCOUNTER (OUTPATIENT)
Age: 84
End: 2024-04-28

## 2024-04-28 VITALS
SYSTOLIC BLOOD PRESSURE: 124 MMHG | OXYGEN SATURATION: 94 % | HEART RATE: 84 BPM | RESPIRATION RATE: 23 BRPM | DIASTOLIC BLOOD PRESSURE: 79 MMHG

## 2024-04-28 LAB
ANION GAP SERPL CALC-SCNC: 11 MMOL/L — SIGNIFICANT CHANGE UP (ref 5–17)
BUN SERPL-MCNC: 45.9 MG/DL — HIGH (ref 8–20)
CALCIUM SERPL-MCNC: 7.3 MG/DL — LOW (ref 8.4–10.5)
CHLORIDE SERPL-SCNC: 99 MMOL/L — SIGNIFICANT CHANGE UP (ref 96–108)
CO2 SERPL-SCNC: 25 MMOL/L — SIGNIFICANT CHANGE UP (ref 22–29)
CREAT SERPL-MCNC: 1.45 MG/DL — HIGH (ref 0.5–1.3)
EGFR: 47 ML/MIN/1.73M2 — LOW
GLUCOSE SERPL-MCNC: 99 MG/DL — SIGNIFICANT CHANGE UP (ref 70–99)
HCT VFR BLD CALC: 30.1 % — LOW (ref 39–50)
HGB BLD-MCNC: 9.8 G/DL — LOW (ref 13–17)
MAGNESIUM SERPL-MCNC: 2.2 MG/DL — SIGNIFICANT CHANGE UP (ref 1.6–2.6)
MCHC RBC-ENTMCNC: 31.3 PG — SIGNIFICANT CHANGE UP (ref 27–34)
MCHC RBC-ENTMCNC: 32.6 GM/DL — SIGNIFICANT CHANGE UP (ref 32–36)
MCV RBC AUTO: 96.2 FL — SIGNIFICANT CHANGE UP (ref 80–100)
PLATELET # BLD AUTO: 225 K/UL — SIGNIFICANT CHANGE UP (ref 150–400)
POTASSIUM SERPL-MCNC: 3.7 MMOL/L — SIGNIFICANT CHANGE UP (ref 3.5–5.3)
POTASSIUM SERPL-SCNC: 3.7 MMOL/L — SIGNIFICANT CHANGE UP (ref 3.5–5.3)
RBC # BLD: 3.13 M/UL — LOW (ref 4.2–5.8)
RBC # FLD: 16.3 % — HIGH (ref 10.3–14.5)
SODIUM SERPL-SCNC: 135 MMOL/L — SIGNIFICANT CHANGE UP (ref 135–145)
WBC # BLD: 9.62 K/UL — SIGNIFICANT CHANGE UP (ref 3.8–10.5)
WBC # FLD AUTO: 9.62 K/UL — SIGNIFICANT CHANGE UP (ref 3.8–10.5)

## 2024-04-28 PROCEDURE — 86891 AUTOLOGOUS BLOOD OP SALVAGE: CPT

## 2024-04-28 PROCEDURE — 82962 GLUCOSE BLOOD TEST: CPT

## 2024-04-28 PROCEDURE — 82088 ASSAY OF ALDOSTERONE: CPT

## 2024-04-28 PROCEDURE — 87640 STAPH A DNA AMP PROBE: CPT

## 2024-04-28 PROCEDURE — 83036 HEMOGLOBIN GLYCOSYLATED A1C: CPT

## 2024-04-28 PROCEDURE — 94010 BREATHING CAPACITY TEST: CPT

## 2024-04-28 PROCEDURE — 36430 TRANSFUSION BLD/BLD COMPNT: CPT

## 2024-04-28 PROCEDURE — C1751: CPT

## 2024-04-28 PROCEDURE — 93880 EXTRACRANIAL BILAT STUDY: CPT

## 2024-04-28 PROCEDURE — 84100 ASSAY OF PHOSPHORUS: CPT

## 2024-04-28 PROCEDURE — 82533 TOTAL CORTISOL: CPT

## 2024-04-28 PROCEDURE — 81003 URINALYSIS AUTO W/O SCOPE: CPT

## 2024-04-28 PROCEDURE — 85610 PROTHROMBIN TIME: CPT

## 2024-04-28 PROCEDURE — 84484 ASSAY OF TROPONIN QUANT: CPT

## 2024-04-28 PROCEDURE — P9045: CPT

## 2024-04-28 PROCEDURE — 71045 X-RAY EXAM CHEST 1 VIEW: CPT | Mod: 26

## 2024-04-28 PROCEDURE — 86923 COMPATIBILITY TEST ELECTRIC: CPT

## 2024-04-28 PROCEDURE — 71250 CT THORAX DX C-: CPT | Mod: MC

## 2024-04-28 PROCEDURE — 82553 CREATINE MB FRACTION: CPT

## 2024-04-28 PROCEDURE — 86850 RBC ANTIBODY SCREEN: CPT

## 2024-04-28 PROCEDURE — 80048 BASIC METABOLIC PNL TOTAL CA: CPT

## 2024-04-28 PROCEDURE — 82435 ASSAY OF BLOOD CHLORIDE: CPT

## 2024-04-28 PROCEDURE — 85018 HEMOGLOBIN: CPT

## 2024-04-28 PROCEDURE — 82550 ASSAY OF CK (CPK): CPT

## 2024-04-28 PROCEDURE — C1889: CPT

## 2024-04-28 PROCEDURE — 85027 COMPLETE CBC AUTOMATED: CPT

## 2024-04-28 PROCEDURE — P9016: CPT

## 2024-04-28 PROCEDURE — P9100: CPT

## 2024-04-28 PROCEDURE — 87641 MR-STAPH DNA AMP PROBE: CPT

## 2024-04-28 PROCEDURE — 84132 ASSAY OF SERUM POTASSIUM: CPT

## 2024-04-28 PROCEDURE — 82803 BLOOD GASES ANY COMBINATION: CPT

## 2024-04-28 PROCEDURE — 80053 COMPREHEN METABOLIC PANEL: CPT

## 2024-04-28 PROCEDURE — 82947 ASSAY GLUCOSE BLOOD QUANT: CPT

## 2024-04-28 PROCEDURE — 82570 ASSAY OF URINE CREATININE: CPT

## 2024-04-28 PROCEDURE — 85014 HEMATOCRIT: CPT

## 2024-04-28 PROCEDURE — 83735 ASSAY OF MAGNESIUM: CPT

## 2024-04-28 PROCEDURE — 71045 X-RAY EXAM CHEST 1 VIEW: CPT

## 2024-04-28 PROCEDURE — 36415 COLL VENOUS BLD VENIPUNCTURE: CPT

## 2024-04-28 PROCEDURE — 85384 FIBRINOGEN ACTIVITY: CPT

## 2024-04-28 PROCEDURE — 82330 ASSAY OF CALCIUM: CPT

## 2024-04-28 PROCEDURE — 85025 COMPLETE CBC W/AUTO DIFF WBC: CPT

## 2024-04-28 PROCEDURE — 84443 ASSAY THYROID STIM HORMONE: CPT

## 2024-04-28 PROCEDURE — 85652 RBC SED RATE AUTOMATED: CPT

## 2024-04-28 PROCEDURE — 84295 ASSAY OF SERUM SODIUM: CPT

## 2024-04-28 PROCEDURE — 83880 ASSAY OF NATRIURETIC PEPTIDE: CPT

## 2024-04-28 PROCEDURE — P9047: CPT

## 2024-04-28 PROCEDURE — 84134 ASSAY OF PREALBUMIN: CPT

## 2024-04-28 PROCEDURE — P9037: CPT

## 2024-04-28 PROCEDURE — 93005 ELECTROCARDIOGRAM TRACING: CPT

## 2024-04-28 PROCEDURE — 86901 BLOOD TYPING SEROLOGIC RH(D): CPT

## 2024-04-28 PROCEDURE — C1769: CPT

## 2024-04-28 PROCEDURE — 86965 POOLING BLOOD PLATELETS: CPT

## 2024-04-28 PROCEDURE — P9040: CPT

## 2024-04-28 PROCEDURE — 76775 US EXAM ABDO BACK WALL LIM: CPT

## 2024-04-28 PROCEDURE — 86900 BLOOD TYPING SEROLOGIC ABO: CPT

## 2024-04-28 PROCEDURE — 83835 ASSAY OF METANEPHRINES: CPT

## 2024-04-28 PROCEDURE — P9012: CPT

## 2024-04-28 PROCEDURE — 83605 ASSAY OF LACTIC ACID: CPT

## 2024-04-28 PROCEDURE — 99024 POSTOP FOLLOW-UP VISIT: CPT

## 2024-04-28 PROCEDURE — 94003 VENT MGMT INPAT SUBQ DAY: CPT

## 2024-04-28 PROCEDURE — 88305 TISSUE EXAM BY PATHOLOGIST: CPT

## 2024-04-28 PROCEDURE — 93306 TTE W/DOPPLER COMPLETE: CPT

## 2024-04-28 PROCEDURE — 94760 N-INVAS EAR/PLS OXIMETRY 1: CPT

## 2024-04-28 PROCEDURE — 94640 AIRWAY INHALATION TREATMENT: CPT

## 2024-04-28 PROCEDURE — 84156 ASSAY OF PROTEIN URINE: CPT

## 2024-04-28 PROCEDURE — C1894: CPT

## 2024-04-28 PROCEDURE — 85730 THROMBOPLASTIN TIME PARTIAL: CPT

## 2024-04-28 PROCEDURE — 36600 WITHDRAWAL OF ARTERIAL BLOOD: CPT

## 2024-04-28 PROCEDURE — P9059: CPT

## 2024-04-28 RX ORDER — CARVEDILOL PHOSPHATE 80 MG/1
1 CAPSULE, EXTENDED RELEASE ORAL
Refills: 0 | DISCHARGE

## 2024-04-28 RX ORDER — ASPIRIN/CALCIUM CARB/MAGNESIUM 324 MG
1 TABLET ORAL
Qty: 30 | Refills: 1
Start: 2024-04-28

## 2024-04-28 RX ORDER — ACETAMINOPHEN 500 MG
1000 TABLET ORAL ONCE
Refills: 0 | Status: COMPLETED | OUTPATIENT
Start: 2024-04-28 | End: 2024-04-28

## 2024-04-28 RX ORDER — EPLERENONE 50 MG/1
1 TABLET, FILM COATED ORAL
Refills: 0 | DISCHARGE

## 2024-04-28 RX ORDER — FUROSEMIDE 40 MG
1 TABLET ORAL
Qty: 14 | Refills: 0
Start: 2024-04-28 | End: 2024-05-11

## 2024-04-28 RX ORDER — AMLODIPINE BESYLATE 2.5 MG/1
1 TABLET ORAL
Refills: 0 | DISCHARGE

## 2024-04-28 RX ORDER — SENNA PLUS 8.6 MG/1
2 TABLET ORAL
Qty: 60 | Refills: 0
Start: 2024-04-28 | End: 2024-05-27

## 2024-04-28 RX ORDER — POTASSIUM CHLORIDE 20 MEQ
40 PACKET (EA) ORAL ONCE
Refills: 0 | Status: COMPLETED | OUTPATIENT
Start: 2024-04-28 | End: 2024-04-28

## 2024-04-28 RX ORDER — METOPROLOL TARTRATE 50 MG
1 TABLET ORAL
Qty: 60 | Refills: 1
Start: 2024-04-28

## 2024-04-28 RX ORDER — APIXABAN 2.5 MG/1
1 TABLET, FILM COATED ORAL
Qty: 60 | Refills: 1
Start: 2024-04-28

## 2024-04-28 RX ORDER — SPIRONOLACTONE 25 MG/1
1 TABLET, FILM COATED ORAL
Qty: 14 | Refills: 0
Start: 2024-04-28 | End: 2024-05-11

## 2024-04-28 RX ADMIN — Medication 40 MILLIEQUIVALENT(S): at 05:46

## 2024-04-28 RX ADMIN — POLYETHYLENE GLYCOL 3350 17 GRAM(S): 17 POWDER, FOR SOLUTION ORAL at 09:12

## 2024-04-28 RX ADMIN — PANTOPRAZOLE SODIUM 40 MILLIGRAM(S): 20 TABLET, DELAYED RELEASE ORAL at 11:32

## 2024-04-28 RX ADMIN — Medication 3 MILLILITER(S): at 08:28

## 2024-04-28 RX ADMIN — Medication 400 MILLIGRAM(S): at 06:40

## 2024-04-28 RX ADMIN — Medication 3 MILLILITER(S): at 03:45

## 2024-04-28 RX ADMIN — APIXABAN 2.5 MILLIGRAM(S): 2.5 TABLET, FILM COATED ORAL at 05:46

## 2024-04-28 RX ADMIN — Medication 25 MILLIGRAM(S): at 05:46

## 2024-04-28 RX ADMIN — Medication 81 MILLIGRAM(S): at 11:32

## 2024-04-28 RX ADMIN — Medication 1000 MILLIGRAM(S): at 07:30

## 2024-04-28 RX ADMIN — BUDESONIDE AND FORMOTEROL FUMARATE DIHYDRATE 2 PUFF(S): 160; 4.5 AEROSOL RESPIRATORY (INHALATION) at 08:29

## 2024-04-28 RX ADMIN — Medication 40 MILLIGRAM(S): at 05:46

## 2024-04-28 NOTE — PROGRESS NOTE ADULT - PROBLEM SELECTOR PROBLEM 2
Acute kidney injury superimposed on CKD

## 2024-04-28 NOTE — PROGRESS NOTE ADULT - NUTRITIONAL ASSESSMENT
This patient has been assessed with a concern for Malnutrition and has been determined to have a diagnosis/diagnoses of Severe protein-calorie malnutrition and Underweight (BMI < 19).    This patient is being managed with:   Diet Regular-  Consistent Carbohydrate {No Snacks} (CSTCHO)  DASH/TLC {Sodium & Cholesterol Restricted} (DASH)  Supplement Feeding Modality:  Oral  Ensure Enlive Cans or Servings Per Day:  1       Frequency:  Three Times a day  Entered: Apr 21 2024  6:49PM  

## 2024-04-28 NOTE — PROGRESS NOTE ADULT - PROBLEM SELECTOR PROBLEM 4
History of COPD

## 2024-04-28 NOTE — PROGRESS NOTE ADULT - PROBLEM SELECTOR PLAN 4
not in exacerbation  - History of heavy cigar use, quit in 2018  - On breo at home, added symbicort  - f/u baseline RA abd, chest ct, PFTs  - CT chest: "New left upper lobe 5 mm solid nodule, and mild increased size of a 1.6 cm nodular opacity in the right upper lobe."  - Pulm recs appreciated, now on ATC and PRN Duonebs
not in exacerbation  history of heavy cigar use, quit in 2018  on breo at home, add symbicort  f/u baseline RA abd, chest ct, PFTs  CT chest: "New left upper lobe 5 mm solid nodule, and mild increased size of a 1.6 cm nodular opacity in the right upper lobe."  Pulm consult in AM
not in exacerbation  - History of heavy cigar use, quit in 2018  - On breo at home, added symbicort  - f/u baseline RA abd, chest ct, PFTs  - CT chest: "New left upper lobe 5 mm solid nodule, and mild increased size of a 1.6 cm nodular opacity in the right upper lobe."  - Pulm recs appreciated, now on ATC and PRN Duonebs
not in exacerbation  history of heavy cigar use, quit in 2018  on breo at home, add symbicort  f/u baseline RA abd, chest ct, PFTs  CT chest: "New left upper lobe 5 mm solid nodule, and mild increased size of a 1.6 cm nodular opacity in the right upper lobe."  Pulm consult in AM

## 2024-04-28 NOTE — DISCHARGE NOTE NURSING/CASE MANAGEMENT/SOCIAL WORK - NSDCFUADDAPPT_GEN_ALL_CORE_FT
Please follow up with Dr. Kim on _____ at [facility].     **Please be ~ 30 mins early on the day of your appointment to have a chest xray (script is in your folder) taken on the first floor in radiology PRIOR to going to the CT surgery office.**

## 2024-04-28 NOTE — PROGRESS NOTE ADULT - REASON FOR ADMISSION
Transfer from [facility] for CABG eval

## 2024-04-28 NOTE — PROGRESS NOTE ADULT - PROBLEM SELECTOR PLAN 1
4/18 AVR(T) C3L (LIMA-LAD, SVG-OM, SVG-PDA) with Dr. Cho   -ASA   - Plavix   - Lipitor on hold given hx of intolerance  - Continue Beta blocker  =possible downgrade to 4 tower    Plan to be discussed with CT surgery team on AM rounds
4/18 AVR(T) C3L (LIMA-LAD, SVG-OM, SVG-PDA) with Dr. Cho   -ASA   - Plavix   - Lipitor on hold given hx of intolerance  - Continue Beta blocker  -possible downgrade to 4 tower    Plan to be discussed with CT surgery team on AM rounds
s/p CABG on 4/18 as above  ASA POD-0  Plavix starting POD-1 if chest tube output remains stable  Lipitor starting POD-1 if hepatic function stable  BB when conisistently off pressors and inotropes and as tolerated by HR and BP (currently in slow Afib with preop HR in 40-50's  Early ambulation as able; attempt out of bed to chair POD-1 if hemodynamically stable    Plan to be discussed with CT surgery team on AM rounds
4/18 AVR(T) C3L (LIMA-LAD, SVG-OM, SVG-PDA) with Dr. Cho   -ASA   - Plavix   - Lipitor on hold given hx of intolerance, on PCSK9i q6 months  - Continue Beta blocker  - ERP amio never given due to history of slow Afib pre-op and in OR    Plan to be discussed with CT surgery team on AM rounds
MVD on elective C 4/17, transferred for CABG eval  admit to tele/  no active chest pain  preserved EF per records  Statin intolerance - defer  Continue home Coreg (EOD dosing)  Discuss adding aspirin with team in AM  Preop work up ordered including carotid US to assess for carotid stenosis, PFTs to eval lung function, TTE to eval EF / WMA / Valve function, and lab work including TSH, prealbumin, Hgb A1C, P2Y12, BNP, T&S, MRSA/MSSA, and UA.  4/17 TTE prelim: severe AS, mod AR, mild CA, EF 60%. f/u official read in AM
4/18 AVR(T) C3L (LIMA-LAD, SVG-OM, SVG-PDA) with Dr. Cho   -ASA POD-0  - Plavix started POD-1   - Lipitor on hold given hx of intolerance with discuss on AM rounds  - BB currently being held d/t inotropic support with dobutamine  -wean dobutamine as tolerated  - Early ambulation as able; attempt out of bed to chair daily    Plan to be discussed with CT surgery team on AM rounds
4/18 AVR(T) C3L (LIMA-LAD, SVG-OM, SVG-PDA) with Dr. Cho   -ASA POD-0  - Plavix started POD-1   - Lipitor on hold given hx of intolerance with discuss on AM rounds  - BB as tolerated by HR and BP, will discuss in AM rounds   - Early ambulation as able; attempt out of bed to chair daily    Plan to be discussed with CT surgery team on AM rounds
4/18 AVR(T) C3L (LIMA-LAD, SVG-OM, SVG-PDA) with Dr. Cho   -ASA   - Plavix   - Lipitor on hold given hx of intolerance, on PCSK9i q6 months  - Continue Beta blocker  - ERP amio never given due to history of slow Afib pre-op and in OR    Plan to be discussed with CT surgery team on AM rounds
4/18 AVR(T) C3L (LIMA-LAD, SVG-OM, SVG-PDA) with Dr. Tammie claros for pAF   - Lipitor on hold given hx of intolerance, on PCSK9i q6 months  - Continue Beta blocker  - ERP amio never given due to history of slow Afib pre-op and in OR    Plan to be discussed with CT surgery team on AM rounds

## 2024-04-28 NOTE — PROGRESS NOTE ADULT - PROBLEM SELECTOR PLAN 3
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

## 2024-04-28 NOTE — PROGRESS NOTE ADULT - PROBLEM SELECTOR PROBLEM 3
H/O secondary hypertension

## 2024-04-28 NOTE — DISCHARGE NOTE NURSING/CASE MANAGEMENT/SOCIAL WORK - PATIENT PORTAL LINK FT
You can access the FollowMyHealth Patient Portal offered by [facility] by registering at the following website: [email or web address]. By joining [employer]’s FollowMyHealth portal, you will also be able to view your health information using other applications (apps) compatible with our system.

## 2024-04-28 NOTE — PROGRESS NOTE ADULT - ASSESSMENT
84 male PMHx CAD, HLD, HTN, asthma, COPD, former smoker (4.5 cigars/daily, ~ 50years, quit 2018), Statin intolerance, CKD3 (baseline sCr 1.4), PNA/Empyema, pericarditis and possible adrenal tumor/ pheochromocytoma. In 2019 STEMI and underwent cath which showed moderate TVD and normal EF - no intervention was diagnosed with pericarditis. Has close follow up with his cardiologist and nephrologist for new onset secondary HTN (reportedly SBP 200s). Imaging in 1/2022 noted 8mm left adrenal nodule (repeat 1/2023 showing stable appearance), pheochromocytoma workup indeterminate. MR abdomen cannot rule out neuroendocrine tumor. Was started on beta blocker and diuretics with improvement in blood pressure. Patient with progressive ROJO and intermittent lower extremity edema. Had CTA of coronaries to follow up on progression of his CAD which revealed total calcium score of 3065. Now s/p elective LHC today showing LM and severe TVD. Transfered to [facility] for CABG eval.  Post cath creat 1.6>now 1.65 >1.72>1.93 > 1.58 - finally improving ; Lytes ok  O> In; recommend low dose IVFs, agree with holding diuretics  s/post surgery s/p CABG X4- watch    COPD, moderate. At baseline. No A/E. Denies any chronic ROJO  -Emphysema  -Lung nodules; has been having f/u CTs as outpt at another facility; last one 9/2023 and was stable    - shall follow
84 male PMHx CAD, HLD, HTN, asthma, COPD, former smoker (4.5 cigars/daily, ~ 50years, quit 2018), Statin intolerance, CKD3 (baseline sCr 1.4), PNA/Empyema, pericarditis and possible adrenal tumor/ pheochromocytoma. In 2019 STEMI and underwent cath which showed moderate TVD and normal EF - no intervention was diagnosed with pericarditis. Has close follow up with his cardiologist and nephrologist for new onset secondary HTN (reportedly SBP 200s). Imaging in 1/2022 noted 8mm left adrenal nodule (repeat 1/2023 showing stable appearance), pheochromocytoma workup indeterminate. MR abdomen cannot rule out neuroendocrine tumor. Was started on beta blocker and diuretics with improvement in blood pressure. Patient with progressive ROJO and intermittent lower extremity edema. Had CTA of coronaries to follow up on progression of his CAD which revealed total calcium score of 3065. Now s/p elective LHC today showing LM and severe TVD. Transfered to [facility] for CABG eval.  Post cath creat 1.6>now 1.65 >1.72; Lytes ok  s/post surgery s/p CABG X4- watch    COPD, moderate. At baseline. No A/E. Denies any chronic ROJO  -Emphysema  -Lung nodules; has been having f/u CTs as outpt at another facility; last one 9/2023 and was stable    - shall follow
84 male PMHx CAD, HLD, HTN, asthma, COPD, former smoker (4.5 cigars/daily, ~ 50years, quit 2018), Statin intolerance, CKD3 (baseline sCr 1.4), PNA/Empyema, pericarditis and possible adrenal tumor/ pheochromocytoma. In 2019 STEMI and underwent cath which showed moderate TVD and normal EF - no intervention was diagnosed with pericarditis. Has close follow up with his cardiologist and nephrologist for new onset secondary HTN (reportedly SBP 200s). Imaging in 1/2022 noted 8mm left adrenal nodule (repeat 1/2023 showing stable appearance), pheochromocytoma workup indeterminate. MR abdomen cannot rule out neuroendocrine tumor. Was started on beta blocker and diuretics with improvement in blood pressure. Patient with progressive ROJO and intermittent lower extremity edema. Had CTA of coronaries to follow up on progression of his CAD which revealed total calcium score of 3065. Now s/p elective LHC today showing LM and severe TVD. Transfered to [facility] for CABG eval.  Post cath creat 1.6>now 1.65 >1.72>1.93; Lytes ok  O> In; recommend low dose IVFs, agree with holding diuretics  s/post surgery s/p CABG X4- watch    COPD, moderate. At baseline. No A/E. Denies any chronic ROJO  -Emphysema  -Lung nodules; has been having f/u CTs as outpt at another facility; last one 9/2023 and was stable    - shall follow
84 male PMHx CAD, HLD, HTN, asthma, COPD, former smoker (4.5 cigars/daily, ~ 50years, quit 2018), Statin intolerance, CKD3 (baseline sCr 1.4), PNA/Empyema, pericarditis and possible adrenal tumor/ pheochromocytoma. In 2019 STEMI and underwent cath which showed moderate TVD and normal EF - no intervention was diagnosed with pericarditis. Has close follow up with his cardiologist and nephrologist for new onset secondary HTN (reportedly SBP 200s). Imaging in 1/2022 noted 8mm left adrenal nodule (repeat 1/2023 showing stable appearance), pheochromocytoma workup indeterminate. MR abdomen cannot rule out neuroendocrine tumor. Was started on beta blocker and diuretics with improvement in blood pressure. Patient with progressive ROJO and intermittent lower extremity edema. Had CTA of coronaries to follow up on progression of his CAD which revealed total calcium score of 3065. Now s/p elective LHC today showing LM and severe TVD. Transfered to [facility] for CABG eval.  Post cath creat 1.6>now 1.65 >1.72>1.93 > 1.58 - finally improving>1.21 ; Lytes ok  O> In; recommend low dose IVFs, agree with holding diuretics  s/post surgery s/p CABG X4- watch    COPD, moderate. At baseline. No A/E. Denies any chronic ROJO  -Emphysema  -Lung nodules; has been having f/u CTs as outpt at another facility; last one 9/2023 and was stable    - shall follow
84 male PMHx CAD, HLD, HTN, asthma, COPD, former smoker (4.5 cigars/daily, ~ 50years, quit 2018), Statin intolerance, CKD3 (baseline sCr 1.4), PNA/Empyema, pericarditis and possible adrenal tumor/ pheochromocytoma. In 2019 STEMI and underwent cath which showed moderate TVD and normal EF - no intervention was diagnosed with pericarditis. Has close follow up with his cardiologist and nephrologist for new onset secondary HTN (reportedly SBP 200s). Imaging in 1/2022 noted 8mm left adrenal nodule (repeat 1/2023 showing stable appearance), pheochromocytoma workup indeterminate. MR abdomen cannot rule out neuroendocrine tumor. Was started on beta blocker and diuretics with improvement in blood pressure. Patient with progressive ROJO and intermittent lower extremity edema. Had CTA of coronaries to follow up on progression of his CAD which revealed total calcium score of 3065. Now s/p elective LHC today showing LM and severe TVD. Transfered to [facility] for CABG     CKD III   S/P  bioprosthetic AVR and CABGx3 on 4/18 by Dr. Cho.   UA bland   No hydro on renal sonogram 4/19   Renal function stable 
84 male PMHx CAD, HLD, HTN, asthma, COPD, former smoker (4.5 cigars/daily, ~ 50years, quit 2018), Statin intolerance, CKD3 (baseline sCr 1.4), PNA/Empyema, pericarditis and possible adrenal tumor/ pheochromocytoma. In 2019 STEMI and underwent cath which showed moderate TVD and normal EF - no intervention was diagnosed with pericarditis. Has close follow up with his cardiologist and nephrologist for new onset secondary HTN (reportedly SBP 200s). Imaging in 1/2022 noted 8mm left adrenal nodule (repeat 1/2023 showing stable appearance), pheochromocytoma workup indeterminate. MR abdomen cannot rule out neuroendocrine tumor. Was started on beta blocker and diuretics with improvement in blood pressure. Patient with progressive ROJO and intermittent lower extremity edema. Had CTA of coronaries to follow up on progression of his CAD which revealed total calcium score of 3065. Now s/p elective LHC today showing LM and severe TVD. Transfered to [facility] for CABG eval.  Post cath creat 1.6>now 1.65 post surgery s/p CABG X4- watch    COPD, moderate. At baseline. No A/E. Denies any chronic ROJO  -Emphysema  -Lung nodules; has been having f/u CTs as outpt at another facility; last one 9/2023 and was stable    - shall follow
84 male PMHx CAD, HLD, HTN, asthma, COPD, former smoker (4.5 cigars/daily, ~ 50years, quit 2018), Statin intolerance, CKD3 (baseline sCr 1.4), PNA/Empyema, pericarditis and possible adrenal tumor/ pheochromocytoma. In 2019 STEMI and underwent cath which showed moderate TVD and normal EF - no intervention was diagnosed with pericarditis. Has close follow up with his cardiologist and nephrologist for new onset secondary HTN (reportedly SBP 200s). Imaging in 1/2022 noted 8mm left adrenal nodule (repeat 1/2023 showing stable appearance), pheochromocytoma workup indeterminate. MR abdomen cannot rule out neuroendocrine tumor. Was started on beta blocker and diuretics with improvement in blood pressure. Patient with progressive ROJO and intermittent lower extremity edema. Had CTA of coronaries to follow up on progression of his CAD which revealed total calcium score of 3065. Now s/p elective LHC today showing LM and severe TVD. Transfered to [facility] for CABG     CKD stage  III   S/P  bioprosthetic AVR and CABGx3 on 4/18 by Dr. Cho.   UA bland   No hydro on renal sonogram 4/19   Renal function electrolytes remain stable    Will follow PRN
84M with hx of CAD, HLD, HTN, asthma, COPD, former smoker (4.5 cigars/daily, ~ 50years, quit 2018), Statin intolerance, CKD3 (baseline sCr 1.4), PNA/Empyema, pericarditis and possible adrenal tumor/ pheochromocytoma with progressively worsening dyspnea and lower extremity edema found to have TVD and underwent CABG 4/18 with post operative course complicated by hypoxic respiratory failure requiring high flow now improved    Post operative hypoxic respiratory failure, improving   likely multifactorial secondary to volume overload due to multiple transfusion requirement intraop, underlying emphysema, atelectasis/effusions and now with noted hemothorax  c/w diuresis for net even as needed   continue with Duoneb  can increase Symbicort to 160mcg   completed short course of steroids and no current bronchospasm  panculture and RVP if recurrent fever  incentive spirometry / aerobika  aspiration precautions   OOB as tolerated   wean FiO2 as tolerated for goal >88-92%; home O2 arranged   f/u w/ Dr. Solorio outpatient on discharge   repeat CT chest for interval f/u outpatient 
CAD s/p CABG 4/18/24  Moderate COPD no in exacerbation  Former smoker  Stable pulmonary nodules    Recommendations:  ATC + PRN nebs. OOBTC. Frequent incentive spirometry. PT per primary team. Pain control. He appears stable from a pulmonary standpoint post-operatively.      Jarred Yeung MD, [facility]  , Pulmonary & Critical Care Medicine  [facility]  Pulmonary and Sleep Medicine at [place]  [address]  [place] [address]  T: [phone]  F: [phone]
84M with hx of CAD, HLD, HTN, asthma, COPD, former smoker (4.5 cigars/daily, ~ 50years, quit 2018), Statin intolerance, CKD3 (baseline sCr 1.4), PNA/Empyema, pericarditis and possible adrenal tumor/ pheochromocytoma with progressively worsening dyspnea and lower extremity edema found to have TVD and underwent CABG 4/18 with post operative course complicated by hypoxic respiratory failure requiring high flow now improved    Post operative hypoxic respiratory failure, improving   likely multifactorial secondary to volume overload due to multiple transfusion requirement intraop, underlying emphysema, atelectasis/effusions   c/w diuresis for net even as needed   continue with duoneb/symbicort   completed short course of steroids   panculture and RVP if recurrent fever  incentive spirometry   aspiration precautions   chest tubes per CT surgery   OOB as tolerated   wean FiO2 as tolerated for goal >88-92%   f/u w/ Dr. Solorio outpatient on discharge   
84 male PMHx CAD, HLD, HTN, asthma, COPD, former smoker (4.5 cigars/daily, ~ 50years, quit 2018), Statin intolerance, CKD3 (baseline sCr 1.4), PNA/Empyema, pericarditis and possible adrenal tumor/ pheochromocytoma. In 2019 STEMI and underwent cath which showed moderate TVD and normal EF - no intervention was diagnosed with pericarditis. Has close follow up with his cardiologist and nephrologist for new onset secondary HTN (reportedly SBP 200s). Imaging in 1/2022 noted 8mm left adrenal nodule (repeat 1/2023 showing stable appearance), pheochromocytoma workup indeterminate. MR abdomen cannot rule out neuroendocrine tumor. Was started on beta blocker and diuretics with improvement in blood pressure. Patient with progressive ROJO and intermittent lower extremity edema. Had CTA of coronaries to follow up on progression of his CAD which revealed total calcium score of 3065. LHC then revealed LM and severe TVD. Transfered to [facility] for CABG eval on 4/17. Renal clearance for possible pheochromocytoma obtained; patient now s/p CABGx3 on 4/18 with Dr. Cho. Intraoperative course with 2x PRBC, 3x PLT, 10 cryo; post op course with elevated chest tube output requiring additional 4x PRBC, 2x FFP, 500 feiba and lactic acidosis.  Post extubation respiratory support provided via High FLow nasal cannula, now weaned off to low flow oxygen via NC.  
CAD s/p CABG 4/18/24  Moderate COPD not in exacerbation  Former smoker  Stable pulmonary nodules  Hypoxia - improved    Recommendations:  ATC + PRN nebs. OOBTC. Frequent incentive spirometry. PT per primary team. Pain control. He appears stable from a pulmonary standpoint post-operatively. O2 requirements have improved.      Jarred Yeung MD, [facility]  , Pulmonary & Critical Care Medicine  [facility]  Pulmonary and Sleep Medicine at [place]  [address]  [place] [address]  T: [phone]  F: [phone]
84 male PMHx CAD, HLD, HTN, asthma, COPD, former smoker (4.5 cigars/daily, ~ 50years, quit 2018), Statin intolerance, CKD3 (baseline sCr 1.4), PNA/Empyema, pericarditis and possible adrenal tumor/ pheochromocytoma. In 2019 STEMI and underwent cath which showed moderate TVD and normal EF - no intervention was diagnosed with pericarditis. Has close follow up with his cardiologist and nephrologist for new onset secondary HTN (reportedly SBP 200s). Imaging in 1/2022 noted 8mm left adrenal nodule (repeat 1/2023 showing stable appearance), pheochromocytoma workup indeterminate. MR abdomen cannot rule out neuroendocrine tumor. Was started on beta blocker and diuretics with improvement in blood pressure. Patient with progressive ROJO and intermittent lower extremity edema. Had CTA of coronaries to follow up on progression of his CAD which revealed total calcium score of 3065. Now s/p elective LHC today showing LM and severe TVD. Transfered to [facility] for CABG eval on 4/17. Renal clearance for possible pheochromocytoma obtained; patient now s/p CABGx3 on 4/18 with Dr. Cho. Intraoperative course with 2x PRBC, 3x PLT, 10 cryo; post op course with elevated chest tube output requiring additional 4x PRBC, 2x FFP, 500 feiba and lactic acidosis. 
84 male PMHx CAD, HLD, HTN, asthma, COPD, former smoker (4.5 cigars/daily, ~ 50years, quit 2018), Statin intolerance, CKD3 (baseline sCr 1.4), PNA/Empyema, pericarditis and possible adrenal tumor/ pheochromocytoma. In 2019 STEMI and underwent cath which showed moderate TVD and normal EF - no intervention was diagnosed with pericarditis. Has close follow up with his cardiologist and nephrologist for new onset secondary HTN (reportedly SBP 200s). Imaging in 1/2022 noted 8mm left adrenal nodule (repeat 1/2023 showing stable appearance), pheochromocytoma workup indeterminate. MR abdomen cannot rule out neuroendocrine tumor. Was started on beta blocker and diuretics with improvement in blood pressure. Patient with progressive ROJO and intermittent lower extremity edema. Had CTA of coronaries to follow up on progression of his CAD which revealed total calcium score of 3065. Now s/p elective LHC today showing LM and severe TVD. Transfered to [facility] for CABG eval.
84 male PMHx CAD, HLD, HTN, asthma, COPD, former smoker (4.5 cigars/daily, ~ 50years, quit 2018), Statin intolerance, CKD3 (baseline sCr 1.4), PNA/Empyema, pericarditis and possible adrenal tumor/ pheochromocytoma. In 2019 STEMI and underwent cath which showed moderate TVD and normal EF - no intervention was diagnosed with pericarditis. Has close follow up with his cardiologist and nephrologist for new onset secondary HTN (reportedly SBP 200s). Imaging in 1/2022 noted 8mm left adrenal nodule (repeat 1/2023 showing stable appearance), pheochromocytoma workup indeterminate. MR abdomen cannot rule out neuroendocrine tumor. Was started on beta blocker and diuretics with improvement in blood pressure. Patient with progressive ROJO and intermittent lower extremity edema. Had CTA of coronaries to follow up on progression of his CAD which revealed total calcium score of 3065. LHC then revealed LM and severe TVD. Transfered to [facility] for CABG eval on 4/17. Renal clearance for possible pheochromocytoma obtained; patient now s/p CABGx3 on 4/18 with Dr. Cho. Intraoperative course with 2x PRBC, 3x PLT, 10 cryo; post op course with elevated chest tube output requiring additional 4x PRBC, 2x FFP, 500 feiba and lactic acidosis.  Post extubation respiratory support provided via High FLow nasal cannula, now weaned off to low flow oxygen via NC.  
84 male PMHx CAD, HLD, HTN, asthma, COPD, former smoker (4.5 cigars/daily, ~ 50years, quit 2018), Statin intolerance, CKD3 (baseline sCr 1.4), PNA/Empyema, pericarditis and possible adrenal tumor/ pheochromocytoma. In 2019 STEMI and underwent cath which showed moderate TVD and normal EF - no intervention was diagnosed with pericarditis. Has close follow up with his cardiologist and nephrologist for new onset secondary HTN (reportedly SBP 200s). Imaging in 1/2022 noted 8mm left adrenal nodule (repeat 1/2023 showing stable appearance), pheochromocytoma workup indeterminate. MR abdomen cannot rule out neuroendocrine tumor. Was started on beta blocker and diuretics with improvement in blood pressure. Patient with progressive ROJO and intermittent lower extremity edema. Had CTA of coronaries to follow up on progression of his CAD which revealed total calcium score of 3065. LHC then revealed LM and severe TVD. Transfered to [facility] for CABG eval on 4/17. Renal clearance for possible pheochromocytoma obtained; patient now s/p CABGx3 on 4/18 with Dr. Cho. Intraoperative course with 2x PRBC, 3x PLT, 10 cryo; post op course with elevated chest tube output requiring additional 4x PRBC, 2x FFP, 500 feiba and lactic acidosis.  Post extubation respiratory support provided via High FLow nasal cannula, now weaned off to low flow oxygen via NC.  
84 male PMHx CAD, HLD, HTN, asthma, COPD, former smoker (4.5 cigars/daily, ~ 50years, quit 2018), Statin intolerance, CKD3 (baseline sCr 1.4), PNA/Empyema, pericarditis and possible adrenal tumor/ pheochromocytoma. In 2019 STEMI and underwent cath which showed moderate TVD and normal EF - no intervention was diagnosed with pericarditis. Has close follow up with his cardiologist and nephrologist for new onset secondary HTN (reportedly SBP 200s). Imaging in 1/2022 noted 8mm left adrenal nodule (repeat 1/2023 showing stable appearance), pheochromocytoma workup indeterminate. MR abdomen cannot rule out neuroendocrine tumor. Was started on beta blocker and diuretics with improvement in blood pressure. Patient with progressive ROJO and intermittent lower extremity edema. Had CTA of coronaries to follow up on progression of his CAD which revealed total calcium score of 3065. Now s/p elective LHC today showing LM and severe TVD. Transfered to [facility] for CABG eval on 4/17. Renal clearance for possible pheochromocytoma obtained; patient now s/p CABGx3 on 4/18 with Dr. Cho. Intraoperative course with 2x PRBC, 3x PLT, 10 cryo; post op course with elevated chest tube output requiring additional 4x PRBC, 2x FFP, 500 feiba and lactic acidosis. 
84 male PMHx CAD, HLD, HTN, asthma, COPD, former smoker (4.5 cigars/daily, ~ 50years, quit 2018), Statin intolerance, CKD3 (baseline sCr 1.4), PNA/Empyema, pericarditis and possible adrenal tumor/ pheochromocytoma. In 2019 STEMI and underwent cath which showed moderate TVD and normal EF - no intervention was diagnosed with pericarditis. Has close follow up with his cardiologist and nephrologist for new onset secondary HTN (reportedly SBP 200s). Imaging in 1/2022 noted 8mm left adrenal nodule (repeat 1/2023 showing stable appearance), pheochromocytoma workup indeterminate. MR abdomen cannot rule out neuroendocrine tumor. Was started on beta blocker and diuretics with improvement in blood pressure. Patient with progressive ROJO and intermittent lower extremity edema. Had CTA of coronaries to follow up on progression of his CAD which revealed total calcium score of 3065. LHC then revealed LM and severe TVD. Transfered to [facility] for CABG eval on 4/17. Renal clearance for possible pheochromocytoma obtained; patient now s/p CABGx3 on 4/18 with Dr. Cho. Intraoperative course with 2x PRBC, 3x PLT, 10 cryo; post op course with elevated chest tube output requiring additional 4x PRBC, 2x FFP, 500 feiba and lactic acidosis.  Post extubation respiratory support provided via High FLow nasal cannula, now weaned off to low flow oxygen via NC.  
84 male PMHx CAD, HLD, HTN, asthma, COPD, former smoker (4.5 cigars/daily, ~ 50years, quit 2018), Statin intolerance, CKD3 (baseline sCr 1.4), PNA/Empyema, pericarditis and possible adrenal tumor/ pheochromocytoma. In 2019 STEMI and underwent cath which showed moderate TVD and normal EF - no intervention was diagnosed with pericarditis. Has close follow up with his cardiologist and nephrologist for new onset secondary HTN (reportedly SBP 200s). Imaging in 1/2022 noted 8mm left adrenal nodule (repeat 1/2023 showing stable appearance), pheochromocytoma workup indeterminate. MR abdomen cannot rule out neuroendocrine tumor. Was started on beta blocker and diuretics with improvement in blood pressure. Patient with progressive ROJO and intermittent lower extremity edema. Had CTA of coronaries to follow up on progression of his CAD which revealed total calcium score of 3065. LHC then revealed LM and severe TVD. Transfered to [facility] for CABG eval on 4/17. Renal clearance for possible pheochromocytoma obtained; patient now s/p CABGx3 on 4/18 with Dr. Cho. Intraoperative course with 2x PRBC, 3x PLT, 10 cryo; post op course with elevated chest tube output requiring additional 4x PRBC, 2x FFP, 500 feiba and lactic acidosis.  Post extubation respiratory support provided via High FLow nasal cannula, now weaned off to low flow oxygen via NC.  
84 male PMHx CAD, HLD, HTN, asthma, COPD, former smoker (4.5 cigars/daily, ~ 50years, quit 2018), Statin intolerance, CKD3 (baseline sCr 1.4), PNA/Empyema, pericarditis and possible adrenal tumor/ pheochromocytoma. In 2019 STEMI and underwent cath which showed moderate TVD and normal EF - no intervention was diagnosed with pericarditis. Has close follow up with his cardiologist and nephrologist for new onset secondary HTN (reportedly SBP 200s). Imaging in 1/2022 noted 8mm left adrenal nodule (repeat 1/2023 showing stable appearance), pheochromocytoma workup indeterminate. MR abdomen cannot rule out neuroendocrine tumor. Was started on beta blocker and diuretics with improvement in blood pressure. Patient with progressive ROJO and intermittent lower extremity edema. Had CTA of coronaries to follow up on progression of his CAD which revealed total calcium score of 3065. Now s/p elective LHC today showing LM and severe TVD. Transfered to [facility] for CABG eval on 4/17. Renal clearance for possible pheochromocytoma obtained; patient now s/p CABGx3 on 4/18 with Dr. Cho. Intraoperative course with 2x PRBC, 3x PLT, 10 cryo; post op course with elevated chest tube output requiring additional 4x PRBC, 2x FFP, 500 feiba and lactic acidosis.  Post extubation respiratory support provided via High FLow nasal cannula, now weaned off to low flow oxygen via NC.  
84 male PMHx CAD, HLD, HTN, asthma, COPD, former smoker (4.5 cigars/daily, ~ 49years, quit 2018), Statin intolerance, CKD3 (baseline sCr 1.4), PNA/Empyema, pericarditis and possible adrenal tumor/ pheochromocytoma. In 2019 STEMI and underwent cath which showed moderate TVD and normal EF - no intervention was diagnosed with pericarditis. Has close follow up with his cardiologist and nephrologist for new onset secondary HTN (reportedly SBP 200s). Imaging in 1/2022 noted 8mm left adrenal nodule (repeat 1/2023 showing stable appearance), pheochromocytoma workup indeterminate. MR abdomen cannot rule out neuroendocrine tumor. Was started on beta blocker and diuretics with improvement in blood pressure. Patient with progressive ROJO and intermittent lower extremity edema. Had CTA of coronaries to follow up on progression of his CAD which revealed total calcium score of 3065. Now s/p elective LHC today showing LM and severe TVD. Transfered to [facility] for CABG eval on 4/17. Renal clearance for possible pheochromocytoma obtained; patient now s/p CABGx3 on 4/18 with Dr. Cho. Intraoperative course with 2x PRBC, 3x PLT, 10 cryo; post op course with elevated chest tube output requiring additional 4x PRBC, 2x FFP, 500 feiba and lactic acidosis.  Post extubation respiratory support provided via High FLow nasal cannula, now weaned off to low flow oxygen via NC.  
84 male PMHx CAD, HLD, HTN, asthma, COPD, former smoker (4.5 cigars/daily, ~ 50years, quit 2018), Statin intolerance, CKD3 (baseline sCr 1.4), PNA/Empyema, pericarditis and possible adrenal tumor/ pheochromocytoma. In 2019 STEMI and underwent cath which showed moderate TVD and normal EF - no intervention was diagnosed with pericarditis. Has close follow up with his cardiologist and nephrologist for new onset secondary HTN (reportedly SBP 200s). Imaging in 1/2022 noted 8mm left adrenal nodule (repeat 1/2023 showing stable appearance), pheochromocytoma workup indeterminate. MR abdomen cannot rule out neuroendocrine tumor. Was started on beta blocker and diuretics with improvement in blood pressure. Patient with progressive ROJO and intermittent lower extremity edema. Had CTA of coronaries to follow up on progression of his CAD which revealed total calcium score of 3065. Now s/p elective LHC today showing LM and severe TVD. Transfered to [facility] for CABG eval on 4/17. Renal clearance for possible pheochromocytoma obtained; patient now s/p CABGx3 on 4/18 with Dr. Cho. Intraoperative course with 2x PRBC, 3x PLT, 10 cryo; post op course with elevated chest tube output requiring additional 4x PRBC, 2x FFP, 500 feiba and lactic acidosis.  Post extubation respiratory support provided via High FLow nasal cannula, now weaned off to low flow oxygen via NC.

## 2024-04-28 NOTE — PROGRESS NOTE ADULT - SUBJECTIVE AND OBJECTIVE BOX
ART BLACK  MRN-[id number]    HPI:  84 male PMHx CAD, HLD, HTN, asthma, COPD, former smoker (4.5 cigars/daily, ~ 50years, quit 2018), Statin intolerance, CKD3 (baseline sCr 1.4), PNA/Empyema, pericarditis and possible adrenal tumor/ pheochromocytoma. In 2019 STEMI and underwent cath which showed moderate TVD and normal EF - no intervention was diagnosed with pericarditis. Has close follow up with his cardiologist and nephrologist for new onset secondary HTN (reportedly SBP 200s). Imaging in 1/2022 noted 8mm left adrenal nodule (repeat 1/2023 showing stable appearance), pheochromocytoma workup indeterminate. MR abdomen cannot rule out neuroendocrine tumor. Was started on beta blocker and diuretics with improvement in blood pressure. Patient with progressive ROJO and intermittent lower extremity edema. Had CTA of coronaries to follow up on progression of his CAD which revealed total calcium score of 3065. Now s/p elective LHC today showing LM and severe TVD. Transfered to [facility] for CABG eval.  Patient denies any chest pain, palpitations, HA, abd pain, n/v, dizziness, diaphoresis, syncope, changes in bowel bladder function, weight gain/loss. States "normal health."   (17 Apr 2024 18:30)      Surgery/Hospital Course:  PROCEDURES:  CABG, 2-3 vessels, with aortic valve replacement 18-Apr-2024     POST-OP DIAGNOSIS:  CAD (coronary artery disease)   Aortic stenosis     PRE-OP DIAGNOSIS:  CAD (coronary artery disease)   AS (aortic stenosis)     -  Today:  No acute events-    ICU Vital Signs Last 24 Hrs  T(C): 36.3 (22 Apr 2024 08:55), Max: 36.7 (22 Apr 2024 00:00)  T(F): 97.4 (22 Apr 2024 08:55), Max: 98.1 (22 Apr 2024 00:00)  HR: 62 (22 Apr 2024 11:00) (58 - 93)  BP: 103/59 (22 Apr 2024 10:00) (103/59 - 149/95)  BP(mean): 73 (22 Apr 2024 10:00) (73 - 111)  ABP: --  ABP(mean): --  RR: 15 (22 Apr 2024 11:00) (12 - 29)  SpO2: 96% (22 Apr 2024 11:00) (91% - 100%)    O2 Parameters below as of 22 Apr 2024 08:52  Patient On (Oxygen Delivery Method): nasal cannula, 5            Physical Exam:  Gen: A&O   CNS: non focal 	  Neck: no JVD  RES : clear , no wheezing              CVS: Regular  rhythm. Normal S1/S2  Abd: Soft, non-distended. Bowel sounds present.  Skin: No rash.  Ext:  no edema    ============================I/O===========================   I&O's Detail    21 Apr 2024 07:01  -  22 Apr 2024 07:00  --------------------------------------------------------  IN:    DOBUTamine: 14 mL    Oral Fluid: 720 mL  Total IN: 734 mL    OUT:    Chest Tube (mL): 10 mL    Chest Tube (mL): 190 mL    Indwelling Catheter - Urethral (mL): 3760 mL  Total OUT: 3960 mL    Total NET: -3226 mL      22 Apr 2024 07:01  -  22 Apr 2024 11:23  --------------------------------------------------------  IN:    Oral Fluid: 250 mL  Total IN: 250 mL    OUT:    Chest Tube (mL): 50 mL    Indwelling Catheter - Urethral (mL): 235 mL  Total OUT: 285 mL    Total NET: -35 mL        ============================ LABS =========================                        11.8   15.79 )-----------( 89       ( 22 Apr 2024 02:40 )             34.1     04-22    136  |  100  |  54.1<H>  ----------------------------<  125<H>  4.2   |  26.0  |  1.58<H>    Ca    8.0<L>      22 Apr 2024 02:40  Mg     2.4     04-22            Urinalysis Basic - ( 22 Apr 2024 02:40 )    Color: x / Appearance: x / SG: x / pH: x  Gluc: 125 mg/dL / Ketone: x  / Bili: x / Urobili: x   Blood: x / Protein: x / Nitrite: x   Leuk Esterase: x / RBC: x / WBC x   Sq Epi: x / Non Sq Epi: x / Bacteria: x      ======================Micro/Rad/Cardio=================  Culture: Reviewed   CXR: Reviewed  Echo:Reviewed  ======================================================  PAST MEDICAL & SURGICAL HISTORY:  CAD (coronary artery disease)      HLD (hyperlipidemia)      H/O secondary hypertension      Asthma      History of COPD      Former smoker      Statin intolerance      Stage 3 chronic kidney disease      PNA (pneumonia)      Empyema        ====================ASSESSMENT ==============  84 male PMHx CAD, HLD, HTN, asthma, COPD, former smoker (4.5 cigars/daily, ~ 50years, quit 2018), Statin intolerance, CKD3 (baseline sCr 1.4), PNA/Empyema, pericarditis and possible adrenal tumor/ pheochromocytoma. In 2019 STEMI and underwent cath which showed moderate TVD and normal EF - no intervention was diagnosed with pericarditis. Has close follow up with his cardiologist and nephrologist for new onset secondary HTN (reportedly SBP 200s). Imaging in 1/2022 noted 8mm left adrenal nodule (repeat 1/2023 showing stable appearance), pheochromocytoma workup indeterminate. MR abdomen cannot rule out neuroendocrine tumor. Was started on beta blocker and diuretics with improvement in blood pressure. Patient with progressive ROJO and intermittent lower extremity edema. Had CTA of coronaries to follow up on progression of his CAD which revealed total calcium score of 3065. Now s/p elective LHC today showing LM and severe TVD.    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   ---Post op Hypovolemia  ---Post op respiratory insufficiency       Plan:-  -ASA   -Continue Plavix  -BB  as tolerated by HR and BP.  -Adjust meds pr CrCl  -Avoid nephrotoxic agents  -Continue GI ppx with Protonix  -SCDs while on bedrest  -DVT prophylaxis   -DC Left chest tube-      ====================== NEUROLOGY=====================  acetaminophen     Tablet .. 650 milliGRAM(s) Oral every 6 hours PRN Mild Pain (1 - 3)  gabapentin 100 milliGRAM(s) Oral every 8 hours  melatonin 5 milliGRAM(s) Oral at bedtime  oxyCODONE    IR 10 milliGRAM(s) Oral every 4 hours PRN Severe Pain (7 - 10)  oxyCODONE    IR 5 milliGRAM(s) Oral every 4 hours PRN Moderate Pain (4 - 6)    ==================== RESPIRATORY======================  Post op respiratory insufficiency  albuterol/ipratropium for Nebulization 3 milliLiter(s) Nebulizer every 6 hours  albuterol/ipratropium for Nebulization 3 milliLiter(s) Nebulizer every 6 hours PRN Shortness of Breath  budesonide  80 MICROgram(s)/formoterol 4.5 MICROgram(s) Inhaler 2 Puff(s) Inhalation two times a day    ====================CARDIOVASCULAR==================  Post op Hypovolemia  metoprolol tartrate 12.5 milliGRAM(s) Oral two times a day    ===================HEMATOLOGIC/ONC ===================  Monitor H&H/Plts    aspirin enteric coated 325 milliGRAM(s) Oral daily  clopidogrel Tablet 75 milliGRAM(s) Oral daily    ===================== RENAL =========================  Continue monitoring urine output, I&OS, BUN/Cr     ==================== GASTROINTESTINAL===================  ascorbic acid 500 milliGRAM(s) Oral two times a day  bisacodyl Suppository 10 milliGRAM(s) Rectal once  lactulose Syrup 20 Gram(s) Oral once  pantoprazole    Tablet 40 milliGRAM(s) Oral daily  polyethylene glycol 3350 17 Gram(s) Oral daily  senna 2 Tablet(s) Oral at bedtime  sodium chloride 0.9%. 1000 milliLiter(s) (10 mL/Hr) IV Continuous <Continuous>  sodium chloride 0.9%. 1000 milliLiter(s) (5 mL/Hr) IV Continuous <Continuous>    =======================    ENDOCRINE  =====================  insulin lispro (ADMELOG) corrective regimen sliding scale   SubCutaneous Before meals and at bedtime    ========================INFECTIOUS DISEASE================      -Monitor Neurologic status ,   -Head of the bed should remain elevated to 45 degrees,  -Monitor for arrhythmias and monitor parameters for organ perfusion,  -Glycemic control is satisfactory,  -Nutritional goals will be met using po eventually , insure adequate caloric intake and monitor the same ,  -Electrolytes have been repleted as necessary , pain control has been achieved  and wound care has been carried out ,  -Stress ulcer and VTE prophylaxis will be achieved,  -Agressive PT and early mobility and ambulation goals will be met,      I have spent 35 minutes providing acute care for this critically ill patient     Patient requires continuous monitoring with bedside rhythm monitoring, pulse ox monitoring, and intermittent blood gas analysis. Care plan discussed with ICU care team. Patient remained critical and at risk for life threatening decompensation.           
ART BLACK  MRN-[id number]    HPI:  84 male PMHx CAD, HLD, HTN, asthma, COPD, former smoker (4.5 cigars/daily, ~ 50years, quit 2018), Statin intolerance, CKD3 (baseline sCr 1.4), PNA/Empyema, pericarditis and possible adrenal tumor/ pheochromocytoma. In 2019 STEMI and underwent cath which showed moderate TVD and normal EF - no intervention was diagnosed with pericarditis. Has close follow up with his cardiologist and nephrologist for new onset secondary HTN (reportedly SBP 200s). Imaging in 1/2022 noted 8mm left adrenal nodule (repeat 1/2023 showing stable appearance), pheochromocytoma workup indeterminate. MR abdomen cannot rule out neuroendocrine tumor. Was started on beta blocker and diuretics with improvement in blood pressure. Patient with progressive ROJO and intermittent lower extremity edema. Had CTA of coronaries to follow up on progression of his CAD which revealed total calcium score of 3065. Now s/p elective LHC today showing LM and severe TVD. Transfered to [facility] for CABG eval.  Patient denies any chest pain, palpitations, HA, abd pain, n/v, dizziness, diaphoresis, syncope, changes in bowel bladder function, weight gain/loss. States "normal health."   (17 Apr 2024 18:30)      Surgery/Hospital Course:  PROCEDURES:  CABG, 2-3 vessels, with aortic valve replacement 18-Apr-2024     POST-OP DIAGNOSIS:  CAD (coronary artery disease)   Aortic stenosis     PRE-OP DIAGNOSIS:  CAD (coronary artery disease)   AS (aortic stenosis)     -  Today:  No acute events-  --CT Chest No Cont (04.24.24)  Left anterior fluid collection measuring 12 x 8 x 3 cm, which is likely   extrapleural in anatomic location and contains hemorrhage.    Small left hemothorax.    Small right pleural effusion.    Interval aortic valve replacement and CABG with associated small   circumferential hemopericardium and hematoma tracking along the anterior   mediastinal fat.      ICU Vital Signs Last 24 Hrs  T(C): 36.6 (24 Apr 2024 12:00), Max: 37.1 (24 Apr 2024 04:00)  T(F): 97.8 (24 Apr 2024 12:00), Max: 98.8 (24 Apr 2024 04:00)  HR: 86 (24 Apr 2024 12:00) (65 - 86)  BP: 99/72 (24 Apr 2024 12:00) (98/67 - 137/70)  BP(mean): 81 (24 Apr 2024 12:00) (75 - 98)  ABP: --  ABP(mean): --  RR: 28 (24 Apr 2024 12:00) (12 - 31)  SpO2: 95% (24 Apr 2024 12:00) (92% - 99%)    O2 Parameters below as of 24 Apr 2024 12:00  Patient On (Oxygen Delivery Method): nasal cannula  O2 Flow (L/min): 2          Physical Exam:  Gen: A&O   CNS: non focal 	  Neck: no JVD  RES : clear , no wheezing              CVS: Regular  rhythm. Normal S1/S2  Abd: Soft, non-distended. Bowel sounds present.  Skin: No rash.  Ext:  no edema    ============================I/O===========================   I&O's Detail    23 Apr 2024 07:01  -  24 Apr 2024 07:00  --------------------------------------------------------  IN:    Oral Fluid: 1673 mL  Total IN: 1673 mL    OUT:    Indwelling Catheter - Urethral (mL): 85 mL    Voided (mL): 1200 mL  Total OUT: 1285 mL    Total NET: 388 mL      24 Apr 2024 07:01  -  24 Apr 2024 12:15  --------------------------------------------------------  IN:    Oral Fluid: 480 mL  Total IN: 480 mL    OUT:    Voided (mL): 200 mL  Total OUT: 200 mL    Total NET: 280 mL        ============================ LABS =========================                        12.0   13.97 )-----------( 115      ( 24 Apr 2024 04:10 )             36.1     04-24    137  |  101  |  39.7<H>  ----------------------------<  98  4.6   |  25.0  |  1.12    Ca    7.8<L>      24 Apr 2024 04:10  Mg     2.4     04-24            Urinalysis Basic - ( 24 Apr 2024 04:10 )    Color: x / Appearance: x / SG: x / pH: x  Gluc: 98 mg/dL / Ketone: x  / Bili: x / Urobili: x   Blood: x / Protein: x / Nitrite: x   Leuk Esterase: x / RBC: x / WBC x   Sq Epi: x / Non Sq Epi: x / Bacteria: x      ======================Micro/Rad/Cardio=================  Culture: Reviewed   CXR: Reviewed  Echo:Reviewed  ======================================================  PAST MEDICAL & SURGICAL HISTORY:  CAD (coronary artery disease)      HLD (hyperlipidemia)      H/O secondary hypertension      Asthma      History of COPD      Former smoker      Statin intolerance      Stage 3 chronic kidney disease      PNA (pneumonia)      Empyema        ====================ASSESSMENT ==============  84 male PMHx CAD, HLD, HTN, asthma, COPD, former smoker (4.5 cigars/daily, ~ 50years, quit 2018), Statin intolerance, CKD3 (baseline sCr 1.4), PNA/Empyema, pericarditis and possible adrenal tumor/ pheochromocytoma. In 2019 STEMI and underwent cath which showed moderate TVD and normal EF - no intervention was diagnosed with pericarditis. Has close follow up with his cardiologist and nephrologist for new onset secondary HTN (reportedly SBP 200s). Imaging in 1/2022 noted 8mm left adrenal nodule (repeat 1/2023 showing stable appearance), pheochromocytoma workup indeterminate. MR abdomen cannot rule out neuroendocrine tumor. Was started on beta blocker and diuretics with improvement in blood pressure. Patient with progressive ROJO and intermittent lower extremity edema. Had CTA of coronaries to follow up on progression of his CAD which revealed total calcium score of 3065. Now s/p elective LHC today showing LM and severe TVD.    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   ---Post op Hypovolemia  ---Post op respiratory insufficiency       Plan:-  -ContinueASA   -Continue Plavix  -Continue BB  as tolerated by HR and BP.  -Adjust meds pr CrCl  -Avoid nephrotoxic agents  -Continue GI ppx with Protonix  -SCDs while on bedrest  -Continue DVT prophylaxis   -CT scan chest today    ====================== NEUROLOGY=====================  acetaminophen     Tablet .. 650 milliGRAM(s) Oral every 6 hours PRN Mild Pain (1 - 3)  melatonin 5 milliGRAM(s) Oral at bedtime  oxyCODONE    IR 10 milliGRAM(s) Oral every 4 hours PRN Severe Pain (7 - 10)  oxyCODONE    IR 5 milliGRAM(s) Oral every 4 hours PRN Moderate Pain (4 - 6)    ==================== RESPIRATORY======================  Post op respiratory insufficiency  albuterol/ipratropium for Nebulization 3 milliLiter(s) Nebulizer every 6 hours PRN Shortness of Breath  albuterol/ipratropium for Nebulization 3 milliLiter(s) Nebulizer every 6 hours  budesonide 160 MICROgram(s)/formoterol 4.5 MICROgram(s) Inhaler 2 Puff(s) Inhalation two times a day    ====================CARDIOVASCULAR==================  Post op Hypovolemia  metoprolol tartrate 12.5 milliGRAM(s) Oral two times a day    ===================HEMATOLOGIC/ONC ===================  Monitor H&H/Plts    aspirin enteric coated 325 milliGRAM(s) Oral daily  clopidogrel Tablet 75 milliGRAM(s) Oral daily  heparin   Injectable 5000 Unit(s) SubCutaneous every 8 hours    ===================== RENAL =========================  Continue monitoring urine output, I&OS, BUN/Cr     ==================== GASTROINTESTINAL===================  bisacodyl Suppository 10 milliGRAM(s) Rectal once  pantoprazole    Tablet 40 milliGRAM(s) Oral daily  polyethylene glycol 3350 17 Gram(s) Oral daily  senna 2 Tablet(s) Oral at bedtime    =======================    ENDOCRINE  =====================    ========================INFECTIOUS DISEASE================      -Monitor Neurologic status ,   -Head of the bed should remain elevated to 45 degrees,  -Monitor for arrhythmias and monitor parameters for organ perfusion,  -Glycemic control is satisfactory,  -Nutritional goals will be met using po eventually , insure adequate caloric intake and monitor the same ,  -Electrolytes have been repleted as necessary , pain control has been achieved  and wound care has been carried out ,  -Stress ulcer and VTE prophylaxis will be achieved,  -Agressive PT and early mobility and ambulation goals will be met,    I have spent 35 minutes providing acute care for this critically ill patient     Patient requires continuous monitoring with bedside rhythm monitoring, pulse ox monitoring, and intermittent blood gas analysis. Care plan discussed with ICU care team. Patient remained critical and at risk for life threatening decompensation.           
Patient seen and examined    I&O's Summary    22 Apr 2024 07:01  -  23 Apr 2024 07:00  --------------------------------------------------------  IN: 1015 mL / OUT: 1665 mL / NET: -650 mL    23 Apr 2024 07:01  -  23 Apr 2024 19:16  --------------------------------------------------------  IN: 773 mL / OUT: 935 mL / NET: -162 mL    Cheerful, was OOB    REVIEW OF SYSTEMS:    CONSTITUTIONAL: No F/C  RESPIRATORY: No cough,  No SOB  CARDIOVASCULAR: No CP/palpitations,    GASTROINTESTINAL: No abdominal pain  or NVD   GENITOURINARY: No UTI sx  NEUROLOGICAL: No headaches, NO wk/numbness  MUSCULOSKELETAL:   EXTREMITIES : no swelling,    Vital Signs Last 24 Hrs  T(C): 36.7 (23 Apr 2024 16:00), Max: 37 (23 Apr 2024 00:00)  T(F): 98 (23 Apr 2024 16:00), Max: 98.6 (23 Apr 2024 00:00)  HR: 83 (23 Apr 2024 19:00) (58 - 88)  BP: 130/67 (23 Apr 2024 19:00) (107/96 - 158/77)  BP(mean): 85 (23 Apr 2024 19:00) (84 - 102)  RR: 15 (23 Apr 2024 19:00) (12 - 27)  SpO2: 99% (23 Apr 2024 19:00) (95% - 100%)    Parameters below as of 23 Apr 2024 16:00  Patient On (Oxygen Delivery Method): nasal cannula  O2 Flow (L/min): 3      PHYSICAL EXAM:    GENERAL: NAD, All tubes out  EYES:  conjunctiva and sclera clear  NECK: Supple, No JVD/Bruit  NERVOUS SYSTEM:  A/O x3,   CHEST:  No rales or rhonchi  HEART:  RRR, No murmur  ABDOMEN: Soft, NT/ND BS+  EXTREMITIES:  No Edema;  SKIN: No rashes    LABS:                          11.7   14.25 )-----------( 100      ( 23 Apr 2024 02:43 )             35.0     04-23    138  |  101  |  49.8<H>  ----------------------------<  97  4.8   |  26.0  |  1.21    Ca    8.0<L>      23 Apr 2024 02:43  Mg     2.3     04-23        MEDICATIONS  (STANDING):  acetaminophen     Tablet .. PRN  albuterol/ipratropium for Nebulization PRN  albuterol/ipratropium for Nebulization  aspirin enteric coated  bisacodyl Suppository  budesonide  80 MICROgram(s)/formoterol 4.5 MICROgram(s) Inhaler  chlorhexidine 2% Cloths  clopidogrel Tablet  gabapentin  heparin   Injectable  lactulose Syrup PRN  melatonin  metoprolol tartrate  oxyCODONE    IR PRN  oxyCODONE    IR PRN  pantoprazole    Tablet  polyethylene glycol 3350  senna      
Subjective:   84yMale resting comfortably in bed. Wife and son at bedside. Patient and family disappointed that he might not go to surgery today but understands that further w/u needs to be completed prior to cardiac surgery. Denies chest pain, palpitations, SOB, ROJO, cough, N/V/D/C.        PAST MEDICAL & SURGICAL HISTORY:  CAD (coronary artery disease)      HLD (hyperlipidemia)      H/O secondary hypertension      Asthma      History of COPD      Former smoker      Statin intolerance      Stage 3 chronic kidney disease      PNA (pneumonia)      Empyema          Medications:  amLODIPine   Tablet 10 milliGRAM(s) Oral daily  budesonide  80 MICROgram(s)/formoterol 4.5 MICROgram(s) Inhaler 2 Puff(s) Inhalation two times a day  carvedilol 6.25 milliGRAM(s) Oral <User Schedule>  dextrose 50% Injectable 25 Gram(s) IV Push once  dextrose 50% Injectable 12.5 Gram(s) IV Push once  dextrose Oral Gel 15 Gram(s) Oral once PRN  finasteride 5 milliGRAM(s) Oral daily  glucagon  Injectable 1 milliGRAM(s) IntraMuscular once  heparin   Injectable 5000 Unit(s) SubCutaneous every 12 hours  insulin lispro (ADMELOG) corrective regimen sliding scale   SubCutaneous Before meals and at bedtime  mupirocin 2% Ointment 1 Application(s) Both Nostrils every 12 hours  pantoprazole    Tablet 40 milliGRAM(s) Oral before breakfast  sodium chloride 0.9% lock flush 3 milliLiter(s) IV Push every 8 hours  sodium chloride 0.9%. 1000 milliLiter(s) IV Continuous <Continuous>      MEDICATIONS  (PRN):  dextrose Oral Gel 15 Gram(s) Oral once PRN Blood Glucose LESS THAN 70 milliGRAM(s)/deciliter      Daily Review:  Height (cm): 162.6 (04-17 @ 17:30)  Weight (kg): 52.526 (04-17 @ 17:30)  BMI (kg/m2): 19.9 (04-17 @ 17:30)  BSA (m2): 1.55 (04-17 @ 17:30)    ABG - ( 17 Apr 2024 21:49 )  pH, Arterial: 7.460 pH, Blood: x     /  pCO2: 34    /  pO2: 113   / HCO3: 24    / Base Excess: 0.4   /  SaO2: 99.1                                    13.7   8.06  )-----------( 280      ( 17 Apr 2024 17:55 )             40.7   04-17    137  |  99  |  29.8<H>  ----------------------------<  90  4.5   |  24.0  |  1.59<H>    Ca    9.6      17 Apr 2024 17:55  Mg     2.0     04-17    TPro  7.6  /  Alb  4.2  /  TBili  0.5  /  DBili  x   /  AST  24  /  ALT  12  /  AlkPhos  92  04-17      PT/INR - ( 17 Apr 2024 17:55 )   PT: 10.6 sec;   INR: 0.95 ratio         PTT - ( 17 Apr 2024 17:55 )  PTT:33.9 sec    T(C): 36.6 (04-17-24 @ 21:59), Max: 36.8 (04-17-24 @ 17:30)  HR: 49 (04-17-24 @ 21:59) (49 - 55)  BP: 118/64 (04-17-24 @ 21:59) (118/64 - 138/73)  RR: 18 (04-17-24 @ 21:59) (16 - 18)  SpO2: 98% (04-17-24 @ 21:59) (98% - 99%)  Wt(kg): --    CAPILLARY BLOOD GLUCOSE      POCT Blood Glucose.: 101 mg/dL (17 Apr 2024 21:09)      I&O's Summary        PHYSICAL EXAM:  GENERAL: No acute distress, well-developed  NECK: no JVD  CHEST/LUNG: CTAB; No wheezes, rales, or rhonchi  HEART: RRR; (+) murmur. No rubs or gallops  ABDOMEN: Soft, non-tender, non-distended; normal bowel sounds  EXTREMITIES:  2+ peripheral pulses b/l, No clubbing, cyanosis, or edema  NEUROLOGY: AAO x 4
ART BLACK  MRN-[id number]    HPI:  84 male PMHx CAD, HLD, HTN, asthma, COPD, former smoker (4.5 cigars/daily, ~ 50years, quit 2018), Statin intolerance, CKD3 (baseline sCr 1.4), PNA/Empyema, pericarditis and possible adrenal tumor/ pheochromocytoma. In 2019 STEMI and underwent cath which showed moderate TVD and normal EF - no intervention was diagnosed with pericarditis. Has close follow up with his cardiologist and nephrologist for new onset secondary HTN (reportedly SBP 200s). Imaging in 1/2022 noted 8mm left adrenal nodule (repeat 1/2023 showing stable appearance), pheochromocytoma workup indeterminate. MR abdomen cannot rule out neuroendocrine tumor. Was started on beta blocker and diuretics with improvement in blood pressure. Patient with progressive ROJO and intermittent lower extremity edema. Had CTA of coronaries to follow up on progression of his CAD which revealed total calcium score of 3065. Now s/p elective LHC today showing LM and severe TVD. Transfered to [facility] for CABG eval.  Patient denies any chest pain, palpitations, HA, abd pain, n/v, dizziness, diaphoresis, syncope, changes in bowel bladder function, weight gain/loss. States "normal health."   (17 Apr 2024 18:30)      Surgery/Hospital Course:  PROCEDURES:  CABG, 2-3 vessels, with aortic valve replacement 18-Apr-2024     POST-OP DIAGNOSIS:  CAD (coronary artery disease)   Aortic stenosis     PRE-OP DIAGNOSIS:  CAD (coronary artery disease)   AS (aortic stenosis)     -  Today:  No acute events     ICU Vital Signs Last 24 Hrs  T(C): 36.7 (18 Apr 2024 12:09), Max: 36.7 (18 Apr 2024 12:09)  T(F): 98 (18 Apr 2024 12:09), Max: 98 (18 Apr 2024 12:09)  HR: 70 (18 Apr 2024 18:58) (47 - 84)  BP: 128/52 (18 Apr 2024 12:09) (115/57 - 138/65)  BP(mean): --  ABP: --  ABP(mean): --  RR: 16 (18 Apr 2024 12:09) (16 - 18)  SpO2: 100% (18 Apr 2024 18:58) (94% - 100%)    O2 Parameters below as of 18 Apr 2024 09:28  Patient On (Oxygen Delivery Method): room air            Physical Exam:  Gen: Sedated  CNS: non focal 	  Neck: no JVD  RES : clear , no wheezing              CVS: Regular  rhythm. Normal S1/S2  Abd: Soft, non-distended. Bowel sounds present.  Skin: No rash.  Ext:  no edema    ============================I/O===========================   I&O's Detail    17 Apr 2024 07:01  -  18 Apr 2024 07:00  --------------------------------------------------------  IN:    Oral Fluid: 300 mL  Total IN: 300 mL    OUT:    Voided (mL): 275 mL  Total OUT: 275 mL    Total NET: 25 mL        ============================ LABS =========================                        14.2   8.37  )-----------( 276      ( 18 Apr 2024 05:45 )             42.2     04-18    141  |  107  |  28.9<H>  ----------------------------<  87  4.2   |  23.0  |  1.60<H>    Ca    8.4      18 Apr 2024 05:45  Mg     1.8     04-18    TPro  7.6  /  Alb  4.2  /  TBili  0.5  /  DBili  x   /  AST  24  /  ALT  12  /  AlkPhos  92  04-17    LIVER FUNCTIONS - ( 17 Apr 2024 17:55 )  Alb: 4.2 g/dL / Pro: 7.6 g/dL / ALK PHOS: 92 U/L / ALT: 12 U/L / AST: 24 U/L / GGT: x           PT/INR - ( 17 Apr 2024 17:55 )   PT: 10.6 sec;   INR: 0.95 ratio         PTT - ( 17 Apr 2024 17:55 )  PTT:33.9 sec  ABG - ( 17 Apr 2024 21:49 )  pH, Arterial: 7.460 pH, Blood: x     /  pCO2: 34    /  pO2: 113   / HCO3: 24    / Base Excess: 0.4   /  SaO2: 99.1              Urinalysis Basic - ( 18 Apr 2024 05:45 )    Color: x / Appearance: x / SG: x / pH: x  Gluc: 87 mg/dL / Ketone: x  / Bili: x / Urobili: x   Blood: x / Protein: x / Nitrite: x   Leuk Esterase: x / RBC: x / WBC x   Sq Epi: x / Non Sq Epi: x / Bacteria: x      ======================Micro/Rad/Cardio=================  Culture: Reviewed   CXR: Reviewed  Echo:Reviewed  ======================================================  PAST MEDICAL & SURGICAL HISTORY:  CAD (coronary artery disease)      HLD (hyperlipidemia)      H/O secondary hypertension      Asthma      History of COPD      Former smoker      Statin intolerance      Stage 3 chronic kidney disease      PNA (pneumonia)      Empyema        ====================ASSESSMENT ==============  84 male PMHx CAD, HLD, HTN, asthma, COPD, former smoker (4.5 cigars/daily, ~ 50years, quit 2018), Statin intolerance, CKD3 (baseline sCr 1.4), PNA/Empyema, pericarditis and possible adrenal tumor/ pheochromocytoma. In 2019 STEMI and underwent cath which showed moderate TVD and normal EF - no intervention was diagnosed with pericarditis. Has close follow up with his cardiologist and nephrologist for new onset secondary HTN (reportedly SBP 200s). Imaging in 1/2022 noted 8mm left adrenal nodule (repeat 1/2023 showing stable appearance), pheochromocytoma workup indeterminate. MR abdomen cannot rule out neuroendocrine tumor. Was started on beta blocker and diuretics with improvement in blood pressure. Patient with progressive ROJO and intermittent lower extremity edema. Had CTA of coronaries to follow up on progression of his CAD which revealed total calcium score of 3065. Now s/p elective LHC today showing LM and severe TVD.    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   ---Post op Hypovolemia  ---Post op respiratory insufficiency       Plan:  ====================== NEUROLOGY=====================    ==================== RESPIRATORY======================  Post op respiratory insufficiency  Mechanical Ventilation:  Mode: AC/ CMV (Assist Control/ Continuous Mandatory Ventilation)  RR (machine): 10  TV (machine): 500  FiO2: 100  PEEP: 6  MAP: 9  PIP: 25      ====================CARDIOVASCULAR==================  Post op Hypovolemia    ===================HEMATOLOGIC/ONC ===================  Monitor H&H/Plts      ===================== RENAL =========================  Continue monitoring urine output, I&OS, BUN/Cr     ==================== GASTROINTESTINAL===================  pantoprazole  Injectable 40 milliGRAM(s) IV Push once  potassium chloride  10 mEq/50 mL IVPB 10 milliEquivalent(s) IV Intermittent every 1 hour  potassium chloride  10 mEq/50 mL IVPB 10 milliEquivalent(s) IV Intermittent every 1 hour  sodium chloride 0.9%. 1000 milliLiter(s) (5 mL/Hr) IV Continuous <Continuous>  sodium chloride 0.9%. 1000 milliLiter(s) (10 mL/Hr) IV Continuous <Continuous>    =======================    ENDOCRINE  =====================  dextrose 50% Injectable 50 milliLiter(s) IV Push every 15 minutes  insulin regular Infusion 2 Unit(s)/Hr (2 mL/Hr) IV Continuous <Continuous>    ========================INFECTIOUS DISEASE================  vancomycin  IVPB 750 milliGRAM(s) IV Intermittent once      -Close hemodynamic , ventilatory and drain monitoring and management per post op routine .  -Monitor Neurologic status ,   -Head of the bed should remain elevated to 45 degrees,  -Monitor adequacy of oxygenation and ventilation and attempt to wean oxygen ,  -Monitor for arrhythmias and monitor parameters for organ perfusion,  -Glycemic control is satisfactory,  -Nutritional goals will be met using po eventually , insure adequate caloric intake and monitor the same ,  -Electrolytes have been repleted as necessary , pain control has been achieved  and wound care has been carried out ,  -Stress ulcer and VTE prophylaxis will be achieved,  -Agressive PT and early mobility and ambulation goals will be met,  -The family was updated about the course and plan .      I have spent 35 minutes providing acute care for this critically ill patient     Patient requires continuous monitoring with bedside rhythm monitoring, pulse ox monitoring, and intermittent blood gas analysis. Care plan discussed with ICU care team. Patient remained critical and at risk for life threatening decompensation.           
ART BLACK  MRN-[id number]    HPI:  84 male PMHx CAD, HLD, HTN, asthma, COPD, former smoker (4.5 cigars/daily, ~ 50years, quit 2018), Statin intolerance, CKD3 (baseline sCr 1.4), PNA/Empyema, pericarditis and possible adrenal tumor/ pheochromocytoma. In 2019 STEMI and underwent cath which showed moderate TVD and normal EF - no intervention was diagnosed with pericarditis. Has close follow up with his cardiologist and nephrologist for new onset secondary HTN (reportedly SBP 200s). Imaging in 2022 noted 8mm left adrenal nodule (repeat 2023 showing stable appearance), pheochromocytoma workup indeterminate. MR abdomen cannot rule out neuroendocrine tumor. Was started on beta blocker and diuretics with improvement in blood pressure. Patient with progressive ROJO and intermittent lower extremity edema. Had CTA of coronaries to follow up on progression of his CAD which revealed total calcium score of 3065. Now s/p elective LHC today showing LM and severe TVD. Transfered to [facility] for CABG eval.  Patient denies any chest pain, palpitations, HA, abd pain, n/v, dizziness, diaphoresis, syncope, changes in bowel bladder function, weight gain/loss. States "normal health."   (2024 18:30)      Surgery/Hospital Course:  PROCEDURES:  CABG, 2-3 vessels, with aortic valve replacement -     POST-OP DIAGNOSIS:  CAD (coronary artery disease)   Aortic stenosis     PRE-OP DIAGNOSIS:  CAD (coronary artery disease)   AS (aortic stenosis)     -  Today:  No acute events -    ICU Vital Signs Last 24 Hrs  T(C): 38 (2024 10:30), Max: 38.1 (2024 07:30)  T(F): 100.4 (2024 10:30), Max: 100.6 (2024 07:30)  HR: 74 (2024 10:30) (54 - 99)  BP: 128/59 (2024 10:30) (77/51 - 146/70)  BP(mean): 78 (2024 10:30) (60 - 104)  ABP: 137/46 (2024 10:30) (90/27 - 159/62)  ABP(mean): 69 (2024 10:30) (45 - 95)  RR: 23 (2024 10:30) (10 - 32)  SpO2: 94% (2024 10:30) (92% - 100%)    O2 Parameters below as of 2024 10:00  Patient On (Oxygen Delivery Method): nasal cannula  O2 Flow (L/min): 6          Physical Exam:  Gen: A&O   CNS: non focal 	  Neck: no JVD  RES : clear , no wheezing              CVS: Regular  rhythm. Normal S1/S2  Abd: Soft, non-distended. Bowel sounds present.  Skin: No rash.  Ext:  no edema    ============================I/O===========================   I&O's Detail    2024 07:  -  2024 07:00  --------------------------------------------------------  IN:    Albumin 5%  - 250 mL: 250 mL    EPINEPHrine: 104 mL    FEIBA VH: 10 mL    Insulin: 37 mL    IV PiggyBack: 150 mL    IV PiggyBack: 100 mL    Lactated Ringers Bolus: 1000 mL    Norepinephrine: 237 mL    Plasma: 621 mL    PRBCs (Packed Red Blood Cells): 1303 mL    sodium chloride 0.9%: 65 mL    sodium chloride 0.9%: 130 mL    Vasopressin: 136.5 mL  Total IN: 4143.5 mL    OUT:    Chest Tube (mL): 610 mL    Chest Tube (mL): 1000 mL    Indwelling Catheter - Urethral (mL): 490 mL  Total OUT: 2100 mL    Total NET: 2043.5 mL      2024 07:01  -  2024 10:44  --------------------------------------------------------  IN:    DOBUTamine: 15.6 mL    IV PiggyBack: 100 mL    Norepinephrine: 2.9 mL    Oral Fluid: 185 mL    sodium chloride 0.9%: 30 mL    sodium chloride 0.9%: 15 mL  Total IN: 348.5 mL    OUT:    Chest Tube (mL): 50 mL    Chest Tube (mL): 30 mL    EPINEPHrine: 0 mL    Indwelling Catheter - Urethral (mL): 115 mL    Insulin: 0 mL    Vasopressin: 0 mL  Total OUT: 195 mL    Total NET: 153.5 mL        ============================ LABS =========================                        12.2   14.91 )-----------( 159      ( 2024 02:00 )             35.7     04-19    146<H>  |  106  |  22.1<H>  ----------------------------<  143<H>  3.9   |  20.0<L>  |  1.37<H>    Ca    8.7      2024 02:00  Phos  3.6     04-18  Mg     2.3     04-19    TPro  4.7<L>  /  Alb  3.2<L>  /  TBili  1.6  /  DBili  x   /  AST  66<H>  /  ALT  25  /  AlkPhos  46  04-19    LIVER FUNCTIONS - ( 2024 02:00 )  Alb: 3.2 g/dL / Pro: 4.7 g/dL / ALK PHOS: 46 U/L / ALT: 25 U/L / AST: 66 U/L / GGT: x           PT/INR - ( 2024 02:00 )   PT: 13.1 sec;   INR: 1.19 ratio         PTT - ( 2024 02:00 )  PTT:33.4 sec  ABG - ( 2024 10:31 )  pH, Arterial: 7.440 pH, Blood: x     /  pCO2: 38    /  pO2: 58    / HCO3: 26    / Base Excess: 1.6   /  SaO2: 93.6              Urinalysis Basic - ( 2024 02:00 )    Color: x / Appearance: x / SG: x / pH: x  Gluc: 143 mg/dL / Ketone: x  / Bili: x / Urobili: x   Blood: x / Protein: x / Nitrite: x   Leuk Esterase: x / RBC: x / WBC x   Sq Epi: x / Non Sq Epi: x / Bacteria: x      ======================Micro/Rad/Cardio=================  Culture: Reviewed   CXR: Reviewed  Echo:Reviewed  ======================================================  PAST MEDICAL & SURGICAL HISTORY:  CAD (coronary artery disease)      HLD (hyperlipidemia)      H/O secondary hypertension      Asthma      History of COPD      Former smoker      Statin intolerance      Stage 3 chronic kidney disease      PNA (pneumonia)      Empyema        ====================ASSESSMENT ==============  84 male PMHx CAD, HLD, HTN, asthma, COPD, former smoker (4.5 cigars/daily, ~ 50years, quit 2018), Statin intolerance, CKD3 (baseline sCr 1.4), PNA/Empyema, pericarditis and possible adrenal tumor/ pheochromocytoma. In 2019 STEMI and underwent cath which showed moderate TVD and normal EF - no intervention was diagnosed with pericarditis. Has close follow up with his cardiologist and nephrologist for new onset secondary HTN (reportedly SBP 200s). Imaging in 2022 noted 8mm left adrenal nodule (repeat 2023 showing stable appearance), pheochromocytoma workup indeterminate. MR abdomen cannot rule out neuroendocrine tumor. Was started on beta blocker and diuretics with improvement in blood pressure. Patient with progressive ROJO and intermittent lower extremity edema. Had CTA of coronaries to follow up on progression of his CAD which revealed total calcium score of 3065. Now s/p elective LHC today showing LM and severe TVD.    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  -hold home Eplerenone  -Adjust meds pr CrCl  -Avoid nephrotoxic agents  -Renal consult - appreciated  -Pulm consult today  -Continue GI ppx with Protonix  -SCDs while on bedrest  -DVT prophylaxis when clinically appropriate due to high chest tube output  - 5  -ABG ordered      ====================== NEUROLOGY=====================  acetaminophen     Tablet .. 975 milliGRAM(s) Oral every 6 hours  gabapentin 100 milliGRAM(s) Oral every 8 hours  oxyCODONE    IR 10 milliGRAM(s) Oral every 4 hours PRN Severe Pain (7 - 10)  oxyCODONE    IR 5 milliGRAM(s) Oral every 4 hours PRN Moderate Pain (4 - 6)    ==================== RESPIRATORY======================  Post op respiratory insufficiency  albuterol/ipratropium for Nebulization 3 milliLiter(s) Nebulizer every 6 hours    ====================CARDIOVASCULAR==================  Post op Hypovolemia  DOBUTamine Infusion 5 MICROgram(s)/kG/Min (7.8 mL/Hr) IV Continuous <Continuous>  norepinephrine Infusion 0.05 MICROgram(s)/kG/Min (4.88 mL/Hr) IV Continuous <Continuous>    ===================HEMATOLOGIC/ONC ===================  Monitor H&H/Plts    aspirin enteric coated 325 milliGRAM(s) Oral daily  heparin   Injectable 5000 Unit(s) SubCutaneous every 8 hours    ===================== RENAL =========================  Continue monitoring urine output, I&OS, BUN/Cr     ==================== GASTROINTESTINAL===================  ascorbic acid 500 milliGRAM(s) Oral two times a day  pantoprazole    Tablet 40 milliGRAM(s) Oral daily  polyethylene glycol 3350 17 Gram(s) Oral daily  senna 2 Tablet(s) Oral at bedtime  sodium chloride 0.9%. 1000 milliLiter(s) (10 mL/Hr) IV Continuous <Continuous>  sodium chloride 0.9%. 1000 milliLiter(s) (5 mL/Hr) IV Continuous <Continuous>    =======================    ENDOCRINE  =====================  dexAMETHasone  Injectable 4 milliGRAM(s) IV Push every 6 hours  dextrose 50% Injectable 50 milliLiter(s) IV Push every 15 minutes  insulin lispro Injectable (ADMELOG) 2 Unit(s) SubCutaneous three times a day before meals  insulin regular Infusion 2 Unit(s)/Hr (2 mL/Hr) IV Continuous <Continuous>    ========================INFECTIOUS DISEASE================  cefuroxime  IVPB 1500 milliGRAM(s) IV Intermittent every 8 hours  vancomycin  IVPB 750 milliGRAM(s) IV Intermittent once      -Monitor Neurologic status ,   -Head of the bed should remain elevated to 45 degrees,  -Monitor for arrhythmias and monitor parameters for organ perfusion,  -Glycemic control is satisfactory,  -Nutritional goals will be met using po eventually , insure adequate caloric intake and monitor the same ,  -Electrolytes have been repleted as necessary , pain control has been achieved  and wound care has been carried out ,  -Stress ulcer and VTE prophylaxis will be achieved,  -Agressive PT and early mobility and ambulation goals will be met,    I have spent 35 minutes providing acute care for this critically ill patient     Patient requires continuous monitoring with bedside rhythm monitoring, pulse ox monitoring, and intermittent blood gas analysis. Care plan discussed with ICU care team. Patient remained critical and at risk for life threatening decompensation.           
NEPHROLOGY INTERVAL HPI/OVERNIGHT EVENTS:    Examined  Events noted   No distress    MEDICATIONS  (STANDING):  albuterol/ipratropium for Nebulization 3 milliLiter(s) Nebulizer every 6 hours  apixaban 2.5 milliGRAM(s) Oral every 12 hours  aspirin enteric coated 81 milliGRAM(s) Oral daily  bisacodyl Suppository 10 milliGRAM(s) Rectal once  budesonide 160 MICROgram(s)/formoterol 4.5 MICROgram(s) Inhaler 2 Puff(s) Inhalation two times a day  furosemide    Tablet 40 milliGRAM(s) Oral daily  melatonin 5 milliGRAM(s) Oral at bedtime  metoprolol tartrate 25 milliGRAM(s) Oral two times a day  pantoprazole    Tablet 40 milliGRAM(s) Oral daily  polyethylene glycol 3350 17 Gram(s) Oral daily  senna 2 Tablet(s) Oral at bedtime    MEDICATIONS  (PRN):  acetaminophen     Tablet .. 650 milliGRAM(s) Oral every 6 hours PRN Mild Pain (1 - 3)  oxyCODONE    IR 10 milliGRAM(s) Oral every 4 hours PRN Severe Pain (7 - 10)  oxyCODONE    IR 5 milliGRAM(s) Oral every 4 hours PRN Moderate Pain (4 - 6)      Allergies    Levaquin (Unknown)  ampicillin (Unknown)  penicillin (Unknown)    Intolerances    Lipitor (Unknown)      Vital Signs Last 24 Hrs  T(C): 36.4 (2024 12:00), Max: 37 (2024 08:00)  T(F): 97.5 (2024 12:00), Max: 98.6 (2024 08:00)  HR: 76 (2024 12:00) (67 - 87)  BP: 105/60 (2024 11:00) (101/60 - 153/68)  BP(mean): 74 (2024 11:00) (74 - 96)  RR: 28 (2024 12:00) (13 - 37)  SpO2: 96% (2024 12:00) (94% - 100%)    Parameters below as of 2024 12:00    O2 Flow (L/min): 2    Daily     Daily Weight in k.6 (2024 03:58)    PHYSICAL EXAM:    GENERAL: NAD  EYES: EOMI  NECK: Supple  NERVOUS SYSTEM:  A/O x3   CHEST:  No rales or rhonchi  HEART:  RRR, No murmur  ABDOMEN: Soft, NT/ND BS+  EXTREMITIES:  No Edema; R leg no drainage     LABS:                        11.9   12.26 )-----------( 176      ( 2024 04:04 )             35.7         138  |  101  |  42.6<H>  ----------------------------<  100<H>  4.2   |  23.0  |  1.25    Ca    7.7<L>      2024 04:04  Mg     2.2             Urinalysis Basic - ( 2024 04:04 )    Color: x / Appearance: x / SG: x / pH: x  Gluc: 100 mg/dL / Ketone: x  / Bili: x / Urobili: x   Blood: x / Protein: x / Nitrite: x   Leuk Esterase: x / RBC: x / WBC x   Sq Epi: x / Non Sq Epi: x / Bacteria: x      Magnesium: 2.2 mg/dL ( @ 04:04)          RADIOLOGY & ADDITIONAL TESTS:  
ART BLACK  MRN#: [id number]  Subjective: The patient was in the CTICU in critical condition at risk for imminent decompensation and was seen and evaluate on AM rounds with the entire multidisciplinary team.     OBJECTIVE:  ICU Vital Signs Last 24 Hrs  T(C): 37 (2024 08:00), Max: 37 (2024 08:00)  T(F): 98.6 (2024 08:00), Max: 98.6 (2024 08:00)  HR: 76 (2024 09:00) (67 - 92)  BP: 101/60 (2024 09:00) (101/60 - 153/68)  BP(mean): 74 (2024 09:00) (74 - 96)  ABP: --  ABP(mean): --  RR: 37 (2024 09:00) (13 - 37)  SpO2: 98% (2024 09:00) (93% - 100%)    O2 Parameters below as of 2024 08:20  Patient On (Oxygen Delivery Method): nasal cannula, 2L    I&O's Summary    2024 07:01  -  2024 07:00  --------------------------------------------------------  IN: 750 mL / OUT: 1200 mL / NET: -450 mL    2024 07:01  -  2024 10:11  --------------------------------------------------------  IN: 0 mL / OUT: 250 mL / NET: -250 mL        PHYSICAL EXAM:Daily     Daily Weight in k.4 (2024 04:41)  General: WN/WD NAD    HEENT:     + NCAT  + EOMI  - Conjuctival edema   - Icterus   - Thrush   - ETT  - NGT/OGT  Neck:         + FROM    + JVD     - Nodes     - Masses    + Mid-line trachea   - Tracheostomy  Chest:         - Sternal click  - Sternal drainage  - Pacing wires  - Chest tubes  - SubQ emphysema  Lungs:          + CTA   - Rhonchi    - Rales    - Wheezing     - Decreased BS   - Dullness R L  Cardiac:       + S1 + S2    + RRR   - Irregular   - S3  - S4    - Murmurs   - Rub   - Hamman’s sign   Abdomen:    + BS     + Soft    + Non-tender     - Distended    - Organomegaly  - PEG  Extremities:   - Cyanosis U/L   - Clubbing  U/L  - LE/UE Edema   + Capillary refill    + Pulses   Neuro:        + Awake   +  Alert   - Confused   - Lethargic   - Sedated   - Generalized Weakness  Skin:        - Rashes    - Erythema   + Normal incisions   + IV sites intact  - Sacral decubitus    MEDICATIONS  (STANDING):  albuterol/ipratropium for Nebulization 3 milliLiter(s) Nebulizer every 6 hours  apixaban 2.5 milliGRAM(s) Oral every 12 hours  aspirin enteric coated 81 milliGRAM(s) Oral daily  bisacodyl Suppository 10 milliGRAM(s) Rectal once  budesonide 160 MICROgram(s)/formoterol 4.5 MICROgram(s) Inhaler 2 Puff(s) Inhalation two times a day  furosemide    Tablet 40 milliGRAM(s) Oral daily  melatonin 5 milliGRAM(s) Oral at bedtime  metoprolol tartrate 25 milliGRAM(s) Oral two times a day  pantoprazole    Tablet 40 milliGRAM(s) Oral daily  polyethylene glycol 3350 17 Gram(s) Oral daily  senna 2 Tablet(s) Oral at bedtime    MEDICATIONS  (PRN):  acetaminophen     Tablet .. 650 milliGRAM(s) Oral every 6 hours PRN Mild Pain (1 - 3)  albuterol/ipratropium for Nebulization 3 milliLiter(s) Nebulizer every 6 hours PRN Shortness of Breath  oxyCODONE    IR 5 milliGRAM(s) Oral every 4 hours PRN Moderate Pain (4 - 6)  oxyCODONE    IR 10 milliGRAM(s) Oral every 4 hours PRN Severe Pain (7 - 10)      LABS: All Lab data reviewed and analyzed                                    )-----------( 176      ( 2024 04:04 )             35.7       138  |  101  |  42.6<H>  ----------------------------<  100<H>  4.2   |  23.0  |  1.25    Ca    7.7<L>      2024 04:04  Mg     2.2         I independently reviewed CXR overnight from today 24 and ruled out effusion, PTX, or collapse of lung       Assessment: Respiratory insufficieny, hypervolemia, and CKD ,     Plan:   - Respiratory status required supplemental oxygen and the following of continuous pulse oximetry for support & to prevent decompensation  - Continued early mobilization as tolerated  - Patient requires deresuscitation for perioperative fluid administration and edema  - ASA continued for graft occlusion prophylaxis  - Lipitor for long term graft patency  - Eliquis for atrial fibrillation-Flutter  - Protonix maintained for GI bleeding prophylaxis  - Lopressor and ERP Amiodarone for atrial fibrillation    Patient required critical care management and is at risk for life threatening decompensation I provided 105 minutes of non-continuous care to the patient.  
ART BLACK  MRN#: [id number]  Subjective: The patient was in the CTICU in critical condition at risk for imminent decompensation and was seen and evaluate on AM rounds with the entire multidisciplinary team.     OBJECTIVE:  ICU Vital Signs Last 24 Hrs  T(C): 36.7 (2024 00:00), Max: 37.2 (2024 15:50)  T(F): 98.1 (2024 00:00), Max: 99 (2024 15:50)  HR: 76 (2024 09:36) (70 - 93)  BP: 118/64 (2024 09:00) (99/72 - 144/85)  BP(mean): 80 (2024 09:00) (77 - 105)  ABP: --  ABP(mean): --  RR: 24 (2024 09:00) (14 - 33)  SpO2: 98% (2024 09:36) (94% - 99%)    O2 Parameters below as of 2024 09:36  Patient On (Oxygen Delivery Method): nasal cannula w/ humidification, 3l       @ 07: @ 07:00  --------------------------------------------------------  IN: 1140 mL / OUT: 1350 mL / NET: -210 mL     @ 07: @ 10:10  --------------------------------------------------------  IN: 240 mL / OUT: 275 mL / NET: -35 mL    PHYSICAL EXAM:Daily     Daily Weight in k.4 (2024 04:41)  General: WN/WD NAD    HEENT:     + NCAT  + EOMI  - Conjuctival edema   - Icterus   - Thrush   - ETT  - NGT/OGT  Neck:         + FROM    + JVD     - Nodes     - Masses    + Mid-line trachea   - Tracheostomy  Chest:         - Sternal click  - Sternal drainage  - Pacing wires  - Chest tubes  - SubQ emphysema  Lungs:          + CTA   - Rhonchi    - Rales    - Wheezing     - Decreased BS   - Dullness R L  Cardiac:       + S1 + S2    + RRR   - Irregular   - S3  - S4    - Murmurs   - Rub   - Hamman’s sign   Abdomen:    + BS     + Soft    + Non-tender     - Distended    - Organomegaly  - PEG  Extremities:   - Cyanosis U/L   - Clubbing  U/L  - LE/UE Edema   + Capillary refill    + Pulses   Neuro:        + Awake   +  Alert   - Confused   - Lethargic   - Sedated   - Generalized Weakness  Skin:        - Rashes    - Erythema   + Normal incisions   + IV sites intact  - Sacral decubitus      MEDICATIONS  (STANDING):  albuterol/ipratropium for Nebulization 3 milliLiter(s) Nebulizer every 6 hours  aspirin enteric coated 325 milliGRAM(s) Oral daily  bisacodyl Suppository 10 milliGRAM(s) Rectal once  budesonide 160 MICROgram(s)/formoterol 4.5 MICROgram(s) Inhaler 2 Puff(s) Inhalation two times a day  clopidogrel Tablet 75 milliGRAM(s) Oral daily  furosemide    Tablet 40 milliGRAM(s) Oral daily  heparin   Injectable 5000 Unit(s) SubCutaneous every 8 hours  melatonin 5 milliGRAM(s) Oral at bedtime  metoprolol tartrate 25 milliGRAM(s) Oral two times a day  pantoprazole    Tablet 40 milliGRAM(s) Oral daily  polyethylene glycol 3350 17 Gram(s) Oral daily  senna 2 Tablet(s) Oral at bedtime    MEDICATIONS  (PRN):  acetaminophen     Tablet .. 650 milliGRAM(s) Oral every 6 hours PRN Mild Pain (1 - 3)  albuterol/ipratropium for Nebulization 3 milliLiter(s) Nebulizer every 6 hours PRN Shortness of Breath  oxyCODONE    IR 10 milliGRAM(s) Oral every 4 hours PRN Severe Pain (7 - 10)  oxyCODONE    IR 5 milliGRAM(s) Oral every 4 hours PRN Moderate Pain (4 - 6)    LABS: All Lab data reviewed and analyzed                        12.32 )-----------( 141      ( 2024 02:23 )             36.6    -    135  |  99  |  42.3<H>  ----------------------------<  111<H>  4.4   |  23.0  |  1.11    Ca    7.7<L>      2024 02:23  Mg     2.3           I independently reviewed CXR overnight from today 24 @ 10:10 and ruled out effusion, PTX, or collapse of lung       Assessment: Respiratory insufficieny, hypervolemia, and CKD ,     Plan:   - Respiratory status required supplemental oxygen and the following of continuous pulse oximetry for support & to prevent decompensation  - Continued early mobilization as tolerated  - Patient requires deresuscitation for perioperative fluid administration and edema  - Addressed analgesic regimen to optimize function; Patient require IV/parenteral narcotics  - ASA continued for graft occlusion-thromboembolism prophylaxis  - Lipitor for long term graft patency  - SQ Heparin continued for VTE prophylaxis in addition to Venodyne boots  - Protonix maintained for GI bleeding prophylaxis  - Increased Lopressor and ERP Amiodarone for atrial fibrillation    Patient required critical care management and is at risk for life threatening decompensation I provided 105 minutes of non-continuous care to the patient.  
ART BLACK  MRN#: [id number]  Subjective: The patient was in the CTICU was seen and evaluate on AM rounds with the entire multidisciplinary team.     OBJECTIVE:  ICU Vital Signs Last 24 Hrs  T(C): 36.6 (2024 07:00), Max: 36.9 (2024 20:05)  T(F): 97.8 (2024 07:00), Max: 98.5 (2024 20:05)  HR: 66 (2024 09:14) (60 - 91)  BP: 105/59 (2024 08:00) (102/61 - 154/75)  BP(mean): 74 (2024 08:00) (74 - 100)  ABP: --  ABP(mean): --  RR: 30 (2024 08:00) (13 - 38)  SpO2: 99% (2024 09:14) (80% - 100%)    O2 Parameters below as of 2024 09:14  Patient On (Oxygen Delivery Method): nasal cannula    I&O's Summary    2024 07:01  -  2024 07:00  --------------------------------------------------------  IN: 250 mL / OUT: 1150 mL / NET: -900 mL    2024 07:01  -  2024 09:41  --------------------------------------------------------  IN: 240 mL / OUT: 175 mL / NET: 65 mL        PHYSICAL EXAM:Daily     Daily Weight in k.4 (2024 04:41)  General: WN/WD NAD    HEENT:     + NCAT  + EOMI  - Conjuctival edema   - Icterus   - Thrush   - ETT  - NGT/OGT  Neck:         + FROM    + JVD     - Nodes     - Masses    + Mid-line trachea   - Tracheostomy  Chest:         - Sternal click  - Sternal drainage  - Pacing wires  - Chest tubes  - SubQ emphysema  Lungs:          + CTA   - Rhonchi    - Rales    - Wheezing     - Decreased BS   - Dullness R L  Cardiac:       + S1 + S2    + RRR   - Irregular   - S3  - S4    - Murmurs   - Rub   - Hamman’s sign   Abdomen:    + BS     + Soft    + Non-tender     - Distended    - Organomegaly  - PEG  Extremities:   - Cyanosis U/L   - Clubbing  U/L  - LE/UE Edema   + Capillary refill    + Pulses   Neuro:        + Awake   +  Alert   - Confused   - Lethargic   - Sedated   - Generalized Weakness  Skin:        - Rashes    - Erythema   + Normal incisions   + IV sites intact  - Sacral decubitus    MEDICATIONS  (STANDING):  albuterol/ipratropium for Nebulization 3 milliLiter(s) Nebulizer every 6 hours  apixaban 2.5 milliGRAM(s) Oral every 12 hours  aspirin enteric coated 81 milliGRAM(s) Oral daily  bisacodyl Suppository 10 milliGRAM(s) Rectal once  budesonide 160 MICROgram(s)/formoterol 4.5 MICROgram(s) Inhaler 2 Puff(s) Inhalation two times a day  furosemide    Tablet 40 milliGRAM(s) Oral daily  melatonin 5 milliGRAM(s) Oral at bedtime  metoprolol tartrate 25 milliGRAM(s) Oral two times a day  pantoprazole    Tablet 40 milliGRAM(s) Oral daily  polyethylene glycol 3350 17 Gram(s) Oral daily  senna 2 Tablet(s) Oral at bedtime    MEDICATIONS  (PRN):  acetaminophen     Tablet .. 650 milliGRAM(s) Oral every 6 hours PRN Mild Pain (1 - 3)    LABS: All Lab data reviewed and analyzed                        10.6   10.57 )-----------( 212      ( 2024 04:24 )             31.8       135  |  99  |  46.1<H>  ----------------------------<  101<H>  4.0   |  26.0  |  1.43<H>    Ca    7.6<L>      2024 04:24  Mg     2.4         I independently reviewed CXR overnight from today 24 and ruled out effusion, PTX, or collapse of lung       Assessment: Respiratory insufficieny, hypervolemia, and CKD ,     Plan:   - Respiratory status required supplemental oxygen and the following of continuous pulse oximetry for support & to prevent decompensation  - Continued early mobilization as tolerated  - Patient requires deresuscitation for perioperative fluid administration and edema  - ASA continued for graft occlusion prophylaxis  - Lipitor for long term graft patency  - Eliquis for atrial fibrillation-Flutter  - Protonix maintained for GI bleeding prophylaxis  - Lopressor for atrial fibrillation
ART BLACK  MRN-[id number]    HPI:  84 male PMHx CAD, HLD, HTN, asthma, COPD, former smoker (4.5 cigars/daily, ~ 50years, quit 2018), Statin intolerance, CKD3 (baseline sCr 1.4), PNA/Empyema, pericarditis and possible adrenal tumor/ pheochromocytoma. In 2019 STEMI and underwent cath which showed moderate TVD and normal EF - no intervention was diagnosed with pericarditis. Has close follow up with his cardiologist and nephrologist for new onset secondary HTN (reportedly SBP 200s). Imaging in 2022 noted 8mm left adrenal nodule (repeat 2023 showing stable appearance), pheochromocytoma workup indeterminate. MR abdomen cannot rule out neuroendocrine tumor. Was started on beta blocker and diuretics with improvement in blood pressure. Patient with progressive ROJO and intermittent lower extremity edema. Had CTA of coronaries to follow up on progression of his CAD which revealed total calcium score of 3065. Now s/p elective LHC today showing LM and severe TVD. Transfered to [facility] for CABG eval.  Patient denies any chest pain, palpitations, HA, abd pain, n/v, dizziness, diaphoresis, syncope, changes in bowel bladder function, weight gain/loss. States "normal health."   (2024 18:30)    Surgery/Hospital Course:  PROCEDURES:  CABG, 2-3 vessels, with aortic valve replacement -     POST-OP DIAGNOSIS:  CAD (coronary artery disease)   Aortic stenosis     PRE-OP DIAGNOSIS:  CAD (coronary artery disease)   AS (aortic stenosis)     -  Today:  No acute events-    ICU Vital Signs Last 24 Hrs  T(C): 37.3 (2024 13:00), Max: 38.2 (2024 18:00)  T(F): 99.1 (2024 13:00), Max: 100.8 (2024 18:00)  HR: 70 (2024 13:00) (63 - 106)  BP: 135/75 (2024 13:00) (130/63 - 153/77)  BP(mean): 93 (2024 13:00) (80 - 98)  ABP: 104/81 (2024 06:00) (104/66 - 172/59)  ABP(mean): 90 (2024 06:00) (74 - 92)  RR: 21 (2024 13:00) (15 - 33)  SpO2: 95% (2024 13:00) (91% - 98%)    O2 Parameters below as of 2024 13:00  Patient On (Oxygen Delivery Method): nasal cannula, high flow  O2 Flow (L/min): 40  O2 Concentration (%): 40        Physical Exam:  Gen: A&O   CNS: non focal 	  Neck: no JVD  RES : clear , no wheezing              CVS: Regular  rhythm. Normal S1/S2  Abd: Soft, non-distended. Bowel sounds present.  Skin: No rash.  Ext:  no edema    ============================I/O===========================   I&O's Detail    2024 07:01  -  2024 07:00  --------------------------------------------------------  IN:    Albumin 5%  - 250 mL: 250 mL    DOBUTamine: 179.4 mL    Insulin: 3 mL    IV PiggyBack: 100 mL    IV PiggyBack: 250 mL    IV PiggyBack: 100 mL    Norepinephrine: 2.9 mL    Oral Fluid: 555 mL    sodium chloride 0.9%: 120 mL    sodium chloride 0.9%: 240 mL  Total IN: 1800.3 mL    OUT:    Chest Tube (mL): 490 mL    Chest Tube (mL): 320 mL    EPINEPHrine: 0 mL    Indwelling Catheter - Urethral (mL): 940 mL    Vasopressin: 0 mL  Total OUT: 1750 mL    Total NET: 50.3 mL      2024 07:01  -  2024 14:26  --------------------------------------------------------  IN:    DOBUTamine: 54.6 mL    IV PiggyBack: 250 mL    Oral Fluid: 400 mL    sodium chloride 0.9%: 35 mL  Total IN: 739.6 mL    OUT:    Chest Tube (mL): 40 mL    Chest Tube (mL): 35 mL    Indwelling Catheter - Urethral (mL): 850 mL  Total OUT: 925 mL    Total NET: -185.4 mL        ============================ LABS =========================                        10.7   14.21 )-----------( 101      ( 2024 02:00 )             30.3     04-20    136  |  99  |  43.1<H>  ----------------------------<  167<H>  4.7   |  24.0  |  1.72<H>    Ca    8.2<L>      2024 11:55  Phos  3.6     04-18  Mg     2.1     04-20    TPro  4.7<L>  /  Alb  3.2<L>  /  TBili  1.6  /  DBili  x   /  AST  66<H>  /  ALT  25  /  AlkPhos  46  04-19    LIVER FUNCTIONS - ( 2024 02:00 )  Alb: 3.2 g/dL / Pro: 4.7 g/dL / ALK PHOS: 46 U/L / ALT: 25 U/L / AST: 66 U/L / GGT: x           PT/INR - ( 2024 02:00 )   PT: 13.1 sec;   INR: 1.19 ratio         PTT - ( 2024 02:00 )  PTT:33.4 sec  ABG - ( 2024 10:31 )  pH, Arterial: 7.440 pH, Blood: x     /  pCO2: 38    /  pO2: 58    / HCO3: 26    / Base Excess: 1.6   /  SaO2: 93.6              Urinalysis Basic - ( 2024 11:55 )    Color: x / Appearance: x / SG: x / pH: x  Gluc: 167 mg/dL / Ketone: x  / Bili: x / Urobili: x   Blood: x / Protein: x / Nitrite: x   Leuk Esterase: x / RBC: x / WBC x   Sq Epi: x / Non Sq Epi: x / Bacteria: x      ======================Micro/Rad/Cardio=================  Culture: Reviewed   CXR: Reviewed  Echo:Reviewed  ======================================================  PAST MEDICAL & SURGICAL HISTORY:  CAD (coronary artery disease)      HLD (hyperlipidemia)      H/O secondary hypertension      Asthma      History of COPD      Former smoker      Statin intolerance      Stage 3 chronic kidney disease      PNA (pneumonia)      Empyema        ====================ASSESSMENT ==============  84 male PMHx CAD, HLD, HTN, asthma, COPD, former smoker (4.5 cigars/daily, ~ 50years, quit 2018), Statin intolerance, CKD3 (baseline sCr 1.4), PNA/Empyema, pericarditis and possible adrenal tumor/ pheochromocytoma. In 2019 STEMI and underwent cath which showed moderate TVD and normal EF - no intervention was diagnosed with pericarditis. Has close follow up with his cardiologist and nephrologist for new onset secondary HTN (reportedly SBP 200s). Imaging in 2022 noted 8mm left adrenal nodule (repeat 2023 showing stable appearance), pheochromocytoma workup indeterminate. MR abdomen cannot rule out neuroendocrine tumor. Was started on beta blocker and diuretics with improvement in blood pressure. Patient with progressive ROJO and intermittent lower extremity edema. Had CTA of coronaries to follow up on progression of his CAD which revealed total calcium score of 3065. Now s/p elective LHC today showing LM and severe TVD.    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  -hold home Eplerenone  -Adjust meds pr CrCl  -Avoid nephrotoxic agents  -Continue GI ppx with Protonix  -SCDs while on bedrest  -DVT prophylaxis   - 5  -Decadron for 24h  -DC chest tube    ====================== NEUROLOGY=====================  acetaminophen     Tablet .. 975 milliGRAM(s) Oral every 6 hours  gabapentin 100 milliGRAM(s) Oral every 8 hours  melatonin 5 milliGRAM(s) Oral at bedtime  oxyCODONE    IR 10 milliGRAM(s) Oral every 4 hours PRN Severe Pain (7 - 10)  oxyCODONE    IR 5 milliGRAM(s) Oral every 4 hours PRN Moderate Pain (4 - 6)    ==================== RESPIRATORY======================  Post op respiratory insufficiency  albuterol/ipratropium for Nebulization 3 milliLiter(s) Nebulizer every 6 hours PRN Shortness of Breath  albuterol/ipratropium for Nebulization 3 milliLiter(s) Nebulizer every 6 hours  budesonide  80 MICROgram(s)/formoterol 4.5 MICROgram(s) Inhaler 2 Puff(s) Inhalation two times a day    ====================CARDIOVASCULAR==================  Post op Hypovolemia  DOBUTamine Infusion 5 MICROgram(s)/kG/Min (7.8 mL/Hr) IV Continuous <Continuous>  furosemide   Injectable 40 milliGRAM(s) IV Push two times a day    ===================HEMATOLOGIC/ONC ===================  Monitor H&H/Plts    aspirin enteric coated 325 milliGRAM(s) Oral daily  clopidogrel Tablet 75 milliGRAM(s) Oral daily  heparin   Injectable 5000 Unit(s) SubCutaneous every 8 hours    ===================== RENAL =========================  Continue monitoring urine output, I&OS, BUN/Cr     ==================== GASTROINTESTINAL===================  ascorbic acid 500 milliGRAM(s) Oral two times a day  bisacodyl Suppository 10 milliGRAM(s) Rectal once  pantoprazole    Tablet 40 milliGRAM(s) Oral daily  polyethylene glycol 3350 17 Gram(s) Oral daily  senna 2 Tablet(s) Oral at bedtime  sodium chloride 0.9%. 1000 milliLiter(s) (10 mL/Hr) IV Continuous <Continuous>  sodium chloride 0.9%. 1000 milliLiter(s) (5 mL/Hr) IV Continuous <Continuous>    =======================    ENDOCRINE  =====================  dexAMETHasone  Injectable 4 milliGRAM(s) IV Push every 6 hours  insulin lispro (ADMELOG) corrective regimen sliding scale   SubCutaneous Before meals and at bedtime    ========================INFECTIOUS DISEASE================  cefuroxime  IVPB 1500 milliGRAM(s) IV Intermittent every 8 hours      -Close hemodynamic , ventilatory and drain monitoring and management per post op routine .  -Monitor Neurologic status ,   -Head of the bed should remain elevated to 45 degrees,  -Monitor adequacy of oxygenation and ventilation and attempt to wean oxygen ,  -Monitor for arrhythmias and monitor parameters for organ perfusion,  -Glycemic control is satisfactory,  -Nutritional goals will be met using po eventually , insure adequate caloric intake and monitor the same ,  -Electrolytes have been repleted as necessary , pain control has been achieved  and wound care has been carried out ,  -Stress ulcer and VTE prophylaxis will be achieved,  -Agressive PT and early mobility and ambulation goals will be met,  -The family was updated about the course and plan .      I have spent 35 minutes providing acute care for this critically ill patient     Patient requires continuous monitoring with bedside rhythm monitoring, pulse ox monitoring, and intermittent blood gas analysis. Care plan discussed with ICU care team. Patient remained critical and at risk for life threatening decompensation.           
ART BLACK  MRN-[id number]    HPI:  84 male PMHx CAD, HLD, HTN, asthma, COPD, former smoker (4.5 cigars/daily, ~ 50years, quit 2018), Statin intolerance, CKD3 (baseline sCr 1.4), PNA/Empyema, pericarditis and possible adrenal tumor/ pheochromocytoma. In 2019 STEMI and underwent cath which showed moderate TVD and normal EF - no intervention was diagnosed with pericarditis. Has close follow up with his cardiologist and nephrologist for new onset secondary HTN (reportedly SBP 200s). Imaging in 2022 noted 8mm left adrenal nodule (repeat 2023 showing stable appearance), pheochromocytoma workup indeterminate. MR abdomen cannot rule out neuroendocrine tumor. Was started on beta blocker and diuretics with improvement in blood pressure. Patient with progressive ROJO and intermittent lower extremity edema. Had CTA of coronaries to follow up on progression of his CAD which revealed total calcium score of 3065. Now s/p elective LHC today showing LM and severe TVD. Transfered to [facility] for CABG eval.  Patient denies any chest pain, palpitations, HA, abd pain, n/v, dizziness, diaphoresis, syncope, changes in bowel bladder function, weight gain/loss. States "normal health."   (2024 18:30)      Surgery/Hospital Course:  PROCEDURES:  CABG, 2-3 vessels, with aortic valve replacement -     POST-OP DIAGNOSIS:  CAD (coronary artery disease)   Aortic stenosis     PRE-OP DIAGNOSIS:  CAD (coronary artery disease)   AS (aortic stenosis)     -  Today:  No acute events--    ICU Vital Signs Last 24 Hrs  T(C): 36.5 (2024 08:00), Max: 37.4 (2024 12:00)  T(F): 97.7 (2024 08:00), Max: 99.3 (2024 12:00)  HR: 67 (2024 08:32) (57 - 79)  BP: 138/71 (2024 08:00) (121/70 - 150/71)  BP(mean): 92 (2024 08:00) (80 - 95)  ABP: --  ABP(mean): --  RR: 19 (2024 08:00) (11 - 29)  SpO2: 97% (2024 08:32) (91% - 98%)    O2 Parameters below as of 2024 08:32  Patient On (Oxygen Delivery Method): nasal cannula w/ humidification, 4 lpm            Physical Exam:  Gen: A&O   CNS: non focal 	  Neck: no JVD  RES : clear , no wheezing              CVS: Regular  rhythm. Normal S1/S2  Abd: Soft, non-distended. Bowel sounds present.  Skin: No rash.  Ext:  no edema    ============================I/O===========================   I&O's Detail    2024 07:01  -  2024 07:00  --------------------------------------------------------  IN:    DOBUTamine: 93.6 mL    DOBUTamine: 48 mL    IV PiggyBack: 250 mL    Oral Fluid: 600 mL    sodium chloride 0.9%: 115 mL  Total IN: 1106.6 mL    OUT:    Chest Tube (mL): 130 mL    Chest Tube (mL): 110 mL    Indwelling Catheter - Urethral (mL): 3385 mL  Total OUT: 3625 mL    Total NET: -2518.4 mL      2024 07:01  -  2024 10:53  --------------------------------------------------------  IN:  Total IN: 0 mL    OUT:    Chest Tube (mL): 0 mL    Chest Tube (mL): 10 mL    Indwelling Catheter - Urethral (mL): 575 mL  Total OUT: 585 mL    Total NET: -585 mL        ============================ LABS =========================                        11.0   14.08 )-----------( 83       ( 2024 02:15 )             31.9     04-21    137  |  100  |  54.6<H>  ----------------------------<  135<H>  4.7   |  26.0  |  1.93<H>    Ca    8.0<L>      2024 02:15  Mg     2.6     04-21            Urinalysis Basic - ( 2024 02:15 )    Color: x / Appearance: x / SG: x / pH: x  Gluc: 135 mg/dL / Ketone: x  / Bili: x / Urobili: x   Blood: x / Protein: x / Nitrite: x   Leuk Esterase: x / RBC: x / WBC x   Sq Epi: x / Non Sq Epi: x / Bacteria: x      ======================Micro/Rad/Cardio=================  Culture: Reviewed   CXR: Reviewed  Echo:Reviewed  ======================================================  PAST MEDICAL & SURGICAL HISTORY:  CAD (coronary artery disease)      HLD (hyperlipidemia)      H/O secondary hypertension      Asthma      History of COPD      Former smoker      Statin intolerance      Stage 3 chronic kidney disease      PNA (pneumonia)      Empyema        ====================ASSESSMENT ==============  84 male PMHx CAD, HLD, HTN, asthma, COPD, former smoker (4.5 cigars/daily, ~ 50years, quit 2018), Statin intolerance, CKD3 (baseline sCr 1.4), PNA/Empyema, pericarditis and possible adrenal tumor/ pheochromocytoma. In 2019 STEMI and underwent cath which showed moderate TVD and normal EF - no intervention was diagnosed with pericarditis. Has close follow up with his cardiologist and nephrologist for new onset secondary HTN (reportedly SBP 200s). Imaging in 2022 noted 8mm left adrenal nodule (repeat 2023 showing stable appearance), pheochromocytoma workup indeterminate. MR abdomen cannot rule out neuroendocrine tumor. Was started on beta blocker and diuretics with improvement in blood pressure. Patient with progressive ROJO and intermittent lower extremity edema. Had CTA of coronaries to follow up on progression of his CAD which revealed total calcium score of 3065. Now s/p elective LHC today showing LM and severe TVD.    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  -Adjust meds pr CrCl  -Avoid nephrotoxic agents  -Continue GI ppx with Protonix  -SCDs while on bedrest  -DVT prophylaxis   -  1.25  -DC meds chest tube-  -DC HSQ  -DC Lasix    ====================== NEUROLOGY=====================  acetaminophen     Tablet .. 975 milliGRAM(s) Oral every 6 hours  gabapentin 100 milliGRAM(s) Oral every 8 hours  melatonin 5 milliGRAM(s) Oral at bedtime  oxyCODONE    IR 10 milliGRAM(s) Oral every 4 hours PRN Severe Pain (7 - 10)  oxyCODONE    IR 5 milliGRAM(s) Oral every 4 hours PRN Moderate Pain (4 - 6)    ==================== RESPIRATORY======================  Post op respiratory insufficiency  albuterol/ipratropium for Nebulization 3 milliLiter(s) Nebulizer every 6 hours  albuterol/ipratropium for Nebulization 3 milliLiter(s) Nebulizer every 6 hours PRN Shortness of Breath  budesonide  80 MICROgram(s)/formoterol 4.5 MICROgram(s) Inhaler 2 Puff(s) Inhalation two times a day    ====================CARDIOVASCULAR==================  Post op Hypovolemia  DOBUTamine Infusion 1.25 MICROgram(s)/kG/Min (1.95 mL/Hr) IV Continuous <Continuous>    ===================HEMATOLOGIC/ONC ===================  Monitor H&H/Plts    aspirin enteric coated 325 milliGRAM(s) Oral daily  clopidogrel Tablet 75 milliGRAM(s) Oral daily    ===================== RENAL =========================  Continue monitoring urine output, I&OS, BUN/Cr     ==================== GASTROINTESTINAL===================  ascorbic acid 500 milliGRAM(s) Oral two times a day  bisacodyl Suppository 10 milliGRAM(s) Rectal once  pantoprazole    Tablet 40 milliGRAM(s) Oral daily  polyethylene glycol 3350 17 Gram(s) Oral daily  senna 2 Tablet(s) Oral at bedtime  sodium chloride 0.9%. 1000 milliLiter(s) (10 mL/Hr) IV Continuous <Continuous>  sodium chloride 0.9%. 1000 milliLiter(s) (5 mL/Hr) IV Continuous <Continuous>    =======================    ENDOCRINE  =====================  dexAMETHasone  Injectable 4 milliGRAM(s) IV Push every 6 hours  insulin lispro (ADMELOG) corrective regimen sliding scale   SubCutaneous Before meals and at bedtime    ========================INFECTIOUS DISEASE================      -Monitor Neurologic status ,   -Head of the bed should remain elevated to 45 degrees,  -Monitor for arrhythmias and monitor parameters for organ perfusion,  -Glycemic control is satisfactory,  -Nutritional goals will be met using po eventually , insure adequate caloric intake and monitor the same ,  -Electrolytes have been repleted as necessary , pain control has been achieved  and wound care has been carried out ,  -Stress ulcer and VTE prophylaxis will be achieved,  -Agressive PT and early mobility and ambulation goals will be met,    I have spent 35 minutes providing acute care for this critically ill patient     Patient requires continuous monitoring with bedside rhythm monitoring, pulse ox monitoring, and intermittent blood gas analysis. Care plan discussed with ICU care team. Patient remained critical and at risk for life threatening decompensation.           
ART BLACK  MRN-[id number]    HPI:  84 male PMHx CAD, HLD, HTN, asthma, COPD, former smoker (4.5 cigars/daily, ~ 50years, quit 2018), Statin intolerance, CKD3 (baseline sCr 1.4), PNA/Empyema, pericarditis and possible adrenal tumor/ pheochromocytoma. In 2019 STEMI and underwent cath which showed moderate TVD and normal EF - no intervention was diagnosed with pericarditis. Has close follow up with his cardiologist and nephrologist for new onset secondary HTN (reportedly SBP 200s). Imaging in 2022 noted 8mm left adrenal nodule (repeat 2023 showing stable appearance), pheochromocytoma workup indeterminate. MR abdomen cannot rule out neuroendocrine tumor. Was started on beta blocker and diuretics with improvement in blood pressure. Patient with progressive ROJO and intermittent lower extremity edema. Had CTA of coronaries to follow up on progression of his CAD which revealed total calcium score of 3065. Now s/p elective LHC today showing LM and severe TVD. Transfered to [facility] for CABG eval.  Patient denies any chest pain, palpitations, HA, abd pain, n/v, dizziness, diaphoresis, syncope, changes in bowel bladder function, weight gain/loss. States "normal health."   (2024 18:30)      Surgery/Hospital Course:  PROCEDURES:  CABG, 2-3 vessels, with aortic valve replacement 2024     POST-OP DIAGNOSIS:  CAD (coronary artery disease)   Aortic stenosis     PRE-OP DIAGNOSIS:  CAD (coronary artery disease)   AS (aortic stenosis)     -  Today:  No acute events-    ICU Vital Signs Last 24 Hrs  T(C): 36.7 (2024 04:00), Max: 37 (2024 00:00)  T(F): 98 (2024 04:00), Max: 98.6 (2024 00:00)  HR: 67 (2024 10:45) (58 - 88)  BP: 134/81 (2024 10:45) (107/96 - 158/77)  BP(mean): 98 (2024 10:45) (76 - 102)  ABP: --  ABP(mean): --  RR: 20 (2024 10:45) (12 - 27)  SpO2: 100% (2024 10:45) (95% - 100%)    O2 Parameters below as of 2024 08:00  Patient On (Oxygen Delivery Method): nasal cannula  O2 Flow (L/min): 3          Physical Exam:  Gen: A&O   CNS: non focal 	  Neck: no JVD  RES : clear , no wheezing              CVS: Regular  rhythm. Normal S1/S2  Abd: Soft, non-distended. Bowel sounds present.  Skin: No rash.  Ext:  no edema    ============================I/O===========================   I&O's Detail    2024 07:01  -  2024 07:00  --------------------------------------------------------  IN:    Oral Fluid: 1015 mL  Total IN: 1015 mL    OUT:    Chest Tube (mL): 50 mL    Indwelling Catheter - Urethral (mL): 1615 mL  Total OUT: 1665 mL    Total NET: -650 mL      2024 07:01  -  2024 11:09  --------------------------------------------------------  IN:  Total IN: 0 mL    OUT:    Indwelling Catheter - Urethral (mL): 85 mL  Total OUT: 85 mL    Total NET: -85 mL        ============================ LABS =========================                        11.7   14.25 )-----------( 100      ( 2024 02:43 )             35.0     04-    138  |  101  |  49.8<H>  ----------------------------<  97  4.8   |  26.0  |  1.21    Ca    8.0<L>      2024 02:43  Mg     2.3                 Urinalysis Basic - ( 2024 02:43 )    Color: x / Appearance: x / SG: x / pH: x  Gluc: 97 mg/dL / Ketone: x  / Bili: x / Urobili: x   Blood: x / Protein: x / Nitrite: x   Leuk Esterase: x / RBC: x / WBC x   Sq Epi: x / Non Sq Epi: x / Bacteria: x      ======================Micro/Rad/Cardio=================  Culture: Reviewed   CXR: Reviewed  Echo:Reviewed  ======================================================  PAST MEDICAL & SURGICAL HISTORY:  CAD (coronary artery disease)      HLD (hyperlipidemia)      H/O secondary hypertension      Asthma      History of COPD      Former smoker      Statin intolerance      Stage 3 chronic kidney disease      PNA (pneumonia)      Empyema        ====================ASSESSMENT ==============  84 male PMHx CAD, HLD, HTN, asthma, COPD, former smoker (4.5 cigars/daily, ~ 50years, quit ), Statin intolerance, CKD3 (baseline sCr 1.4), PNA/Empyema, pericarditis and possible adrenal tumor/ pheochromocytoma. In 2019 STEMI and underwent cath which showed moderate TVD and normal EF - no intervention was diagnosed with pericarditis. Has close follow up with his cardiologist and nephrologist for new onset secondary HTN (reportedly SBP 200s). Imaging in 2022 noted 8mm left adrenal nodule (repeat 2023 showing stable appearance), pheochromocytoma workup indeterminate. MR abdomen cannot rule out neuroendocrine tumor. Was started on beta blocker and diuretics with improvement in blood pressure. Patient with progressive ROJO and intermittent lower extremity edema. Had CTA of coronaries to follow up on progression of his CAD which revealed total calcium score of 3065. Now s/p elective LHC today showing LM and severe TVD.    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, with aortic valve replacement 2024   ---Post op Hypovolemia  ---Post op respiratory insufficiency       Plan:-  -ContinueASA   -Continue Plavix  -Continue BB  as tolerated by HR and BP.  -Adjust meds pr CrCl  -Avoid nephrotoxic agents  -Continue GI ppx with Protonix  -SCDs while on bedrest  -Continue DVT prophylaxis   -DC Rojo  -DC   -Lactulose 30 g q 6h   -DC TPW    ====================== NEUROLOGY=====================  acetaminophen     Tablet .. 650 milliGRAM(s) Oral every 6 hours PRN Mild Pain (1 - 3)  gabapentin 100 milliGRAM(s) Oral every 8 hours  melatonin 5 milliGRAM(s) Oral at bedtime  oxyCODONE    IR 10 milliGRAM(s) Oral every 4 hours PRN Severe Pain (7 - 10)  oxyCODONE    IR 5 milliGRAM(s) Oral every 4 hours PRN Moderate Pain (4 - 6)    ==================== RESPIRATORY======================  Post op respiratory insufficiency  albuterol/ipratropium for Nebulization 3 milliLiter(s) Nebulizer every 6 hours PRN Shortness of Breath  albuterol/ipratropium for Nebulization 3 milliLiter(s) Nebulizer every 6 hours  budesonide  80 MICROgram(s)/formoterol 4.5 MICROgram(s) Inhaler 2 Puff(s) Inhalation two times a day    ====================CARDIOVASCULAR==================  Post op Hypovolemia  metoprolol tartrate 12.5 milliGRAM(s) Oral two times a day    ===================HEMATOLOGIC/ONC ===================  Monitor H&H/Plts    aspirin enteric coated 325 milliGRAM(s) Oral daily  clopidogrel Tablet 75 milliGRAM(s) Oral daily  heparin   Injectable 5000 Unit(s) SubCutaneous every 8 hours    ===================== RENAL =========================  Continue monitoring urine output, I&OS, BUN/Cr     ==================== GASTROINTESTINAL===================  ascorbic acid 500 milliGRAM(s) Oral two times a day  bisacodyl Suppository 10 milliGRAM(s) Rectal once  bisacodyl Suppository 10 milliGRAM(s) Rectal once  lactulose Syrup 10 Gram(s) Oral every 6 hours PRN Give q6 till BM  pantoprazole    Tablet 40 milliGRAM(s) Oral daily  polyethylene glycol 3350 17 Gram(s) Oral daily  senna 2 Tablet(s) Oral at bedtime    =======================    ENDOCRINE  =====================    ========================INFECTIOUS DISEASE================      -Monitor Neurologic status ,   -Head of the bed should remain elevated to 45 degrees,  -Monitor for arrhythmias and monitor parameters for organ perfusion,  -Glycemic control is satisfactory,  -Nutritional goals will be met using po eventually , insure adequate caloric intake and monitor the same ,  -Electrolytes have been repleted as necessary , pain control has been achieved  and wound care has been carried out ,  -Stress ulcer and VTE prophylaxis will be achieved,  -Agressive PT and early mobility and ambulation goals will be met,    I have spent 35 minutes providing acute care for this critically ill patient     Patient requires continuous monitoring with bedside rhythm monitoring, pulse ox monitoring, and intermittent blood gas analysis. Care plan discussed with ICU care team. Patient remained critical and at risk for life threatening decompensation.           
NEPHROLOGY INTERVAL HPI/OVERNIGHT EVENTS:    Feels OK   Comfortable   OOB to chair   Meds noted   UO 1.2 L +   Afebrile / VSS     MEDICATIONS  (STANDING):  albuterol/ipratropium for Nebulization 3 milliLiter(s) Nebulizer every 6 hours  aspirin enteric coated 325 milliGRAM(s) Oral daily  bisacodyl Suppository 10 milliGRAM(s) Rectal once  budesonide 160 MICROgram(s)/formoterol 4.5 MICROgram(s) Inhaler 2 Puff(s) Inhalation two times a day  clopidogrel Tablet 75 milliGRAM(s) Oral daily  heparin   Injectable 5000 Unit(s) SubCutaneous every 8 hours  melatonin 5 milliGRAM(s) Oral at bedtime  metoprolol tartrate 12.5 milliGRAM(s) Oral two times a day  pantoprazole    Tablet 40 milliGRAM(s) Oral daily  polyethylene glycol 3350 17 Gram(s) Oral daily  senna 2 Tablet(s) Oral at bedtime    MEDICATIONS  (PRN):  acetaminophen     Tablet .. 650 milliGRAM(s) Oral every 6 hours PRN Mild Pain (1 - 3)  albuterol/ipratropium for Nebulization 3 milliLiter(s) Nebulizer every 6 hours PRN Shortness of Breath  oxyCODONE    IR 10 milliGRAM(s) Oral every 4 hours PRN Severe Pain (7 - 10)  oxyCODONE    IR 5 milliGRAM(s) Oral every 4 hours PRN Moderate Pain (4 - 6)      Allergies    Levaquin (Unknown)  ampicillin (Unknown)  penicillin (Unknown)    Intolerances    Lipitor (Unknown)          Vital Signs Last 24 Hrs  T(C): 36.6 (24 Apr 2024 12:00), Max: 37.1 (24 Apr 2024 04:00)  T(F): 97.8 (24 Apr 2024 12:00), Max: 98.8 (24 Apr 2024 04:00)  HR: 79 (24 Apr 2024 14:00) (65 - 86)  BP: 118/62 (24 Apr 2024 14:00) (98/67 - 137/70)  BP(mean): 79 (24 Apr 2024 14:00) (75 - 98)  RR: 18 (24 Apr 2024 14:00) (12 - 31)  SpO2: 96% (24 Apr 2024 14:00) (92% - 99%)    Parameters below as of 24 Apr 2024 14:00  Patient On (Oxygen Delivery Method): nasal cannula  O2 Flow (L/min): 2    Daily     Daily   I&O's Detail    23 Apr 2024 07:01  -  24 Apr 2024 07:00  --------------------------------------------------------  IN:    Oral Fluid: 1673 mL  Total IN: 1673 mL    OUT:    Indwelling Catheter - Urethral (mL): 85 mL    Voided (mL): 1200 mL  Total OUT: 1285 mL    Total NET: 388 mL      24 Apr 2024 07:01  -  24 Apr 2024 15:44  --------------------------------------------------------  IN:    Oral Fluid: 480 mL  Total IN: 480 mL    OUT:    Voided (mL): 675 mL  Total OUT: 675 mL    Total NET: -195 mL        I&O's Summary    23 Apr 2024 07:01  -  24 Apr 2024 07:00  --------------------------------------------------------  IN: 1673 mL / OUT: 1285 mL / NET: 388 mL    24 Apr 2024 07:01  -  24 Apr 2024 15:44  --------------------------------------------------------  IN: 480 mL / OUT: 675 mL / NET: -195 mL        PHYSICAL EXAM:    EYES:  conjunctiva and sclera clear  NECK: Supple, No JVD/Bruit  NERVOUS SYSTEM:  A/O x3,   CHEST:  No rales or rhonchi , sternum clean   HEART:  RRR, No murmur  ABDOMEN: Soft, NT/ND BS+  EXTREMITIES:  No Edema; R leg no drainage     LABS:                        12.0   13.97 )-----------( 115      ( 24 Apr 2024 04:10 )             36.1     04-24    137  |  101  |  39.7<H>  ----------------------------<  98  4.6   |  25.0  |  1.12    Ca    7.8<L>      24 Apr 2024 04:10  Mg     2.4     04-24        Urinalysis Basic - ( 24 Apr 2024 04:10 )    Color: x / Appearance: x / SG: x / pH: x  Gluc: 98 mg/dL / Ketone: x  / Bili: x / Urobili: x   Blood: x / Protein: x / Nitrite: x   Leuk Esterase: x / RBC: x / WBC x   Sq Epi: x / Non Sq Epi: x / Bacteria: x      Magnesium: 2.4 mg/dL (04-24 @ 04:10)      < from: CT Chest No Cont (04.24.24 @ 08:59) >    ACC: [id number] EXAM:  CT CHEST   ORDERED BY: LULA GARCIA     PROCEDURE DATE:  04/24/2024          INTERPRETATION:  HISTORY: Admitting Dxs: I25.10 ATHSCL HEART DISEASE OF   [place]IVE. Evaluate lung fields.    EXAMINATION: CT CHEST was performed without IV contrast.    COMPARISON: 4/17/2024 CT chest.    FINDINGS:    Minimal tracheal secretions. Emphysema. Biapical opacities are without   significant change compared to 8/3/2019 CT chest and likely represents   scarring. Bibasilar atelectasis.    Small right pleural effusion. Small left pleural effusion with hyperdense   material posteriorly compatible with blood products. Left anterior chest   wall collection with hyperdense components measuring 12 cm craniocaudal x   8 cm across x 3 cm AP (image 74, series 3 and sagittal series image 83)   likely represents extrapleural fluid collection containing blood.    Postoperative changes related to aortic valve replacement and CABG. Small   circumferential pericardial effusion with Hounsfield units of 27.   Anterior mediastinal fluid collection with Hounsfield units of 24.    Biatrial cardiac enlargement. Dilated ascending thoracic aorta measures 4   cm. No large mediastinal nodes.    Bilateral renal cysts. Adrenal glands are not adequately assessed on this   exam. Median sternotomy and presence of sternal wires. Midline and   anterior cutaneous surgical staples.      IMPRESSION:.    Left anterior fluid collection measuring 12 x 8 x 3 cm, which is likely   extrapleural in anatomic location and contains hemorrhage.    Small left hemothorax.    Small right pleural effusion.    Interval aortic valve replacement and CABG with associated small   circumferential hemopericardium and hematoma tracking along the anterior   mediastinal fat.    --- End of Report ---            LATASHA CALDERÓN MD; Attending Radiologist  This document has been electronically sig    < end of copied text >    < from: US Renal (04.19.24 @ 10:09) >  C: [id number] EXAM:  US KIDNEY(S)   ORDERED BY: EFRA PEPPER     PROCEDURE DATE:  04/19/2024          INTERPRETATION:  CLINICAL INFORMATION: Chronic renal disease.    COMPARISON: None available.    TECHNIQUE: Sonography of the kidneys and bladder.    FINDINGS:  Right kidney: 7.6 cm. No renal mass, hydronephrosis or calculi. Prominent   corticomedullary differentiation. Multiple simple cysts.    Left kidney: 9.0 cm. No renal mass, hydronephrosis or calculi. Prominent   corticomedullary differentiation. Multiple simple cysts.    Urinary bladder: Decompressed around an indwelling catheter cannot be   evaluated.    IMPRESSION:  Renal parenchymal disease without hydronephrosis.        --- End of Report ---            MARYLU GORDON MD; AttendingRadiologist  This document has been electronically signed. Apr 19 2024  4:42PM    < end of copied text >    RADIOLOGY & ADDITIONAL TESTS:  
Patient is a 84y old  Male who presents with a chief complaint of [place] [facility] [place] (22 Apr 2024 19:23)      HPI:  HPI:  84M with hx of CAD, HLD, HTN, asthma, COPD, former smoker (4.5 cigars/daily, ~ 50years, quit 2018), Statin intolerance, CKD3 (baseline sCr 1.4), PNA/Empyema, pericarditis and possible adrenal tumor/ pheochromocytoma with progressively worsening dyspnea and lower extremity edema underwent CTA and found to have elevated calcium score and underwent LHC demonstrating LM and severe TVD. Pt underwent CABG 4/18. Pt was extubated 4/19 initially to nasal cannula but required escalation to high flow.   Pt typically follows with Dr. Solorio and maintained on Breo and albuterol prn. Pt denies any respiratory complaint prior to presentation. Denies any recent fevers/chills.     Interval hx:   No acute events overnight. Transitioned to nasal cannula yesterday and maintaining Fio2 on 4L NC. No recurrent fevers but sill receiving standing Tylenol. Pt reports he feels well. Denies any shortness of breath. Reports only occasional cough with whitish sputum. Using incentive beverly and achieving 1000cc. Ambulated earlier. Denies chest pain.      PAST MEDICAL & SURGICAL HISTORY:  CAD (coronary artery disease)      HLD (hyperlipidemia)      H/O secondary hypertension      Asthma      History of COPD      Former smoker      Statin intolerance      Stage 3 chronic kidney disease      PNA (pneumonia)      Empyema      Medications:    metoprolol tartrate 12.5 milliGRAM(s) Oral two times a day    albuterol/ipratropium for Nebulization 3 milliLiter(s) Nebulizer every 6 hours PRN  albuterol/ipratropium for Nebulization 3 milliLiter(s) Nebulizer every 6 hours  budesonide  80 MICROgram(s)/formoterol 4.5 MICROgram(s) Inhaler 2 Puff(s) Inhalation two times a day    acetaminophen     Tablet .. 650 milliGRAM(s) Oral every 6 hours PRN  gabapentin 100 milliGRAM(s) Oral every 8 hours  melatonin 5 milliGRAM(s) Oral at bedtime  oxyCODONE    IR 10 milliGRAM(s) Oral every 4 hours PRN  oxyCODONE    IR 5 milliGRAM(s) Oral every 4 hours PRN      aspirin enteric coated 325 milliGRAM(s) Oral daily  clopidogrel Tablet 75 milliGRAM(s) Oral daily    bisacodyl Suppository 10 milliGRAM(s) Rectal once  pantoprazole    Tablet 40 milliGRAM(s) Oral daily  polyethylene glycol 3350 17 Gram(s) Oral daily  senna 2 Tablet(s) Oral at bedtime      insulin lispro (ADMELOG) corrective regimen sliding scale   SubCutaneous Before meals and at bedtime    ascorbic acid 500 milliGRAM(s) Oral two times a day  sodium chloride 0.9%. 1000 milliLiter(s) IV Continuous <Continuous>  sodium chloride 0.9%. 1000 milliLiter(s) IV Continuous <Continuous>      chlorhexidine 2% Cloths 1 Application(s) Topical two times a day            ICU Vital Signs Last 24 Hrs  T(C): 36.7 (22 Apr 2024 21:00), Max: 36.7 (22 Apr 2024 00:00)  T(F): 98.1 (22 Apr 2024 21:00), Max: 98.1 (22 Apr 2024 00:00)  HR: 58 (22 Apr 2024 21:00) (58 - 80)  BP: 123/73 (22 Apr 2024 21:00) (103/59 - 150/68)  BP(mean): 88 (22 Apr 2024 21:00) (73 - 97)  ABP: --  ABP(mean): --  RR: 18 (22 Apr 2024 21:00) (12 - 25)  SpO2: 99% (22 Apr 2024 21:00) (94% - 100%)    O2 Parameters below as of 22 Apr 2024 21:00  Patient On (Oxygen Delivery Method): nasal cannula  O2 Flow (L/min): 4              I&O's Detail    21 Apr 2024 07:01  -  22 Apr 2024 07:00  --------------------------------------------------------  IN:    DOBUTamine: 14 mL    Oral Fluid: 720 mL  Total IN: 734 mL    OUT:    Chest Tube (mL): 10 mL    Chest Tube (mL): 190 mL    Indwelling Catheter - Urethral (mL): 3760 mL  Total OUT: 3960 mL    Total NET: -3226 mL      22 Apr 2024 07:01  -  22 Apr 2024 21:09  --------------------------------------------------------  IN:    Oral Fluid: 775 mL  Total IN: 775 mL    OUT:    Chest Tube (mL): 50 mL    Indwelling Catheter - Urethral (mL): 865 mL  Total OUT: 915 mL    Total NET: -140 mL            LABS:                        11.8   15.79 )-----------( 89       ( 22 Apr 2024 02:40 )             34.1     04-22    136  |  100  |  54.1<H>  ----------------------------<  125<H>  4.2   |  26.0  |  1.58<H>    Ca    8.0<L>      22 Apr 2024 02:40  Mg     2.4     04-22            CAPILLARY BLOOD GLUCOSE      POCT Blood Glucose.: 135 mg/dL (22 Apr 2024 17:02)      Urinalysis Basic - ( 22 Apr 2024 02:40 )    Color: x / Appearance: x / SG: x / pH: x  Gluc: 125 mg/dL / Ketone: x  / Bili: x / Urobili: x   Blood: x / Protein: x / Nitrite: x   Leuk Esterase: x / RBC: x / WBC x   Sq Epi: x / Non Sq Epi: x / Bacteria: x      CULTURES:      Physical Examination:    General:  awake, alert, in NAD      HEENT: NC/AT, anicteric, MMM      PULM: diminished at the bases, no accessory muscle use       NECK: Supple,  trachea midline    CVS: S1S2, RRR    ABD: Soft, nondistended, nontender, normoactive bowel sounds    EXT: trace edema, nontender    SKIN: Warm and well perfused, no rashes noted    NEURO: Alert, oriented, interactive, nonfocal    ROS: negative except as per HPI    RADIOLOGY:   < from: Xray Chest 1 View- PORTABLE-Urgent (Xray Chest 1 View- PORTABLE-Urgent .) (04.22.24 @ 14:43) >  INTERPRETATION:  Chest one view 4/22/2024 4:02 AM    HISTORY: Postop    COMPARISON STUDY: 4/21/2024    Frontal expiratory view of the chest shows the heart to be similar in   size. Left chest tube is again noted.    The lungs show partial clearing of the left lung with progression of   small right effusion. Small left effusion is present and there is no   evidence of pneumothorax.    Chest one view 4/22/2024 2:22 PM  Compared to the prior study, left chest tube has been removed. No   pneumothorax.    IMPRESSION:  No pneumothorax.    < end of copied text >      < from: CT Chest No Cont (04.17.24 @ 19:31) >  FINDINGS:    LUNGS/AIRWAYS/PLEURA: Small volume secretions in the trachea. Emphysema.   New left upper lobe 5 mm solid nodule (3-32). Increased size of 1.6 cm   right apical nodular opacity (3-17), prior 1.2 cm. Unchanged 0.7 cm   groundglass nodule in the apical left upper lobe. Linear atelectasis in   the lingula. No pleural effusion.    LYMPH NODES/MEDIASTINUM: No lymphadenopathy.    HEART/VASCULATURE: Multichamber cardiac enlargement. Multivessel coronary   artery calcified plaque. Calcified aortic valve. No pericardial effusion.   Approximate 4 cm ascending aorta at the level of the right pulmonary   artery.    UPPER ABDOMEN: Bilateral renal cysts. Hyper dense material within both   ureters.    BONES/SOFT TISSUES: Degenerative changes of the spine. Multiple old rib   fractures. Unchanged multiple compression fractures.        IMPRESSION:    Since 8/3/2019:    New left upper lobe 5 mm solidnodule, and mild increased size of a 1.6   cm nodular opacity in the right upper lobe. These are indeterminate.   Recommend surveillance CT chest in 6 months.    Emphysema.    < end of copied text >    
Patient seen and examined    I&O's Summary    19 Apr 2024 07:01  -  20 Apr 2024 07:00  --------------------------------------------------------  IN: 1800.3 mL / OUT: 1750 mL / NET: 50.3 mL    20 Apr 2024 07:01  -  20 Apr 2024 18:00  --------------------------------------------------------  IN: 965.2 mL / OUT: 1545 mL / NET: -579.8 mL        REVIEW OF SYSTEMS: OOB/Ch, ate easily    CONSTITUTIONAL: No F/C  RESPIRATORY: No cough,  No SOB  CARDIOVASCULAR: No CP ex with coughing/-palpitations,    GASTROINTESTINAL: No abdominal pain  or NVD   GENITOURINARY: No UTI sx  NEUROLOGICAL: No headaches, NO wk/numbness  MUSCULOSKELETAL:   EXTREMITIES : no swelling,    Vital Signs Last 24 Hrs  T(C): 37 (20 Apr 2024 16:00), Max: 38.2 (19 Apr 2024 23:00)  T(F): 98.6 (20 Apr 2024 16:00), Max: 100.8 (19 Apr 2024 23:00)  HR: 75 (20 Apr 2024 16:00) (63 - 101)  BP: 140/70 (20 Apr 2024 16:00) (121/70 - 153/77)  BP(mean): 91 (20 Apr 2024 16:00) (80 - 98)  RR: 20 (20 Apr 2024 16:00) (15 - 33)  SpO2: 94% (20 Apr 2024 17:33) (91% - 98%)    Parameters below as of 20 Apr 2024 17:33  Patient On (Oxygen Delivery Method): nasal cannula w/ humidification, 6 LPM        PHYSICAL EXAM:    GENERAL: NAD, drains noted  EYES:  conjunctiva and sclera clear  NECK: Supple, No JVD/Bruit  NERVOUS SYSTEM:  A/O x3,   CHEST:  No rales or rhonchi, incision covered  HEART:  RRR, No murmur  ABDOMEN: Soft, NT/ND BS+  EXTREMITIES:  No Edema;  SKIN: No rashes    LABS:                          10.7   14.21 )-----------( 101      ( 20 Apr 2024 02:00 )             30.3     04-20    136  |  99  |  43.1<H>  ----------------------------<  167<H>  4.7   |  24.0  |  1.72<H>    Ca    8.2<L>      20 Apr 2024 11:55  Phos  3.6     04-18  Mg     2.1     04-20    TPro  4.7<L>  /  Alb  3.2<L>  /  TBili  1.6  /  DBili  x   /  AST  66<H>  /  ALT  25  /  AlkPhos  46  04-19      MEDICATIONS  (STANDING):  acetaminophen     Tablet ..  albuterol/ipratropium for Nebulization PRN  albuterol/ipratropium for Nebulization  ascorbic acid  aspirin enteric coated  bisacodyl Suppository  budesonide  80 MICROgram(s)/formoterol 4.5 MICROgram(s) Inhaler  chlorhexidine 2% Cloths  clopidogrel Tablet  dexAMETHasone  Injectable  DOBUTamine Infusion  furosemide   Injectable  gabapentin  heparin   Injectable  insulin lispro (ADMELOG) corrective regimen sliding scale  melatonin  oxyCODONE    IR PRN  oxyCODONE    IR PRN  pantoprazole    Tablet  polyethylene glycol 3350  senna  sodium chloride 0.9%.  sodium chloride 0.9%.      
Patient seen and examined    I&O's Summary    20 Apr 2024 07:01  -  21 Apr 2024 07:00  --------------------------------------------------------  IN: 1106.6 mL / OUT: 3625 mL / NET: -2518.4 mL    21 Apr 2024 07:01  -  21 Apr 2024 18:15  --------------------------------------------------------  IN: 252 mL / OUT: 1935 mL / NET: -1683 mL        REVIEW OF SYSTEMS: eating well    CONSTITUTIONAL: No F/C  RESPIRATORY: No cough,  No SOB  CARDIOVASCULAR: No CP ex on cough/- palpitations,    GASTROINTESTINAL: No abdominal pain  or NVD   GENITOURINARY: No UTI sx  NEUROLOGICAL: No headaches, NO wk/numbness  MUSCULOSKELETAL:   EXTREMITIES : no swelling,    Vital Signs Last 24 Hrs  T(C): 36.3 (21 Apr 2024 16:05), Max: 36.7 (20 Apr 2024 20:00)  T(F): 97.4 (21 Apr 2024 16:05), Max: 98 (20 Apr 2024 20:00)  HR: 93 (21 Apr 2024 17:00) (57 - 93)  BP: 134/73 (21 Apr 2024 17:00) (125/80 - 150/71)  BP(mean): 88 (21 Apr 2024 17:00) (84 - 111)  RR: 23 (21 Apr 2024 17:00) (11 - 29)  SpO2: 91% (21 Apr 2024 17:00) (91% - 98%)    Parameters below as of 21 Apr 2024 17:00  Patient On (Oxygen Delivery Method): nasal cannula  O2 Flow (L/min): 4      PHYSICAL EXAM:    GENERAL: NAD,   EYES:  conjunctiva and sclera clear  NECK: Supple, No JVD/Bruit  NERVOUS SYSTEM:  A/O x3,   CHEST:  No rales or rhonchi  HEART:  RRR, No murmur  ABDOMEN: Soft, NT/ND BS+  EXTREMITIES:  No Edema;  SKIN: No rashes    LABS:                          11.0   14.08 )-----------( 83       ( 21 Apr 2024 02:15 )             31.9     04-21    137  |  100  |  54.6<H>  ----------------------------<  135<H>  4.7   |  26.0  |  1.93<H>    Ca    8.0<L>      21 Apr 2024 02:15  Mg     2.6     04-21        MEDICATIONS  (STANDING):  acetaminophen     Tablet ..  albuterol/ipratropium for Nebulization PRN  albuterol/ipratropium for Nebulization  ascorbic acid  aspirin enteric coated  bisacodyl Suppository  budesonide  80 MICROgram(s)/formoterol 4.5 MICROgram(s) Inhaler  chlorhexidine 2% Cloths  clopidogrel Tablet  gabapentin  insulin lispro (ADMELOG) corrective regimen sliding scale  melatonin  oxyCODONE    IR PRN  oxyCODONE    IR PRN  pantoprazole    Tablet  polyethylene glycol 3350  senna  sodium chloride 0.9%.  sodium chloride 0.9%.      
Patient seen and examined.  Denies CP, SOB, N/V.    T(C): 36.7 (04-21-24 @ 00:00)  T(F): 98 (04-21-24 @ 00:00)  HR: 60 (04-21-24 @ 01:00)  BP: 137/70 (04-21-24 @ 01:00)  BP(mean): 90 (04-21-24 @ 01:00)  ABP: 104/81 (04-20-24 @ 06:00)  ABP(mean): 90 (04-20-24 @ 06:00)  RR: 12 (04-21-24 @ 01:00)  SpO2: 95% (04-21-24 @ 02:12)  Wt(kg): --  CVP(mm Hg): 11 (04-20-24 @ 05:00)  CI: 2.7 (04-20-24 @ 04:00)  PA: --  PA(mean): --  PA(direct): --  SVRI: 2337 (04-20-24 @ 04:00)      Physical Exam:  Gen: A&Ox3  Pulm:  CTA b/l, no r/r/w  CV:  S1S2, RRR, no m/r/g  Abd: +BS, soft, NT, ND  Ext:  +DP b/l, 2+pitting edema b/l LE   Incision:  c/d/i no click, no exudate    I&O's Detail    19 Apr 2024 07:01  -  20 Apr 2024 07:00  --------------------------------------------------------  IN:    Albumin 5%  - 250 mL: 250 mL    DOBUTamine: 179.4 mL    Insulin: 3 mL    IV PiggyBack: 100 mL    IV PiggyBack: 250 mL    IV PiggyBack: 100 mL    Norepinephrine: 2.9 mL    Oral Fluid: 555 mL    sodium chloride 0.9%: 120 mL    sodium chloride 0.9%: 240 mL  Total IN: 1800.3 mL    OUT:    Chest Tube (mL): 490 mL    Chest Tube (mL): 320 mL    EPINEPHrine: 0 mL    Indwelling Catheter - Urethral (mL): 940 mL    Vasopressin: 0 mL  Total OUT: 1750 mL    Total NET: 50.3 mL      20 Apr 2024 07:01  -  21 Apr 2024 02:15  --------------------------------------------------------  IN:    DOBUTamine: 93.6 mL    IV PiggyBack: 250 mL    Oral Fluid: 600 mL    sodium chloride 0.9%: 90 mL  Total IN: 1033.6 mL    OUT:    Chest Tube (mL): 75 mL    Chest Tube (mL): 95 mL    Indwelling Catheter - Urethral (mL): 2515 mL  Total OUT: 2685 mL    Total NET: -1651.4 mL                              10.7   14.21 )-----------( 101      ( 20 Apr 2024 02:00 )             30.3   04-20    136  |  99  |  43.1<H>  ----------------------------<  167<H>  4.7   |  24.0  |  1.72<H>    Ca    8.2<L>      20 Apr 2024 11:55  Mg     2.1     04-20      ABG - ( 19 Apr 2024 10:31 )  pH: 7.440 /  pCO2: 38    /  pO2: 58    / HCO3: 26    / Base Excess: 1.6   /  SaO2: 93.6 /  Lactate: x        CAPILLARY BLOOD GLUCOSE      POCT Blood Glucose.: 127 mg/dL (20 Apr 2024 21:44)        Medications:  acetaminophen     Tablet .. 975 milliGRAM(s) Oral every 6 hours  albuterol/ipratropium for Nebulization 3 milliLiter(s) Nebulizer every 6 hours PRN  albuterol/ipratropium for Nebulization 3 milliLiter(s) Nebulizer every 6 hours  ascorbic acid 500 milliGRAM(s) Oral two times a day  aspirin enteric coated 325 milliGRAM(s) Oral daily  bisacodyl Suppository 10 milliGRAM(s) Rectal once  budesonide  80 MICROgram(s)/formoterol 4.5 MICROgram(s) Inhaler 2 Puff(s) Inhalation two times a day  chlorhexidine 2% Cloths 1 Application(s) Topical two times a day  clopidogrel Tablet 75 milliGRAM(s) Oral daily  dexAMETHasone  Injectable 4 milliGRAM(s) IV Push every 6 hours  DOBUTamine Infusion 3 MICROgram(s)/kG/Min IV Continuous <Continuous>  furosemide   Injectable 40 milliGRAM(s) IV Push two times a day  gabapentin 100 milliGRAM(s) Oral every 8 hours  heparin   Injectable 5000 Unit(s) SubCutaneous every 8 hours  insulin lispro (ADMELOG) corrective regimen sliding scale   SubCutaneous Before meals and at bedtime  melatonin 5 milliGRAM(s) Oral at bedtime  oxyCODONE    IR 10 milliGRAM(s) Oral every 4 hours PRN  oxyCODONE    IR 5 milliGRAM(s) Oral every 4 hours PRN  pantoprazole    Tablet 40 milliGRAM(s) Oral daily  polyethylene glycol 3350 17 Gram(s) Oral daily  senna 2 Tablet(s) Oral at bedtime  sodium chloride 0.9%. 1000 milliLiter(s) IV Continuous <Continuous>  sodium chloride 0.9%. 1000 milliLiter(s) IV Continuous <Continuous>      CXR: pending       
PULMONARY PROGRESS NOTE      ART BLACK  [id number]    Patient is a 84y old  Male who presents with a chief complaint of [place] from [facility] [place] (21 Apr 2024 10:53)        INTERVAL HPI/OVERNIGHT EVENTS:  Pt seen and examined at the bedside. Denies worsening SOB, no worsening cough or chest pain.    MEDICATIONS  (STANDING):  acetaminophen     Tablet .. 975 milliGRAM(s) Oral every 6 hours  albuterol/ipratropium for Nebulization 3 milliLiter(s) Nebulizer every 6 hours  ascorbic acid 500 milliGRAM(s) Oral two times a day  aspirin enteric coated 325 milliGRAM(s) Oral daily  bisacodyl Suppository 10 milliGRAM(s) Rectal once  budesonide  80 MICROgram(s)/formoterol 4.5 MICROgram(s) Inhaler 2 Puff(s) Inhalation two times a day  chlorhexidine 2% Cloths 1 Application(s) Topical two times a day  clopidogrel Tablet 75 milliGRAM(s) Oral daily  DOBUTamine Infusion 1.25 MICROgram(s)/kG/Min (1.95 mL/Hr) IV Continuous <Continuous>  gabapentin 100 milliGRAM(s) Oral every 8 hours  insulin lispro (ADMELOG) corrective regimen sliding scale   SubCutaneous Before meals and at bedtime  melatonin 5 milliGRAM(s) Oral at bedtime  pantoprazole    Tablet 40 milliGRAM(s) Oral daily  polyethylene glycol 3350 17 Gram(s) Oral daily  senna 2 Tablet(s) Oral at bedtime  sodium chloride 0.9%. 1000 milliLiter(s) (10 mL/Hr) IV Continuous <Continuous>  sodium chloride 0.9%. 1000 milliLiter(s) (5 mL/Hr) IV Continuous <Continuous>    MEDICATIONS  (PRN):  albuterol/ipratropium for Nebulization 3 milliLiter(s) Nebulizer every 6 hours PRN Shortness of Breath  oxyCODONE    IR 10 milliGRAM(s) Oral every 4 hours PRN Severe Pain (7 - 10)  oxyCODONE    IR 5 milliGRAM(s) Oral every 4 hours PRN Moderate Pain (4 - 6)    Allergies    Levaquin (Unknown)  ampicillin (Unknown)  penicillin (Unknown)    Intolerances    Lipitor (Unknown)    PAST MEDICAL & SURGICAL HISTORY:  CAD (coronary artery disease)      HLD (hyperlipidemia)      H/O secondary hypertension      Asthma      History of COPD      Former smoker      Statin intolerance      Stage 3 chronic kidney disease      PNA (pneumonia)      Empyema            REVIEW OF SYSTEMS:  Negative except as noted in HPI      Vital Signs Last 24 Hrs  T(C): 36.3 (21 Apr 2024 16:05), Max: 36.7 (20 Apr 2024 20:00)  T(F): 97.4 (21 Apr 2024 16:05), Max: 98 (20 Apr 2024 20:00)  HR: 64 (21 Apr 2024 15:00) (57 - 77)  BP: 132/69 (21 Apr 2024 15:00) (122/64 - 150/71)  BP(mean): 88 (21 Apr 2024 15:00) (80 - 101)  RR: 29 (21 Apr 2024 15:00) (11 - 29)  SpO2: 96% (21 Apr 2024 15:00) (91% - 98%)    Parameters below as of 21 Apr 2024 15:00  Patient On (Oxygen Delivery Method): nasal cannula  O2 Flow (L/min): 4        PHYSICAL EXAMINATION:  GEN: In no apparent distress  HEENT: NC/AT  NECK: Supple, non-tender  CV: +S1, S2  RESPIRATORY: CTA anteriorly, moderate respiratory effort  ABDOMEN: Soft, non-tender  EXTREMITIES: No pedal edema B/L  SKIN: No open wounds  PSYCH: Normal affect      LABS:                        11.0   14.08 )-----------( 83       ( 21 Apr 2024 02:15 )             31.9     04-21    137  |  100  |  54.6<H>  ----------------------------<  135<H>  4.7   |  26.0  |  1.93<H>    Ca    8.0<L>      21 Apr 2024 02:15  Mg     2.6     04-21        Urinalysis Basic - ( 21 Apr 2024 02:15 )    Color: x / Appearance: x / SG: x / pH: x  Gluc: 135 mg/dL / Ketone: x  / Bili: x / Urobili: x   Blood: x / Protein: x / Nitrite: x   Leuk Esterase: x / RBC: x / WBC x   Sq Epi: x / Non Sq Epi: x / Bacteria: x                      MICROBIOLOGY:  MRSA/MSSA PCR (04.17.24 @ 21:30)    MRSA PCR Result.: NotDetec: NOT DETECTED RESULT: MRSA and MSSA not detected  A result of MRSA and MSSA not detected does not preclude the possibility  of colonization with MRSA or MSSA. Negative results may occur from  improper specimen collection, handling or storage, or because the number  of organisms in the specimen is below the analytical sensitivity of the  test.  INDETERMINATE RESULT: Indeterminate  An indeterminate result may be due to:  1) levels of MRSA DNA and/or MSSA DNA present below the established  definitivepositive detection valuei 2) the presence of amplification  inhibitors in the samplei 3) interfering substances with no MRSA DNA  and/or MSSA DNA present. This result does not preclude the possibility of  infection with MRSA or the presence of MSSA. Please submit an additional  specimen for repeat testing.  DETECTED RESULT: MRSA DNA detected and/or MSSA DNA detected.  A positive test result does not necessarily indicate the presence of  viable organisms and therefore, does not necessarily indicate treatment  eradication failure since DNA may persist.  The results of this test should be interpreted with consideration of all  clinical and laboratory findings. This test determines colonization  status at a given time and is not intended to identify patients with MRSA  infection or the presence of MSSA.   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  COMPARISON: 4/20/2024.  FINDINGS/  IMPRESSION:  Mediastinal drains. Left chest tube.  HEART:  Enlarged. Prosthetic aortic valve.  LUNGS: Small right effusion. Interstitial edema. Patchy left upper lobe   infiltrate. No pneumothorax..  BONES: sternotomy wires    --- End of Report ---            THALIA ARANDA MD; Attending Interventional Radiologist  This document has been electronically signed. Apr 21 2024 10:20AM    < end of copied text >      ECHO:  
Patient is a 84y old  Male who presents with a chief complaint of Transfer from [facility] [place] for CABG eval (24 Apr 2024 15:44)      Interval hx:  Denies any subjective shortness of breath. Repeat CT scan with L hemothorax, L anterior fluid collection and small R pleural effusion not amenable to drainage. Has been weaned to 2L NC. Underwent home O2 eval and qualified. Reports minimal cough. Remains afebrile. Denies chest pain. Denies LE pain. Diuresing well     PAST MEDICAL & SURGICAL HISTORY:  CAD (coronary artery disease)      HLD (hyperlipidemia)      H/O secondary hypertension      Asthma      History of COPD      Former smoker      Statin intolerance      Stage 3 chronic kidney disease      PNA (pneumonia)      Empyema            Medications:    metoprolol tartrate 12.5 milliGRAM(s) Oral two times a day    albuterol/ipratropium for Nebulization 3 milliLiter(s) Nebulizer every 6 hours  albuterol/ipratropium for Nebulization 3 milliLiter(s) Nebulizer every 6 hours PRN  budesonide 160 MICROgram(s)/formoterol 4.5 MICROgram(s) Inhaler 2 Puff(s) Inhalation two times a day    acetaminophen     Tablet .. 650 milliGRAM(s) Oral every 6 hours PRN  melatonin 5 milliGRAM(s) Oral at bedtime  oxyCODONE    IR 10 milliGRAM(s) Oral every 4 hours PRN  oxyCODONE    IR 5 milliGRAM(s) Oral every 4 hours PRN      aspirin enteric coated 325 milliGRAM(s) Oral daily  clopidogrel Tablet 75 milliGRAM(s) Oral daily  heparin   Injectable 5000 Unit(s) SubCutaneous every 8 hours    bisacodyl Suppository 10 milliGRAM(s) Rectal once  pantoprazole    Tablet 40 milliGRAM(s) Oral daily  polyethylene glycol 3350 17 Gram(s) Oral daily  senna 2 Tablet(s) Oral at bedtime                      ICU Vital Signs Last 24 Hrs  T(C): 37.2 (24 Apr 2024 15:50), Max: 37.2 (24 Apr 2024 15:50)  T(F): 99 (24 Apr 2024 15:50), Max: 99 (24 Apr 2024 15:50)  HR: 76 (24 Apr 2024 20:00) (65 - 93)  BP: 112/80 (24 Apr 2024 20:00) (98/67 - 137/70)  BP(mean): 91 (24 Apr 2024 20:00) (75 - 105)  ABP: --  ABP(mean): --  RR: 24 (24 Apr 2024 20:00) (12 - 31)  SpO2: 95% (24 Apr 2024 20:00) (92% - 99%)    O2 Parameters below as of 24 Apr 2024 20:00  Patient On (Oxygen Delivery Method): nasal cannula  O2 Flow (L/min): 2              I&O's Detail    23 Apr 2024 07:01  -  24 Apr 2024 07:00  --------------------------------------------------------  IN:    Oral Fluid: 1673 mL  Total IN: 1673 mL    OUT:    Indwelling Catheter - Urethral (mL): 85 mL    Voided (mL): 1200 mL  Total OUT: 1285 mL    Total NET: 388 mL      24 Apr 2024 07:01  -  24 Apr 2024 20:23  --------------------------------------------------------  IN:    Oral Fluid: 720 mL  Total IN: 720 mL    OUT:    Voided (mL): 875 mL  Total OUT: 875 mL    Total NET: -155 mL            LABS:                        12.0   13.97 )-----------( 115      ( 24 Apr 2024 04:10 )             36.1     04-24    136  |  99  |  43.7<H>  ----------------------------<  123<H>  4.1   |  27.0  |  1.22    Ca    8.1<L>      24 Apr 2024 15:18  Mg     2.4     04-24            CAPILLARY BLOOD GLUCOSE      POCT Blood Glucose.: 94 mg/dL (23 Apr 2024 07:08)      Urinalysis Basic - ( 24 Apr 2024 15:18 )    Color: x / Appearance: x / SG: x / pH: x  Gluc: 123 mg/dL / Ketone: x  / Bili: x / Urobili: x   Blood: x / Protein: x / Nitrite: x   Leuk Esterase: x / RBC: x / WBC x   Sq Epi: x / Non Sq Epi: x / Bacteria: x      CULTURES:      Physical Examination:    General: awake, alert, in NAD    HEENT: NC/AT, anicteric, MMM    PULM: diminished through the L lung and R base, no accessory muscle use     NECK: Supple, trachea midline    CVS: S1S2, RRR    ABD: Soft, nondistended, nontender, normoactive bowel sounds    EXT: No edema, nontender    SKIN: Warm and well perfused, no rashes noted    NEURO: Alert, oriented, interactive, nonfocal    ROS: negative except as per HPI    RADIOLOGY:   < from: CT Chest No Cont (04.24.24 @ 08:59) >  FINDINGS:    Minimal tracheal secretions. Emphysema. Biapical opacities are without   significant change compared to 8/3/2019 CT chest and likely represents   scarring. Bibasilar atelectasis.    Small right pleural effusion. Small left pleural effusion with hyperdense   material posteriorly compatible with blood products. Left anterior chest   wall collection with hyperdense components measuring 12 cm craniocaudal x   8 cm across x 3 cm AP (image 74, series 3 and sagittal series image 83)   likely represents extrapleural fluid collection containing blood.    Postoperative changes related to aortic valve replacement and CABG. Small   circumferential pericardial effusion with Hounsfield units of 27.   Anterior mediastinal fluid collection with Hounsfield units of 24.    Biatrial cardiac enlargement. Dilated ascending thoracic aorta measures 4   cm. No large mediastinal nodes.    Bilateral renal cysts. Adrenal glands are not adequately assessed on this   exam. Median sternotomy and presence of sternal wires. Midline and   anterior cutaneous surgical staples.      IMPRESSION:.    Left anterior fluid collection measuring 12 x 8 x 3 cm, which is likely   extrapleural in anatomic location and contains hemorrhage.    Small left hemothorax.    Small right pleural effusion.    Interval aortic valve replacement and CABG with associated small   circumferential hemopericardium and hematoma tracking along the anterior   mediastinal fat.    < end of copied text >  
Patient seen and examined    I&O's Summary    18 Apr 2024 07:01  -  19 Apr 2024 07:00  --------------------------------------------------------  IN: 4143.5 mL / OUT: 2100 mL / NET: 2043.5 mL    19 Apr 2024 07:01  -  19 Apr 2024 18:57  --------------------------------------------------------  IN: 1393.9 mL / OUT: 845 mL / NET: 548.9 mL        REVIEW OF SYSTEMS: OOb/Ch, ambulated    CONSTITUTIONAL: No F/C  RESPIRATORY: No cough,  No SOB on O2  CARDIOVASCULAR: No CP/palpitations,    GASTROINTESTINAL: No abdominal pain  or NVD   ,    Vital Signs Last 24 Hrs  T(C): 38.2 (19 Apr 2024 18:00), Max: 38.2 (19 Apr 2024 18:00)  T(F): 100.8 (19 Apr 2024 18:00), Max: 100.8 (19 Apr 2024 18:00)  HR: 106 (19 Apr 2024 18:00) (58 - 106)  BP: 131/79 (19 Apr 2024 18:00) (77/51 - 149/64)  BP(mean): 94 (19 Apr 2024 18:00) (60 - 104)  RR: 23 (19 Apr 2024 18:00) (10 - 32)  SpO2: 91% (19 Apr 2024 18:00) (88% - 100%)    Parameters below as of 19 Apr 2024 18:00  Patient On (Oxygen Delivery Method): nasal cannula, high flow  O2 Flow (L/min): 40  O2 Concentration (%): 40    PHYSICAL EXAM:    GENERAL: NAD, multiple drains noted  EYES:  conjunctiva and sclera clear  NECK: Supple, No JVD/Bruit  NERVOUS SYSTEM:  A/O x3,   CHEST:  No rales or rhonchi, Incision covered  HEART:  RRR, gr 2 murmur  ABDOMEN: Soft, NT/ND BS+  EXTREMITIES:  mild Edema;  SKIN: No rashes    LABS:                          12.2   14.91 )-----------( 159      ( 19 Apr 2024 02:00 )             35.7     04-19    141  |  104  |  33.3<H>  ----------------------------<  118<H>  4.9   |  24.0  |  1.65<H>    Ca    8.4      19 Apr 2024 17:14  Phos  3.6     04-18  Mg     2.3     04-19    TPro  4.7<L>  /  Alb  3.2<L>  /  TBili  1.6  /  DBili  x   /  AST  66<H>  /  ALT  25  /  AlkPhos  46  04-19      MEDICATIONS  (STANDING):  acetaminophen     Tablet ..  albuterol/ipratropium for Nebulization  ascorbic acid  aspirin enteric coated  cefuroxime  IVPB  chlorhexidine 2% Cloths  dextrose 50% Injectable  DOBUTamine Infusion  gabapentin  heparin   Injectable  insulin lispro Injectable (ADMELOG)  insulin regular Infusion  norepinephrine Infusion  oxyCODONE    IR PRN  oxyCODONE    IR PRN  pantoprazole    Tablet  polyethylene glycol 3350  senna  sodium chloride 0.9%.  sodium chloride 0.9%.  vancomycin  IVPB                                                                                                                                                                                                                                                                                                                                                                                                                                                                                                                                                                                                                                                                                                                                                                                                                                                                                                                                                                                                                                                                                                                                
Patient seen and examined    I&O's Summary    21 Apr 2024 07:01  -  22 Apr 2024 07:00  --------------------------------------------------------  IN: 734 mL / OUT: 3960 mL / NET: -3226 mL    22 Apr 2024 07:01  -  22 Apr 2024 19:23  --------------------------------------------------------  IN: 775 mL / OUT: 775 mL / NET: 0 mL    REVIEW OF SYSTEMS: feels tired today; Ate well    CONSTITUTIONAL: No F/C  RESPIRATORY: No cough,  No SOB  CARDIOVASCULAR: No CP/palpitations,    GASTROINTESTINAL: No abdominal pain  or NVD   GENITOURINARY: No UTI sx  NEUROLOGICAL: No headaches, NO wk/numbness  MUSCULOSKELETAL:   EXTREMITIES : no swelling,    Vital Signs Last 24 Hrs  T(C): 36.4 (22 Apr 2024 15:57), Max: 36.7 (22 Apr 2024 00:00)  T(F): 97.5 (22 Apr 2024 15:57), Max: 98.1 (22 Apr 2024 00:00)  HR: 62 (22 Apr 2024 19:00) (58 - 80)  BP: 150/68 (22 Apr 2024 19:00) (103/59 - 150/68)  BP(mean): 89 (22 Apr 2024 19:00) (73 - 97)  RR: 16 (22 Apr 2024 19:00) (12 - 27)  SpO2: 96% (22 Apr 2024 19:00) (94% - 100%)    Parameters below as of 22 Apr 2024 16:00  Patient On (Oxygen Delivery Method): nasal cannula  O2 Flow (L/min): 4      PHYSICAL EXAM:    GENERAL: NAD,   EYES:  conjunctiva and sclera clear  NECK: Supple, No JVD/Bruit  NERVOUS SYSTEM:  A/O x3,   CHEST:  No rales or rhonchi  HEART:  gr 2 murmur  ABDOMEN: Soft, NT/ND BS+  EXTREMITIES:  No Edema;  SKIN: No rashes    LABS:                          11.8   15.79 )-----------( 89       ( 22 Apr 2024 02:40 )             34.1     04-22    136  |  100  |  54.1<H>  ----------------------------<  125<H>  4.2   |  26.0  |  1.58<H>    Ca    8.0<L>      22 Apr 2024 02:40  Mg     2.4     04-22        MEDICATIONS  (STANDING):  acetaminophen     Tablet .. PRN  albuterol/ipratropium for Nebulization PRN  albuterol/ipratropium for Nebulization  ascorbic acid  aspirin enteric coated  bisacodyl Suppository  budesonide  80 MICROgram(s)/formoterol 4.5 MICROgram(s) Inhaler  chlorhexidine 2% Cloths  clopidogrel Tablet  gabapentin  insulin lispro (ADMELOG) corrective regimen sliding scale  melatonin  metoprolol tartrate  oxyCODONE    IR PRN  oxyCODONE    IR PRN  pantoprazole    Tablet  polyethylene glycol 3350  senna  sodium chloride 0.9%.  sodium chloride 0.9%.      
PULMONARY PROGRESS NOTE      ART BLACK  MRN-[id number]    Patient is a 84y old  Male who presents with a chief complaint of Atherosclerosis of native coronary artery without angina pectoris     (19 Apr 2024 12:45)        INTERVAL HPI/OVERNIGHT EVENTS:  Pt seen and examined at the bedside. No worsening cough, no wheezing.    MEDICATIONS  (STANDING):  acetaminophen     Tablet .. 975 milliGRAM(s) Oral every 6 hours  albuterol/ipratropium for Nebulization 3 milliLiter(s) Nebulizer every 6 hours  ascorbic acid 500 milliGRAM(s) Oral two times a day  aspirin enteric coated 325 milliGRAM(s) Oral daily  cefuroxime  IVPB 1500 milliGRAM(s) IV Intermittent every 8 hours  chlorhexidine 2% Cloths 1 Application(s) Topical two times a day  dextrose 50% Injectable 50 milliLiter(s) IV Push every 15 minutes  DOBUTamine Infusion 5 MICROgram(s)/kG/Min (7.8 mL/Hr) IV Continuous <Continuous>  gabapentin 100 milliGRAM(s) Oral every 8 hours  heparin   Injectable 5000 Unit(s) SubCutaneous every 8 hours  insulin lispro Injectable (ADMELOG) 2 Unit(s) SubCutaneous three times a day before meals  insulin regular Infusion 2 Unit(s)/Hr (2 mL/Hr) IV Continuous <Continuous>  norepinephrine Infusion 0.05 MICROgram(s)/kG/Min (4.88 mL/Hr) IV Continuous <Continuous>  pantoprazole    Tablet 40 milliGRAM(s) Oral daily  polyethylene glycol 3350 17 Gram(s) Oral daily  senna 2 Tablet(s) Oral at bedtime  sodium chloride 0.9%. 1000 milliLiter(s) (5 mL/Hr) IV Continuous <Continuous>  sodium chloride 0.9%. 1000 milliLiter(s) (10 mL/Hr) IV Continuous <Continuous>  vancomycin  IVPB 750 milliGRAM(s) IV Intermittent every 24 hours    MEDICATIONS  (PRN):  oxyCODONE    IR 10 milliGRAM(s) Oral every 4 hours PRN Severe Pain (7 - 10)  oxyCODONE    IR 5 milliGRAM(s) Oral every 4 hours PRN Moderate Pain (4 - 6)    Allergies    Levaquin (Unknown)  ampicillin (Unknown)  penicillin (Unknown)    Intolerances    Lipitor (Unknown)    PAST MEDICAL & SURGICAL HISTORY:  CAD (coronary artery disease)      HLD (hyperlipidemia)      H/O secondary hypertension      Asthma      History of COPD      Former smoker      Statin intolerance      Stage 3 chronic kidney disease      PNA (pneumonia)      Empyema            REVIEW OF SYSTEMS:  Negative except as noted in HPI      Vital Signs Last 24 Hrs  T(C): 38.2 (19 Apr 2024 18:00), Max: 38.2 (19 Apr 2024 18:00)  T(F): 100.8 (19 Apr 2024 18:00), Max: 100.8 (19 Apr 2024 18:00)  HR: 106 (19 Apr 2024 18:00) (58 - 106)  BP: 131/79 (19 Apr 2024 18:00) (77/51 - 149/64)  BP(mean): 94 (19 Apr 2024 18:00) (60 - 104)  RR: 23 (19 Apr 2024 18:00) (10 - 32)  SpO2: 91% (19 Apr 2024 18:00) (88% - 100%)    Parameters below as of 19 Apr 2024 18:00  Patient On (Oxygen Delivery Method): nasal cannula, high flow  O2 Flow (L/min): 40  O2 Concentration (%): 40      PHYSICAL EXAMINATION:  GEN: In no apparent distress  HEENT: NC/AT  NECK: Supple  CV: +S1, S2  RESPIRATORY: CTA anteriorly B/L, no wheezing  ABDOMEN: Soft, non-tender  PSYCH: Normal affect      LABS:                        12.2   14.91 )-----------( 159      ( 19 Apr 2024 02:00 )             35.7     04-19    141  |  104  |  33.3<H>  ----------------------------<  118<H>  4.9   |  24.0  |  1.65<H>    Ca    8.4      19 Apr 2024 17:14  Phos  3.6     04-18  Mg     2.3     04-19    TPro  4.7<L>  /  Alb  3.2<L>  /  TBili  1.6  /  DBili  x   /  AST  66<H>  /  ALT  25  /  AlkPhos  46  04-19    PT/INR - ( 19 Apr 2024 02:00 )   PT: 13.1 sec;   INR: 1.19 ratio         PTT - ( 19 Apr 2024 02:00 )  PTT:33.4 sec  Urinalysis Basic - ( 19 Apr 2024 17:14 )    Color: x / Appearance: x / SG: x / pH: x  Gluc: 118 mg/dL / Ketone: x  / Bili: x / Urobili: x   Blood: x / Protein: x / Nitrite: x   Leuk Esterase: x / RBC: x / WBC x   Sq Epi: x / Non Sq Epi: x / Bacteria: x      ABG - ( 19 Apr 2024 10:31 )  pH, Arterial: 7.440 pH, Blood: x     /  pCO2: 38    /  pO2: 58    / HCO3: 26    / Base Excess: 1.6   /  SaO2: 93.6              CARDIAC MARKERS ( 19 Apr 2024 02:00 )  x     / x     / 475 U/L / x     / 45.8 ng/mL  CARDIAC MARKERS ( 18 Apr 2024 19:21 )  x     / x     / 392 U/L / x     / 51.8 ng/mL                MICROBIOLOGY:      RADIOLOGY & ADDITIONAL STUDIES:  < from: Xray Chest 1 View- PORTABLE-Routine (04.19.24 @ 04:52) >    ACC: [id number] EXAM:  XR CHEST PORTABLE ROUTINE 1V   ORDERED BY: NANDO CARRANZA     ACC: [id number] EXAM:  XR CHEST PORTABLE ROUTINE 1V   ORDERED BY: MEHUL MUELLER     PROCEDURE DATE:  04/18/2024          INTERPRETATION:  Chest one view 4/18/2024 6:07 PM    HISTORY: Postop    COMPARISON STUDY: 9/17/2021 and 4/17/2024    Frontal expiratory view of the chest shows the heart to be similar in   size. Endotracheal tube, right jugular catheter, nasogastric tube,   mediastinal drain, sternal wires, aortic valve ring, left chest tube and   mid left lung infiltrate are present.    The lungs show clear right lung and there is no evidence of pneumothorax   nor pleural effusion.    Chest one view 4/19/2024 4:00 AM  Compared to the prior study, there is progression of left midlung   infiltrate.    IMPRESSION:  Left infiltrate.        Thank you for the courtesy of this referral.    --- End of Report ---            EFRA SHEA MD; Attending Interventional Radiologist  This document has been electronically signed. Apr 19 2024  2:32PM    < end of copied text >      ECHO:  < from: TTE W or WO Ultrasound Enhancing Agent (04.17.24 @ 19:28) >  _______________________________________________________________________________________     CONCLUSIONS:      1. Left ventricular cavity is normal in size. Left ventricular systolic function is normal with an ejection fraction of 62 % by Pastor's method of disks.   2. The left ventricular diastolic function is indeterminate.   3. Normal right ventricular cavity size and normal systolic function.   4. Moderate aortic regurgitation.   5. Technically difficult image quality.   6. The inferior vena cava is normal in size measuring 1.60 cm in diameter, (normal <2.1cm) with abnormal inspiratory collapse (abnormal <50%) consistent with mildly elevated right atrial pressure (~8, range 5-10mmHg).   7. Trileaflet aortic valve with reduced systolic excursion. There is severe calcification of the aortic valve leaflets. Severe aortic stenosis.    ________________________________________________________________________________________    < end of copied text >  
84yMale seen and evaluated bedside. Endorsing no acute complaints. Denies any chest pain, palpitations, shortness of breath, wheezing, abd pain, nausea, vomiting, constipation, lightheadedness, headaches, fevers, or chills.       PAST MEDICAL & SURGICAL HISTORY:  CAD (coronary artery disease)      HLD (hyperlipidemia)      H/O secondary hypertension      Asthma      History of COPD      Former smoker      Statin intolerance      Stage 3 chronic kidney disease      PNA (pneumonia)      Empyema          Medications:  acetaminophen     Tablet .. 650 milliGRAM(s) Oral every 6 hours PRN  albuterol/ipratropium for Nebulization 3 milliLiter(s) Nebulizer every 6 hours  apixaban 2.5 milliGRAM(s) Oral every 12 hours  aspirin enteric coated 81 milliGRAM(s) Oral daily  bisacodyl Suppository 10 milliGRAM(s) Rectal once  budesonide 160 MICROgram(s)/formoterol 4.5 MICROgram(s) Inhaler 2 Puff(s) Inhalation two times a day  furosemide    Tablet 40 milliGRAM(s) Oral daily  melatonin 5 milliGRAM(s) Oral at bedtime  metoprolol tartrate 25 milliGRAM(s) Oral two times a day  pantoprazole    Tablet 40 milliGRAM(s) Oral daily  polyethylene glycol 3350 17 Gram(s) Oral daily  senna 2 Tablet(s) Oral at bedtime      MEDICATIONS  (PRN):  acetaminophen     Tablet .. 650 milliGRAM(s) Oral every 6 hours PRN Mild Pain (1 - 3)      Daily Review:                                10.6   10.57 )-----------( 212      ( 27 Apr 2024 04:24 )             31.8   04-27    135  |  99  |  46.1<H>  ----------------------------<  101<H>  4.0   |  26.0  |  1.43<H>    Ca    7.6<L>      27 Apr 2024 04:24  Mg     2.4     04-27            T(C): 36.3 (04-27-24 @ 23:00), Max: 36.6 (04-27-24 @ 04:00)  HR: 54 (04-28-24 @ 00:00) (53 - 88)  BP: 107/64 (04-28-24 @ 00:00) (98/59 - 154/75)  RR: 18 (04-28-24 @ 00:00) (13 - 32)  SpO2: 91% (04-28-24 @ 00:00) (91% - 100%)  Wt(kg): --    CAPILLARY BLOOD GLUCOSE          I&O's Summary    26 Apr 2024 07:01  -  27 Apr 2024 07:00  --------------------------------------------------------  IN: 250 mL / OUT: 1150 mL / NET: -900 mL    27 Apr 2024 07:01  -  28 Apr 2024 00:21  --------------------------------------------------------  IN: 1020 mL / OUT: 820 mL / NET: 200 mL        Physical Exam  General: Well appearing, laying in bed on an incline, NAD  Neurological: AOx3, no focal neurological deficits  Cardiovascular: Regular Rate/Rhythm, S1/2 auscultated, No extra heart sounds  Respiratory: CTA b/l over all lung fields, No adventitious breath sounds  Gastrointestinal: Bowel sounds present in all 4 quadrants, Abdomen is soft, non-tender, non-distended  Extremities: +1 peripheral edema noted, 5/5 strength and full range of motion in all 4 extremities b/l  Vascular: 2+ peripheral pulses b/l in upper and lower extremities, Radial, DP  Incision Sites: MSI, CT sites, Vein [place] site, c/d/i, ostomy bag remains over L CT site, no erythema, no purulence. No Sternal Click               
84y Male seen and evaluated bedside. Endorsing no acute complaints. Denies any chest pain, palpitations, shortness of breath, wheezing, abd pain, nausea, vomiting, constipation, lightheadedness, headaches, fevers, or chills.     PAST MEDICAL & SURGICAL HISTORY:  CAD (coronary artery disease)      HLD (hyperlipidemia)      H/O secondary hypertension      Asthma      History of COPD      Former smoker      Statin intolerance      Stage 3 chronic kidney disease      PNA (pneumonia)      Empyema          Medications:  acetaminophen     Tablet .. 650 milliGRAM(s) Oral every 6 hours PRN  albuterol/ipratropium for Nebulization 3 milliLiter(s) Nebulizer every 6 hours PRN  albuterol/ipratropium for Nebulization 3 milliLiter(s) Nebulizer every 6 hours  aspirin enteric coated 81 milliGRAM(s) Oral daily  bisacodyl Suppository 10 milliGRAM(s) Rectal once  budesonide 160 MICROgram(s)/formoterol 4.5 MICROgram(s) Inhaler 2 Puff(s) Inhalation two times a day  clopidogrel Tablet 75 milliGRAM(s) Oral daily  furosemide    Tablet 40 milliGRAM(s) Oral daily  heparin   Injectable 5000 Unit(s) SubCutaneous every 8 hours  melatonin 5 milliGRAM(s) Oral at bedtime  metoprolol tartrate 25 milliGRAM(s) Oral two times a day  oxyCODONE    IR 10 milliGRAM(s) Oral every 4 hours PRN  oxyCODONE    IR 5 milliGRAM(s) Oral every 4 hours PRN  pantoprazole    Tablet 40 milliGRAM(s) Oral daily  polyethylene glycol 3350 17 Gram(s) Oral daily  senna 2 Tablet(s) Oral at bedtime      MEDICATIONS  (PRN):  acetaminophen     Tablet .. 650 milliGRAM(s) Oral every 6 hours PRN Mild Pain (1 - 3)  albuterol/ipratropium for Nebulization 3 milliLiter(s) Nebulizer every 6 hours PRN Shortness of Breath  oxyCODONE    IR 10 milliGRAM(s) Oral every 4 hours PRN Severe Pain (7 - 10)  oxyCODONE    IR 5 milliGRAM(s) Oral every 4 hours PRN Moderate Pain (4 - 6)      Daily Review:                                12.2   17.32 )-----------( 141      ( 25 Apr 2024 02:23 )             36.6   04-25    135  |  99  |  42.3<H>  ----------------------------<  111<H>  4.4   |  23.0  |  1.11    Ca    7.7<L>      25 Apr 2024 02:23  Mg     2.3     04-25            T(C): 36.7 (04-26-24 @ 00:00), Max: 36.7 (04-25-24 @ 16:05)  HR: 67 (04-26-24 @ 00:00) (67 - 92)  BP: 120/76 (04-26-24 @ 00:00) (101/67 - 144/85)  RR: 13 (04-26-24 @ 00:00) (13 - 31)  SpO2: 98% (04-26-24 @ 00:00) (93% - 100%)  Wt(kg): --    CAPILLARY BLOOD GLUCOSE          I&O's Summary    24 Apr 2024 07:01  -  25 Apr 2024 07:00  --------------------------------------------------------  IN: 1140 mL / OUT: 1350 mL / NET: -210 mL    25 Apr 2024 07:01  -  26 Apr 2024 00:31  --------------------------------------------------------  IN: 750 mL / OUT: 825 mL / NET: -75 mL        Physical Exam  General: Well appearing, laying in bed on an incline, NAD  Neurological: AOx3, no focal neurological deficits  Cardiovascular: Regular Rate/Rhythm, S1/2 auscultated, No extra heart sounds  Respiratory: CTA b/l over all lung fields, No adventitious breath sounds  Gastrointestinal: Bowel sounds present in all 4 quadrants, Abdomen is soft, non-tender, non-distended  Extremities: +1 peripheral edema noted, 5/5 strength and full range of motion in all 4 extremities b/l  Vascular: 2+ peripheral pulses b/l in upper and lower extremities, Radial, DP  Incision Sites: MSI, CT sites, Vein [place] site, c/d/i, no erythema, no purulence. No Sternal Click               
84yMale seen and evaluated bedside. Endorsing no acute complaints. Denies any chest pain, palpitations, shortness of breath, wheezing, abd pain, nausea, vomiting, constipation, lightheadedness, headaches, fevers, or chills.       PAST MEDICAL & SURGICAL HISTORY:  CAD (coronary artery disease)      HLD (hyperlipidemia)      H/O secondary hypertension      Asthma      History of COPD      Former smoker      Statin intolerance      Stage 3 chronic kidney disease      PNA (pneumonia)      Empyema          Medications:  acetaminophen     Tablet .. 650 milliGRAM(s) Oral every 6 hours PRN  albuterol/ipratropium for Nebulization 3 milliLiter(s) Nebulizer every 6 hours  apixaban 2.5 milliGRAM(s) Oral every 12 hours  aspirin enteric coated 81 milliGRAM(s) Oral daily  bisacodyl Suppository 10 milliGRAM(s) Rectal once  budesonide 160 MICROgram(s)/formoterol 4.5 MICROgram(s) Inhaler 2 Puff(s) Inhalation two times a day  furosemide    Tablet 40 milliGRAM(s) Oral daily  melatonin 5 milliGRAM(s) Oral at bedtime  metoprolol tartrate 25 milliGRAM(s) Oral two times a day  oxyCODONE    IR 10 milliGRAM(s) Oral every 4 hours PRN  oxyCODONE    IR 5 milliGRAM(s) Oral every 4 hours PRN  pantoprazole    Tablet 40 milliGRAM(s) Oral daily  polyethylene glycol 3350 17 Gram(s) Oral daily  senna 2 Tablet(s) Oral at bedtime      MEDICATIONS  (PRN):  acetaminophen     Tablet .. 650 milliGRAM(s) Oral every 6 hours PRN Mild Pain (1 - 3)  oxyCODONE    IR 10 milliGRAM(s) Oral every 4 hours PRN Severe Pain (7 - 10)  oxyCODONE    IR 5 milliGRAM(s) Oral every 4 hours PRN Moderate Pain (4 - 6)      Daily Review:                                11.9   12.26 )-----------( 176      ( 26 Apr 2024 04:04 )             35.7   04-26    138  |  101  |  42.6<H>  ----------------------------<  100<H>  4.2   |  23.0  |  1.25    Ca    7.7<L>      26 Apr 2024 04:04  Mg     2.2     04-26            T(C): 36.8 (04-27-24 @ 00:00), Max: 37 (04-26-24 @ 08:00)  HR: 71 (04-27-24 @ 00:00) (67 - 91)  BP: 137/76 (04-27-24 @ 00:00) (101/60 - 153/68)  RR: 26 (04-27-24 @ 00:00) (13 - 38)  SpO2: 100% (04-27-24 @ 00:00) (80% - 100%)  Wt(kg): --    CAPILLARY BLOOD GLUCOSE          I&O's Summary    25 Apr 2024 07:01  -  26 Apr 2024 07:00  --------------------------------------------------------  IN: 750 mL / OUT: 1200 mL / NET: -450 mL    26 Apr 2024 07:01  -  27 Apr 2024 00:33  --------------------------------------------------------  IN: 50 mL / OUT: 775 mL / NET: -725 mL        Physical Exam  General: Well appearing, laying in bed on an incline, NAD  Neurological: AOx3, no focal neurological deficits  Cardiovascular: Regular Rate/Rhythm, S1/2 auscultated, No extra heart sounds  Respiratory: CTA b/l over all lung fields, No adventitious breath sounds  Gastrointestinal: Bowel sounds present in all 4 quadrants, Abdomen is soft, non-tender, non-distended  Extremities: +1 peripheral edema noted, 5/5 strength and full range of motion in all 4 extremities b/l  Vascular: 2+ peripheral pulses b/l in upper and lower extremities, Radial, DP  Incision Sites: MSI, CT sites, Vein [place] site, c/d/i, ostomy bag remains over L CT site, no erythema, no purulence. No Sternal Click               
Patient seen and examined.  Denies CP, SOB, N/V.    T(C): 36.7 (04-22-24 @ 00:00)  T(F): 98.1 (04-22-24 @ 00:00)  HR: 58 (04-22-24 @ 01:00)  BP: 121/74 (04-22-24 @ 01:00)  BP(mean): 88 (04-22-24 @ 01:00)    RR: 12 (04-22-24 @ 01:00)  SpO2: 95% (04-22-24 @ 01:00)      Physical Exam:  Gen: A&Ox3  Pulm:  CTA b/l, no r/r/w  CV:  S1S2, RRR, no m/r/g  Abd: +BS, soft, NT, ND  Ext:  +DP b/l, no c/c/e legs wrapped   Incision:  c/d/i no click, no exudate    I&O's Detail    20 Apr 2024 07:01  -  21 Apr 2024 07:00  --------------------------------------------------------  IN:    DOBUTamine: 93.6 mL    DOBUTamine: 48 mL    IV PiggyBack: 250 mL    Oral Fluid: 600 mL    sodium chloride 0.9%: 115 mL  Total IN: 1106.6 mL    OUT:    Chest Tube (mL): 130 mL    Chest Tube (mL): 110 mL    Indwelling Catheter - Urethral (mL): 3385 mL  Total OUT: 3625 mL    Total NET: -2518.4 mL      21 Apr 2024 07:01  -  22 Apr 2024 01:58  --------------------------------------------------------  IN:    DOBUTamine: 14 mL    Oral Fluid: 600 mL  Total IN: 614 mL    OUT:    Chest Tube (mL): 10 mL    Chest Tube (mL): 120 mL    Indwelling Catheter - Urethral (mL): 3250 mL  Total OUT: 3380 mL    Total NET: -2766 mL                              11.0   14.08 )-----------( 83       ( 21 Apr 2024 02:15 )             31.9   04-21    134<L>  |  96  |  57.3<H>  ----------------------------<  126<H>  4.6   |  19.0<L>  |  1.88<H>    Ca    7.9<L>      21 Apr 2024 20:35  Mg     2.3     04-21        CAPILLARY BLOOD GLUCOSE      POCT Blood Glucose.: 166 mg/dL (21 Apr 2024 22:25)        Medications:  acetaminophen     Tablet .. 650 milliGRAM(s) Oral every 6 hours PRN  albuterol/ipratropium for Nebulization 3 milliLiter(s) Nebulizer every 6 hours  albuterol/ipratropium for Nebulization 3 milliLiter(s) Nebulizer every 6 hours PRN  ascorbic acid 500 milliGRAM(s) Oral two times a day  aspirin enteric coated 325 milliGRAM(s) Oral daily  bisacodyl Suppository 10 milliGRAM(s) Rectal once  budesonide  80 MICROgram(s)/formoterol 4.5 MICROgram(s) Inhaler 2 Puff(s) Inhalation two times a day  chlorhexidine 2% Cloths 1 Application(s) Topical two times a day  clopidogrel Tablet 75 milliGRAM(s) Oral daily  gabapentin 100 milliGRAM(s) Oral every 8 hours  insulin lispro (ADMELOG) corrective regimen sliding scale   SubCutaneous Before meals and at bedtime  melatonin 5 milliGRAM(s) Oral at bedtime  metoprolol tartrate 12.5 milliGRAM(s) Oral two times a day  oxyCODONE    IR 10 milliGRAM(s) Oral every 4 hours PRN  oxyCODONE    IR 5 milliGRAM(s) Oral every 4 hours PRN  pantoprazole    Tablet 40 milliGRAM(s) Oral daily  polyethylene glycol 3350 17 Gram(s) Oral daily  senna 2 Tablet(s) Oral at bedtime  sodium chloride 0.9%. 1000 milliLiter(s) IV Continuous <Continuous>  sodium chloride 0.9%. 1000 milliLiter(s) IV Continuous <Continuous>        
Brief History:  83 y/o male with CAD, HLD, HTN, asthma/COPD, former tobacco use, statin intolerance, baseline CKD3 (Baseline Cr 1.4), history of PNA/empyema, pericarditis, and adrenal tumor (?pheochromocytoma; cleared by nephrology prior to OR) who underwent bioprosthetic AVR and CABGx3 on 4/18 by Dr. Cho. Post operative course with bleeding requiring 4x PRBC, 2x FFP, 500 FEIBA in immediate post op period; post operative respiratory insufficiency requiring HFNC (now weaned off), and LISA on CKD which is improving.     Interval History:  Patient seen and examined at bedside. Reports feeling better, denies chest pain, dyspnea, N/V/D, abdominal pain, headache.   CT chest done yesterday for persistent O2 requirement; revealed anterior left lung residual hematoma which is auto-evacuating via chest tube site as patient mobilizes, small bilat pleaual effusions    T(C): 36.7 (04-22-24 @ 00:00)  T(F): 98.1 (04-22-24 @ 00:00)  HR: 58 (04-22-24 @ 01:00)  BP: 121/74 (04-22-24 @ 01:00)  BP(mean): 88 (04-22-24 @ 01:00)    RR: 12 (04-22-24 @ 01:00)  SpO2: 95% (04-22-24 @ 01:00)      Physical Exam:  Gen: A&Ox3  Pulm:  CTA b/l, no r/r/w  CV:  S1S2, RRR, no m/r/g  Abd: +BS, soft, NT, ND  Ext:  +DP b/l, no c/c/e legs wrapped, 2+ edema BLE  Incision:  c/d/i no click, no exudate    I&O's Detail    20 Apr 2024 07:01  -  21 Apr 2024 07:00  --------------------------------------------------------  IN:    DOBUTamine: 93.6 mL    DOBUTamine: 48 mL    IV PiggyBack: 250 mL    Oral Fluid: 600 mL    sodium chloride 0.9%: 115 mL  Total IN: 1106.6 mL    OUT:    Chest Tube (mL): 130 mL    Chest Tube (mL): 110 mL    Indwelling Catheter - Urethral (mL): 3385 mL  Total OUT: 3625 mL    Total NET: -2518.4 mL      21 Apr 2024 07:01  -  22 Apr 2024 01:58  --------------------------------------------------------  IN:    DOBUTamine: 14 mL    Oral Fluid: 600 mL  Total IN: 614 mL    OUT:    Chest Tube (mL): 10 mL    Chest Tube (mL): 120 mL    Indwelling Catheter - Urethral (mL): 3250 mL  Total OUT: 3380 mL    Total NET: -2766 mL                              11.0   14.08 )-----------( 83       ( 21 Apr 2024 02:15 )             31.9   04-21    134<L>  |  96  |  57.3<H>  ----------------------------<  126<H>  4.6   |  19.0<L>  |  1.88<H>    Ca    7.9<L>      21 Apr 2024 20:35  Mg     2.3     04-21        CAPILLARY BLOOD GLUCOSE      POCT Blood Glucose.: 166 mg/dL (21 Apr 2024 22:25)        Medications:  acetaminophen     Tablet .. 650 milliGRAM(s) Oral every 6 hours PRN  albuterol/ipratropium for Nebulization 3 milliLiter(s) Nebulizer every 6 hours  albuterol/ipratropium for Nebulization 3 milliLiter(s) Nebulizer every 6 hours PRN  ascorbic acid 500 milliGRAM(s) Oral two times a day  aspirin enteric coated 325 milliGRAM(s) Oral daily  bisacodyl Suppository 10 milliGRAM(s) Rectal once  budesonide  80 MICROgram(s)/formoterol 4.5 MICROgram(s) Inhaler 2 Puff(s) Inhalation two times a day  chlorhexidine 2% Cloths 1 Application(s) Topical two times a day  clopidogrel Tablet 75 milliGRAM(s) Oral daily  gabapentin 100 milliGRAM(s) Oral every 8 hours  insulin lispro (ADMELOG) corrective regimen sliding scale   SubCutaneous Before meals and at bedtime  melatonin 5 milliGRAM(s) Oral at bedtime  metoprolol tartrate 12.5 milliGRAM(s) Oral two times a day  oxyCODONE    IR 10 milliGRAM(s) Oral every 4 hours PRN  oxyCODONE    IR 5 milliGRAM(s) Oral every 4 hours PRN  pantoprazole    Tablet 40 milliGRAM(s) Oral daily  polyethylene glycol 3350 17 Gram(s) Oral daily  senna 2 Tablet(s) Oral at bedtime  sodium chloride 0.9%. 1000 milliLiter(s) IV Continuous <Continuous>  sodium chloride 0.9%. 1000 milliLiter(s) IV Continuous <Continuous>        
Brief History:  85 y/o male with CAD, HLD, HTN, asthma/COPD, former tobacco use, statin intolerance, baseline CKD3 (Baseline Cr 1.4), history of PNA/empyema, pericarditis, and adrenal tumor (?pheochromocytoma; cleared by nephrology prior to OR) who underwent bioprosthetic AVR and CABGx3 on 4/18 by Dr. Cho. Post operative course with bleeding requiring 4x PRBC, 2x FFP, 500 FEIBA in immediate post op period; post operative respiratory insufficiency requiring HFNC (now weaned off), and LISA on CKD which is improving.     Interval History:  Cr continues to downtrend; diuretic held yesterday during day  Weaned off dobutamine during the day yesterday  Patient seen and examined at bedside. Reports feeling better, denies chest pain, dyspnea, N/V/D, abdominal pain, headache.     T(C): 36.7 (04-22-24 @ 00:00)  T(F): 98.1 (04-22-24 @ 00:00)  HR: 58 (04-22-24 @ 01:00)  BP: 121/74 (04-22-24 @ 01:00)  BP(mean): 88 (04-22-24 @ 01:00)    RR: 12 (04-22-24 @ 01:00)  SpO2: 95% (04-22-24 @ 01:00)      Physical Exam:  Gen: A&Ox3  Pulm:  CTA b/l, no r/r/w  CV:  S1S2, RRR, no m/r/g  Abd: +BS, soft, NT, ND  Ext:  +DP b/l, no c/c/e legs wrapped, 2+ edema BLE  Incision:  c/d/i no click, no exudate    I&O's Detail    20 Apr 2024 07:01  -  21 Apr 2024 07:00  --------------------------------------------------------  IN:    DOBUTamine: 93.6 mL    DOBUTamine: 48 mL    IV PiggyBack: 250 mL    Oral Fluid: 600 mL    sodium chloride 0.9%: 115 mL  Total IN: 1106.6 mL    OUT:    Chest Tube (mL): 130 mL    Chest Tube (mL): 110 mL    Indwelling Catheter - Urethral (mL): 3385 mL  Total OUT: 3625 mL    Total NET: -2518.4 mL      21 Apr 2024 07:01  -  22 Apr 2024 01:58  --------------------------------------------------------  IN:    DOBUTamine: 14 mL    Oral Fluid: 600 mL  Total IN: 614 mL    OUT:    Chest Tube (mL): 10 mL    Chest Tube (mL): 120 mL    Indwelling Catheter - Urethral (mL): 3250 mL  Total OUT: 3380 mL    Total NET: -2766 mL                              11.0   14.08 )-----------( 83       ( 21 Apr 2024 02:15 )             31.9   04-21    134<L>  |  96  |  57.3<H>  ----------------------------<  126<H>  4.6   |  19.0<L>  |  1.88<H>    Ca    7.9<L>      21 Apr 2024 20:35  Mg     2.3     04-21        CAPILLARY BLOOD GLUCOSE      POCT Blood Glucose.: 166 mg/dL (21 Apr 2024 22:25)        Medications:  acetaminophen     Tablet .. 650 milliGRAM(s) Oral every 6 hours PRN  albuterol/ipratropium for Nebulization 3 milliLiter(s) Nebulizer every 6 hours  albuterol/ipratropium for Nebulization 3 milliLiter(s) Nebulizer every 6 hours PRN  ascorbic acid 500 milliGRAM(s) Oral two times a day  aspirin enteric coated 325 milliGRAM(s) Oral daily  bisacodyl Suppository 10 milliGRAM(s) Rectal once  budesonide  80 MICROgram(s)/formoterol 4.5 MICROgram(s) Inhaler 2 Puff(s) Inhalation two times a day  chlorhexidine 2% Cloths 1 Application(s) Topical two times a day  clopidogrel Tablet 75 milliGRAM(s) Oral daily  gabapentin 100 milliGRAM(s) Oral every 8 hours  insulin lispro (ADMELOG) corrective regimen sliding scale   SubCutaneous Before meals and at bedtime  melatonin 5 milliGRAM(s) Oral at bedtime  metoprolol tartrate 12.5 milliGRAM(s) Oral two times a day  oxyCODONE    IR 10 milliGRAM(s) Oral every 4 hours PRN  oxyCODONE    IR 5 milliGRAM(s) Oral every 4 hours PRN  pantoprazole    Tablet 40 milliGRAM(s) Oral daily  polyethylene glycol 3350 17 Gram(s) Oral daily  senna 2 Tablet(s) Oral at bedtime  sodium chloride 0.9%. 1000 milliLiter(s) IV Continuous <Continuous>  sodium chloride 0.9%. 1000 milliLiter(s) IV Continuous <Continuous>        
Brief summary:  84yMale with PMHx CAD, HLD, HTN, asthma, COPD, former smoker (4.5 cigars/daily, ~ 50years, quit 2018), Statin intolerance, CKD3 (baseline sCr 1.4), PNA/Empyema, pericarditis and possible adrenal tumor/ pheochromocytoma. In 2019 STEMI and underwent cath which showed moderate TVD and normal EF - no intervention was diagnosed with pericarditis. Has close follow up with his cardiologist and nephrologist for new onset secondary HTN (reportedly SBP 200s). Imaging in 1/2022 noted 8mm left adrenal nodule (repeat 1/2023 showing stable appearance), pheochromocytoma workup indeterminate. MR abdomen cannot rule out neuroendocrine tumor. Was started on beta blocker and diuretics with improvement in blood pressure. Patient with progressive ROJO and intermittent lower extremity edema. Had CTA of coronaries to follow up on progression of his CAD which revealed total calcium score of 3065. Underwent LHC showing LM and severe TVD. Transfered to [facility] for CABG eval on 4/17. He was cleared by his nephrologist to proceed with CT surgery regarding the adrenal mass. He is now s/p CABGx3 on 4/19 by Dr. Cho. Intraop course notable for vasoplegia and slow Afib but no pacing requirements. 2 PRBC, 3 PLT, 10 of cryo given in OR. Arrived to CTICU on levo and epi gtts.    Overnight events:  Chest tube output elevated reqiuring additional post operative transfusion with 4x PRBC, 2x FFP, 500 FEIBA; now improved output with normalization of coags  Lactic acidosis; BP improving, attempting wean of epi as tolerated by HR and BP  Remains intubated while chest tube output stabilizing with plan to wean ventilator as tolerated    PAST MEDICAL & SURGICAL HISTORY:  CAD (coronary artery disease)  HLD (hyperlipidemia)  H/O secondary hypertension  Asthma  History of COPD  Former smoker  Statin intolerance  Stage 3 chronic kidney disease  PNA (pneumonia)  Empyema          Medications:  acetaminophen     Tablet .. 975 milliGRAM(s) Oral every 6 hours  albuterol/ipratropium for Nebulization 3 milliLiter(s) Nebulizer every 6 hours  aMIOdarone    Tablet 400 milliGRAM(s) Oral two times a day  ascorbic acid 500 milliGRAM(s) Oral two times a day  aspirin enteric coated 325 milliGRAM(s) Oral daily  chlorhexidine 0.12% Liquid 15 milliLiter(s) Oral Mucosa every 12 hours  chlorhexidine 0.12% Liquid 15 milliLiter(s) Oral Mucosa every 12 hours  chlorhexidine 0.12% Liquid 15 milliLiter(s) Swish and Spit once  chlorhexidine 2% Cloths 1 Application(s) Topical two times a day  dexAMETHasone  Injectable 4 milliGRAM(s) IV Push every 6 hours  dextrose 50% Injectable 50 milliLiter(s) IV Push every 15 minutes  EPINEPHrine    Infusion 0.04 MICROgram(s)/kG/Min IV Continuous <Continuous>  gabapentin 100 milliGRAM(s) Oral every 8 hours  insulin regular Infusion 2 Unit(s)/Hr IV Continuous <Continuous>  norepinephrine Infusion 0.05 MICROgram(s)/kG/Min IV Continuous <Continuous>  oxyCODONE    IR 10 milliGRAM(s) Oral every 4 hours PRN  oxyCODONE    IR 5 milliGRAM(s) Oral every 4 hours PRN  pantoprazole    Tablet 40 milliGRAM(s) Oral daily  polyethylene glycol 3350 17 Gram(s) Oral daily  potassium chloride  10 mEq/50 mL IVPB 10 milliEquivalent(s) IV Intermittent every 1 hour  potassium chloride  10 mEq/50 mL IVPB 10 milliEquivalent(s) IV Intermittent every 1 hour  senna 2 Tablet(s) Oral at bedtime  sodium chloride 0.9%. 1000 milliLiter(s) IV Continuous <Continuous>  sodium chloride 0.9%. 1000 milliLiter(s) IV Continuous <Continuous>  vancomycin  IVPB 750 milliGRAM(s) IV Intermittent once  vasopressin Infusion 0.04 Unit(s)/Min IV Continuous <Continuous>      MEDICATIONS  (PRN):  oxyCODONE    IR 10 milliGRAM(s) Oral every 4 hours PRN Severe Pain (7 - 10)  oxyCODONE    IR 5 milliGRAM(s) Oral every 4 hours PRN Moderate Pain (4 - 6)      Daily Review:    Height (cm): 162 (04-18 @ 12:09)  Weight (kg): 52 (04-18 @ 12:09)  BMI (kg/m2): 19.8 (04-18 @ 12:09)  BSA (m2): 1.54 (04-18 @ 12:09)Mode: AC/ CMV (Assist Control/ Continuous Mandatory Ventilation), RR (machine): 12, TV (machine): 550, FiO2: 60, PEEP: 6, MAP: 11, PIP: 32    ABG - ( 19 Apr 2024 00:15 )  pH, Arterial: 7.250 pH, Blood: x     /  pCO2: 41    /  pO2: 73    / HCO3: 18    / Base Excess: -9.2  /  SaO2: 96.9                                    10.2   13.61 )-----------( 172      ( 18 Apr 2024 23:50 )             30.6   04-18    146<H>  |  107  |  19.3  ----------------------------<  191<H>  4.7   |  23.0  |  1.00    Ca    8.1<L>      18 Apr 2024 19:21  Phos  3.6     04-18  Mg     2.6     04-18    TPro  4.0<L>  /  Alb  2.6<L>  /  TBili  0.8  /  DBili  x   /  AST  44<H>  /  ALT  9   /  AlkPhos  45  04-18    CARDIAC MARKERS ( 18 Apr 2024 19:21 )  x     / x     / 392 U/L / x     / 51.8 ng/mL    PT/INR - ( 18 Apr 2024 23:50 )   PT: 13.0 sec;   INR: 1.18 ratio         PTT - ( 18 Apr 2024 23:50 )  PTT:35.0 sec    T(C): 37.4 (04-19-24 @ 01:00), Max: 37.4 (04-19-24 @ 01:00)  HR: 68 (04-19-24 @ 01:00) (47 - 94)  BP: 129/66 (04-19-24 @ 01:00) (77/51 - 138/65)  RR: 12 (04-19-24 @ 01:00) (10 - 18)  SpO2: 98% (04-19-24 @ 01:00) (92% - 100%)  Wt(kg): --    CAPILLARY BLOOD GLUCOSE      POCT Blood Glucose.: 159 mg/dL (19 Apr 2024 01:02)  POCT Blood Glucose.: 187 mg/dL (19 Apr 2024 00:05)  POCT Blood Glucose.: 188 mg/dL (18 Apr 2024 23:19)  POCT Blood Glucose.: 204 mg/dL (18 Apr 2024 22:02)  POCT Blood Glucose.: 221 mg/dL (18 Apr 2024 21:12)  POCT Blood Glucose.: 255 mg/dL (18 Apr 2024 20:20)  POCT Blood Glucose.: 96 mg/dL (18 Apr 2024 12:07)  POCT Blood Glucose.: 84 mg/dL (18 Apr 2024 08:33)      I&O's Summary    17 Apr 2024 07:01  -  18 Apr 2024 07:00  --------------------------------------------------------  IN: 300 mL / OUT: 275 mL / NET: 25 mL    18 Apr 2024 07:01  -  19 Apr 2024 01:20  --------------------------------------------------------  IN: 3802.2 mL / OUT: 1605 mL / NET: 2197.2 mL        Physical Exam  Neuro: Intubated, sedated, unable to fully assess. Pupils equal, sluggishly reactive (on sedation)  Pulm: coarse breath sounds bilaterally, no wheezing or rales  CV: bradycardic, irregularly irregular, +S1S2  Abd: soft, NT, ND, hypoactive bowel sounds  Ext: +DP Pulses b/l, +PT pulses, 1+ edema BLE  Inc: Mediastinal sternal incision C/D/I/stable w/ dressing, left C/D/I with Ace wrap  Chest tubes: left  /  mediastinal chest tubes to suction no air leak              
84yMale with PMHx CAD, HLD, HTN, asthma, COPD, former smoker (4.5 cigars/daily, ~ 50years, quit 2018), Statin intolerance, CKD3 (baseline sCr 1.4), PNA/Empyema, pericarditis and possible adrenal tumor/ pheochromocytoma. In 2019 STEMI and underwent cath which showed moderate TVD and normal EF - no intervention was diagnosed with pericarditis. Has close follow up with his cardiologist and nephrologist for new onset secondary HTN (reportedly SBP 200s). Imaging in 2022 noted 8mm left adrenal nodule (repeat 2023 showing stable appearance), pheochromocytoma workup indeterminate. MR abdomen cannot rule out neuroendocrine tumor. Was started on beta blocker and diuretics with improvement in blood pressure. Patient with progressive ROJO and intermittent lower extremity edema. Had CTA of coronaries to follow up on progression of his CAD which revealed total calcium score of 3065. Underwent LHC showing LM and severe TVD. Transfered to [facility] for CABG eval on . He was cleared by his nephrologist to proceed with CT surgery regarding the adrenal mass. He is now s/p CABGx3 on  by Dr. Cho. Intraop course notable for vasoplegia and slow Afib but no pacing requirements. 2 PRBC, 3 PLT, 10 of cryo given in OR. Arrived to CTICU on levo and epi gtts.       AVR(T) C3L (LIMA-LAD, SVG-OM, SVG-PDA) now POD#2    On POD#0 Chest tube output elevated requiring additional post operative transfusion with 4x PRBC, 2x FFP, 500 FEIBA;     Interval events:  - Patient extubated, now requiring HFNC 40L/60% weaning   - Off levo,  @5mcg/min  - Transitioned off insulin gtt, now on moderate SSI   - Plavix started       PAST MEDICAL & SURGICAL HISTORY:  CAD (coronary artery disease)  HLD (hyperlipidemia)  H/O secondary hypertension  Asthma  History of COPD  Former smoker  Statin intolerance  Stage 3 chronic kidney disease  PNA (pneumonia)  Empyema        Medications:  acetaminophen     Tablet .. 975 milliGRAM(s) Oral every 6 hours  albuterol/ipratropium for Nebulization 3 milliLiter(s) Nebulizer every 6 hours  aMIOdarone    Tablet 400 milliGRAM(s) Oral two times a day  ascorbic acid 500 milliGRAM(s) Oral two times a day  aspirin enteric coated 325 milliGRAM(s) Oral daily  chlorhexidine 0.12% Liquid 15 milliLiter(s) Oral Mucosa every 12 hours  chlorhexidine 0.12% Liquid 15 milliLiter(s) Oral Mucosa every 12 hours  chlorhexidine 0.12% Liquid 15 milliLiter(s) Swish and Spit once  chlorhexidine 2% Cloths 1 Application(s) Topical two times a day  dexAMETHasone  Injectable 4 milliGRAM(s) IV Push every 6 hours  dextrose 50% Injectable 50 milliLiter(s) IV Push every 15 minutes  EPINEPHrine    Infusion 0.04 MICROgram(s)/kG/Min IV Continuous <Continuous>  gabapentin 100 milliGRAM(s) Oral every 8 hours  insulin regular Infusion 2 Unit(s)/Hr IV Continuous <Continuous>  norepinephrine Infusion 0.05 MICROgram(s)/kG/Min IV Continuous <Continuous>  oxyCODONE    IR 10 milliGRAM(s) Oral every 4 hours PRN  oxyCODONE    IR 5 milliGRAM(s) Oral every 4 hours PRN  pantoprazole    Tablet 40 milliGRAM(s) Oral daily  polyethylene glycol 3350 17 Gram(s) Oral daily  potassium chloride  10 mEq/50 mL IVPB 10 milliEquivalent(s) IV Intermittent every 1 hour  potassium chloride  10 mEq/50 mL IVPB 10 milliEquivalent(s) IV Intermittent every 1 hour  senna 2 Tablet(s) Oral at bedtime  sodium chloride 0.9%. 1000 milliLiter(s) IV Continuous <Continuous>  sodium chloride 0.9%. 1000 milliLiter(s) IV Continuous <Continuous>  vancomycin  IVPB 750 milliGRAM(s) IV Intermittent once  vasopressin Infusion 0.04 Unit(s)/Min IV Continuous <Continuous>      MEDICATIONS  (PRN):  oxyCODONE    IR 10 milliGRAM(s) Oral every 4 hours PRN Severe Pain (7 - 10)  oxyCODONE    IR 5 milliGRAM(s) Oral every 4 hours PRN Moderate Pain (4 - 6)      Daily Review:    Height (cm): 162 ( @ 12:09)  Weight (kg): 52 ( @ 12:09)  BMI (kg/m2): 19.8 ( @ 12:09)  BSA (m2): 1.54 ( @ 12:09)Mode: AC/ CMV (Assist Control/ Continuous Mandatory Ventilation), RR (machine): 12, TV (machine): 550, FiO2: 60, PEEP: 6, MAP: 11, PIP: 32    ABG - ( 2024 00:15 )  pH, Arterial: 7.250 pH, Blood: x     /  pCO2: 41    /  pO2: 73    / HCO3: 18    / Base Excess: -9.2  /  SaO2: 96.9                                    10.2   13.61 )-----------( 172      ( 2024 23:50 )             30.6   04-18    146<H>  |  107  |  19.3  ----------------------------<  191<H>  4.7   |  23.0  |  1.00    Ca    8.1<L>      2024 19:21  Phos  3.6     04-18  Mg     2.6     04-18    TPro  4.0<L>  /  Alb  2.6<L>  /  TBili  0.8  /  DBili  x   /  AST  44<H>  /  ALT  9   /  AlkPhos  45  04-18    CARDIAC MARKERS ( 2024 19:21 )  x     / x     / 392 U/L / x     / 51.8 ng/mL    PT/INR - ( 2024 23:50 )   PT: 13.0 sec;   INR: 1.18 ratio         PTT - ( 2024 23:50 )  PTT:35.0 sec    T(C): 37.4 (24 @ 01:00), Max: 37.4 (24 @ 01:00)  HR: 68 (24 @ 01:00) (47 - 94)  BP: 129/66 (24 @ 01:00) (77/51 - 138/65)  RR: 12 (24 @ 01:00) (10 - 18)  SpO2: 98% (24 @ 01:00) (92% - 100%)  Wt(kg): --    CAPILLARY BLOOD GLUCOSE      POCT Blood Glucose.: 159 mg/dL (2024 01:02)  POCT Blood Glucose.: 187 mg/dL (2024 00:05)  POCT Blood Glucose.: 188 mg/dL (2024 23:19)  POCT Blood Glucose.: 204 mg/dL (2024 22:02)  POCT Blood Glucose.: 221 mg/dL (2024 21:12)  POCT Blood Glucose.: 255 mg/dL (2024 20:20)  POCT Blood Glucose.: 96 mg/dL (2024 12:07)  POCT Blood Glucose.: 84 mg/dL (2024 08:33)      I&O's Summary    2024 07:01  -  2024 07:00  --------------------------------------------------------  IN: 300 mL / OUT: 275 mL / NET: 25 mL    2024 07:01  -  2024 01:20  --------------------------------------------------------  IN: 3802.2 mL / OUT: 1605 mL / NET: 2197.2 mL    REVIEW OF SYSTEMS :  CONSTITUTIONAL:  Generalized weakness, no fevers or chills  NEUROLOGICAL:  No numbness or weakness  EYES/ENT:  No visual changes;  No vertigo or throat pain   NECK:  No pain or stiffness  RESPIRATORY:  No cough, wheezing, hemoptysis; No shortness of breath  CARDIOVASCULAR:  Minimal chest pain (MSI), no  palpitations  GASTROINTESTINAL:  No abdominal or epigastric pain. No nausea, vomiting, or hematemesis; No diarrhea or constipation. No melena or hematochezia.  GENITOURINARY:  No dysuria, frequency or hematuria  MUSCULOSKELETAL:  FROM all extremities, normal strength, No calf tenderness  SKIN:  No itching, rashes    PHYSICAL EXAM:  GENERAL: lying in bed comfortably  NERVOUS SYSTEM:  A&Ox4, moving all extremities, no focal deficits   HEAD:  Atraumatic  EYES: PERRLA, conjunctiva and sclera clear  NECK: Supple, trachea midline, no JVD  HEART: Regular rate and rhythm, no murmurs, rubs, or gallops  LUNGS: Unlabored respirations.  Clear to auscultation bilaterally, no crackles, wheezing, or rhonchi  ABDOMEN: Soft, nontender, nondistended, +BS  EXTREMITIES: 2+ peripheral pulses bilaterally. No clubbing, cyanosis, or edema  Ext: +DP Pulses b/l, +PT pulses, 1+ edema BLE  Inc: Mediastinal sternal incision C/D/I/stable w/ dressing, left C/D/I with Ace wrap  Chest tubes: left  /  mediastinal chest tubes to suction no air leak              
Brief History:  85 y/o male with CAD, HLD, HTN, asthma/COPD, former tobacco use, statin intolerance, baseline CKD3 (Baseline Cr 1.4), history of PNA/empyema, pericarditis, and adrenal tumor (?pheochromocytoma; cleared by nephrology prior to OR) who underwent bioprosthetic AVR and CABGx3 on 4/18 by Dr. Cho. Post operative course with bleeding requiring 4x PRBC, 2x FFP, 500 FEIBA in immediate post op period; post operative respiratory insufficiency requiring HFNC (now weaned off), and LISA on CKD which is improving.     Interval History:  Patient seen and examined at bedside. Reports feeling better, denies chest pain, dyspnea, N/V/D, abdominal pain, headache.   +BM yesterday during day after lactulose and suppository    T(C): 36.7 (04-22-24 @ 00:00)  T(F): 98.1 (04-22-24 @ 00:00)  HR: 58 (04-22-24 @ 01:00)  BP: 121/74 (04-22-24 @ 01:00)  BP(mean): 88 (04-22-24 @ 01:00)    RR: 12 (04-22-24 @ 01:00)  SpO2: 95% (04-22-24 @ 01:00)      Physical Exam:  Gen: A&Ox3  Pulm:  CTA b/l, no r/r/w  CV:  S1S2, RRR, no m/r/g  Abd: +BS, soft, NT, ND  Ext:  +DP b/l, no c/c/e legs wrapped, 2+ edema BLE  Incision:  c/d/i no click, no exudate    I&O's Detail    20 Apr 2024 07:01  -  21 Apr 2024 07:00  --------------------------------------------------------  IN:    DOBUTamine: 93.6 mL    DOBUTamine: 48 mL    IV PiggyBack: 250 mL    Oral Fluid: 600 mL    sodium chloride 0.9%: 115 mL  Total IN: 1106.6 mL    OUT:    Chest Tube (mL): 130 mL    Chest Tube (mL): 110 mL    Indwelling Catheter - Urethral (mL): 3385 mL  Total OUT: 3625 mL    Total NET: -2518.4 mL      21 Apr 2024 07:01  -  22 Apr 2024 01:58  --------------------------------------------------------  IN:    DOBUTamine: 14 mL    Oral Fluid: 600 mL  Total IN: 614 mL    OUT:    Chest Tube (mL): 10 mL    Chest Tube (mL): 120 mL    Indwelling Catheter - Urethral (mL): 3250 mL  Total OUT: 3380 mL    Total NET: -2766 mL                              11.0   14.08 )-----------( 83       ( 21 Apr 2024 02:15 )             31.9   04-21    134<L>  |  96  |  57.3<H>  ----------------------------<  126<H>  4.6   |  19.0<L>  |  1.88<H>    Ca    7.9<L>      21 Apr 2024 20:35  Mg     2.3     04-21        CAPILLARY BLOOD GLUCOSE      POCT Blood Glucose.: 166 mg/dL (21 Apr 2024 22:25)        Medications:  acetaminophen     Tablet .. 650 milliGRAM(s) Oral every 6 hours PRN  albuterol/ipratropium for Nebulization 3 milliLiter(s) Nebulizer every 6 hours  albuterol/ipratropium for Nebulization 3 milliLiter(s) Nebulizer every 6 hours PRN  ascorbic acid 500 milliGRAM(s) Oral two times a day  aspirin enteric coated 325 milliGRAM(s) Oral daily  bisacodyl Suppository 10 milliGRAM(s) Rectal once  budesonide  80 MICROgram(s)/formoterol 4.5 MICROgram(s) Inhaler 2 Puff(s) Inhalation two times a day  chlorhexidine 2% Cloths 1 Application(s) Topical two times a day  clopidogrel Tablet 75 milliGRAM(s) Oral daily  gabapentin 100 milliGRAM(s) Oral every 8 hours  insulin lispro (ADMELOG) corrective regimen sliding scale   SubCutaneous Before meals and at bedtime  melatonin 5 milliGRAM(s) Oral at bedtime  metoprolol tartrate 12.5 milliGRAM(s) Oral two times a day  oxyCODONE    IR 10 milliGRAM(s) Oral every 4 hours PRN  oxyCODONE    IR 5 milliGRAM(s) Oral every 4 hours PRN  pantoprazole    Tablet 40 milliGRAM(s) Oral daily  polyethylene glycol 3350 17 Gram(s) Oral daily  senna 2 Tablet(s) Oral at bedtime  sodium chloride 0.9%. 1000 milliLiter(s) IV Continuous <Continuous>  sodium chloride 0.9%. 1000 milliLiter(s) IV Continuous <Continuous>

## 2024-04-28 NOTE — HOME OXYGEN EVALUATION NOTE - NSHOMEOXYEVAL_PHYATT_PORTOXY_GEN_ALL_CORE
Patient needs home and portable oxygen as the patient is mobile in the home.

## 2024-04-28 NOTE — DISCHARGE NOTE NURSING/CASE MANAGEMENT/SOCIAL WORK - NSDCPEFALRISK_GEN_ALL_CORE
For information on Fall & Injury Prevention, visit: [email or web address] OR  [email or web address] OR  https://www.cdc.gov/steadi/patient.html

## 2024-04-28 NOTE — PROGRESS NOTE ADULT - PROBLEM SELECTOR PLAN 6
- Continue GI ppx with Protonix  - SCDs  - SQH  - CHG while invasive lines in place
- Continue GI ppx with Protonix  - SCDs  - SQH for DVT prophylaxis
- Continue GI ppx with Protonix  - SCDs  - SQH for DVT prophylaxis
- Continue GI ppx with Protonix  - SCDs  - SQH on hold due to thrombocytopenia
Continue GI ppx with Protonix  DVT ppx with HSQ and SCD boots
- Continue GI ppx with Protonix  - SCDs  - SQH  - CHG while invasive lines in place
- Continue GI ppx with Protonix  - SCDs  - SQH on hold due to thrombocytopenia
- Continue GI ppx with Protonix  - SCDs  - SQH for DVT prophylaxis
Continue GI ppx with Protonix  SCDs while on bedrest  DVT prophylaxis when clinically appropriate due to high chest tube output  CHG while invasive lines in place

## 2024-04-28 NOTE — DISCHARGE NOTE NURSING/CASE MANAGEMENT/SOCIAL WORK - CAREGIVER ADDRESS
Maris Gonzalez MD, Aisha Ugarte MD, Bridget Perez MD, Gurpreet Fernandez MD, [facility]      [address], [address] [facility]   PH: [phone]  F: [phone]     Subjective:     Patient ID: Cory Fragoso is a 79 y.o. male, referred to the sleep center for   Chief Complaint   Patient presents with    Daytime Sleepiness    2 Week Follow-Up   . Referring Evern Sero     History:  Snoring, apnea EDS 25-30 yrs. Had PSG 20 yrs ago. Was given CPAP. It worked; slept better. Was not v sleepy during day. His CPAP machine broke 6 wks ago; it is not working. 6/12/19    Has been sleepy, tired during day   Has h/o snoring, apnea.   PSG 5/29/19  AHI 10;  Min  o2  Sat  86%  Shall proceed with APAP  Social History     Socioeconomic History    Marital status:      Spouse name: Not on file    Number of children: Not on file    Years of education: Not on file    Highest education level: Not on file   Occupational History    Not on file   Social Needs    Financial resource strain: Not on file    Food insecurity:     Worry: Not on file     Inability: Not on file    Transportation needs:     Medical: Not on file     Non-medical: Not on file   Tobacco Use    Smoking status: Never Smoker    Smokeless tobacco: Never Used   Substance and Sexual Activity    Alcohol use: No    Drug use: Not on file    Sexual activity: Not on file   Lifestyle    Physical activity:     Days per week: Not on file     Minutes per session: Not on file    Stress: Not on file   Relationships    Social connections:     Talks on phone: Not on file     Gets together: Not on file     Attends [employer] service: Not on file     Active member of club or organization: Not on file     Attends meetings of clubs or organizations: Not on file     Relationship status: Not on file    Intimate partner violence:     Fear of Provider, MD   [facility] (OSTEO BI-FLEX JOINT SHIELD PO) Take 2 tablets by mouth daily. Yes Historical Provider, MD   citalopram (CELEXA) 10 MG tablet Take 10 mg by mouth daily    Historical Provider, MD   citalopram (CELEXA) 20 MG tablet  6/28/17   Historical Provider, MD   famotidine (PEPCID) 20 MG tablet Take 1 tablet by mouth 2 times daily for 7 days. 4/17/13 4/24/13  [address], MD   montelukast (SINGULAIR) 10 MG tablet Take 10 mg by mouth nightly. 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 Objective: There were no vitals filed for this visit. Inches  [place] -      Gen: No distress. Eyes: PERRL. No sclera icterus. No conjunctival injection. ENT: No discharge. Pharynx clear. External appearance of ears and nose normal.  Neck: Trachea midline. No obvious mass. Resp: No accessory muscle use. No crackles. No wheezes. No rhonchi. No dullness on percussion. CV: Regular rate. Regular rhythm. No murmur or rub. No edema. GI: Non-tender. Non-distended. No hernia. Skin: Warm, dry, normal texture and turgor. No nodule on exposed extremities. Lymph: No cervical LAD. No supraclavicular LAD. M/S: No cyanosis. No clubbing. No joint deformity. Psych: Oriented x 3.  No anxiety. Awake. Alert. Intact judgement and insight.     Mallampati Airway Classification:   []1 []2 []3 [x]4        Sleep Complaints/Symptoms:    Normal Bedtime:      Normal Wake Time:   Average Sleep Time:  7-8 hrs    Number of Awakenings: 2-4    Duration of Sleep Complaints: 25-30  years    [x] Snoring     [] Improved [] Not Improved    [] Choking/Gasping for Breath  [] Improved [] Not Improved       [x] Witnessed Apneas              [] Improved [] Not Improved  [x] Daytime Sleepiness             [] Improved [] Not Improved  [] Morning Headaches    [] Improved [] Not Improved  [] Frequent Awakenings       [] Improved [] Not Improved  [] Jerky Movements   [] Improved [] Not Improved   [] Restless Legs   [] Improved [] Not Improved   [] Difficulty Initiating Sleep  [] Improved [] Not Improved   [] Difficulty Maintaining Sleep  [] Improved [] Not Improved   [] Restless Sleep    [] Improved [] Not Improved   [] Sleep Paralysis    [] Improved [] Not Improved   [] Muscle Weakness w/ Emotion  [] Improved [] Not Improved  [] Other :     CPAP Usage:    []  Patient has never worn CPAP  [x]  Patient has worn CPAP previously but discontinued use; machinebroke  []  Current PAP user,  [years]   []  Patient Tolerates Well   []  Patient Does Not Tolerate     []  Patient Uses CPAP      []  More Than 4 Hours      []  Less Than 4 Hours  []  CPAP/BPAP/ASV Pressure Readings   []  CPAP Pressure      cm H20   []  BPAP Pressure       cm H20   []  ASV Pressure         cm H20      Assessment:      Diagnosis:     ALFREDA  Hypersomnia    Patient Active Problem List    Diagnosis Date Noted    [facility]     Plan:        Sleep Study:     [x]  Sleep hygiene/ relaxation methods & CBTi principles review with patient     []  HST - Home Sleep Study   []  PSG - Overnight Diagnostic Polysomnogram     [x]  APAP Titration    [] Split Night Study    [] BiLevel Titration    [] ASV - Auto-Servo Ventilation Titration       []  MSLT - Multiple Sleep Latency Test   []  MWT - Maintenance of Wakefulness Test    CPAP Therapy:     []  Patient to be seen for new mask fitting/desensitization   []  AutoPAP Titration    []  CPAP supplies and equipment at ________cmH2O    []  Continue same CPAP pressure   []  Change CPAP pressure to _______cm H2O   []  CPAP supplies only, no pressure change   []  Refer for an oral appliance       Medications:       []  Continue current medication    []  Add Medication:  ________________    Follow-Up:     []  No follow up required. Patient to return as needed. []  2 weeks   [x]  4 weeks   []  2 months   []  4 months   []  6 months   []  1 year for CPAP compliance evaluation. Patient to return sooner, as needed. []  Follow up after sleep study   []  Other: ______________    No orders of the defined types were placed in this encounter.          Electronically signed by Brandon Dickinson MD on 6/12/2019 at 11:30 AM [address] [place] [address], [place], [place]

## 2024-04-28 NOTE — PROGRESS NOTE ADULT - PROVIDER SPECIALTY LIST ADULT
CT Surgery
Critical Care
Nephrology
Pulmonology
Pulmonology
Critical Care
Nephrology
Pulmonology
Pulmonology
Critical Care
Critical Care
Nephrology
CT Surgery

## 2024-04-28 NOTE — PROGRESS NOTE ADULT - PROBLEM SELECTOR PLAN 2
CKD3 baseline sCr 1.4 (am labs at [facility] 1.8, 1.59 on 4/17)  -dye load with [facility] 4/17  - hold home Eplerenone  - Adjust meds pr CrCl  - Monitoring urine output, I&OS,   - Avoid nephrotoxic agents  - Rojo removed 4/23 AM; passed trial of void
CKD3 baseline sCr 1.4 (am labs at [facility] 1.8, 1.59 on 4/17)  -dye load with [facility] 4/17  - hold home Eplerenone  - Adjust meds pr CrCl  - Monitoring urine output, I&OS,   - Avoid nephrotoxic agents  - Rojo removed 4/23 AM; passed trial of void
CKD3 baseline sCr 1.4 (am labs at [facility] 1.8, 1.59 on 4/17)  -dye load with [facility] 4/17  - hold home Eplerenone  - Adjust meds pr CrCl  - Monitoring urine output, I&OS,   - Avoid nephrotoxic agents  - Rojo removed 4/23 AM; passed trial of void
CKD3 baseline sCr 1.4 (am labs at [facility] 1.8, 1.59 on 4/17)  -dye load with [facility] 4/17  - hold home Eplerenone  - Adjust meds pr CrCl  - Monitoring urine output, I&OS,   - Avoid nephrotoxic agents  - Rojo removed 4/23 AM; passed trial of void
CKD3 baseline sCr 1.4 (am labs at [facility] 1.8, 1.59 on 4/17)  dye load with [facility] 4/17  hold home Eplerenone  Adjust meds pr CrCl  Monitoring urine output, I&OS  Avoid nephrotoxic agents  Renal consult - appreciate further perioperative recommendations
CKD3 baseline sCr 1.4 (am labs at [facility] 1.8, 1.59 on 4/17)  -dye load with [facility] 4/17  - hold home Eplerenone  - Adjust meds pr CrCl  - Monitoring urine output, I&OS, larkin in place  - Avoid nephrotoxic agents  - Renal following
CKD3 baseline sCr 1.4 (am labs at [facility] 1.8, 1.59 on 4/17)  -dye load with [facility] 4/17  - hold home Eplerenone  - Adjust meds pr CrCl  - Monitoring urine output, I&OS, larkin in place  - Avoid nephrotoxic agents  - Renal following
CKD3 baseline sCr 1.4 (am labs at [facility] 1.8, 1.59 on 4/17)  -dye load with [facility] 4/17  - hold home Eplerenone  - Adjust meds pr CrCl  - Monitoring urine output, I&OS,   - Avoid nephrotoxic agents  - Rojo removed 4/23 AM; passed trial of void
CKD3 baseline sCr 1.4 (am labs at [facility] 1.8, 1.59 on 4/17)  -dye load with [facility] 4/17  - hold home Eplerenone  - Adjust meds pr CrCl  - Monitoring urine output, I&OS,   - Avoid nephrotoxic agents  - Renal following; discuss timing of larkin removal in AM
CKD3 baseline sCr 1.4 (am labs at [facility] 1.8, 1.59 on 4/17)  -dye load with [facility] 4/17  - hold home Eplerenone  - Adjust meds pr CrCl  - Monitoring urine output, I&OS,   - Avoid nephrotoxic agents  - Renal following
CKD3 baseline sCr 1.4 (am labs at [facility] 1.8)  dye load with [facility] today  hold home Eplerenone  add gentle IVF hydration. Cr 1.59 on admission to [facility]  Adjust meds pr CrCl  Monitoring urine output, I&OS  Follow chemistry  Avoid nephrotoxic agents  Renal consult in AM

## 2024-04-29 ENCOUNTER — NON-APPOINTMENT (OUTPATIENT)
Age: 84
End: 2024-04-29

## 2024-04-29 PROBLEM — J45.909 UNSPECIFIED ASTHMA, UNCOMPLICATED: Chronic | Status: ACTIVE | Noted: 2024-04-17

## 2024-04-29 PROBLEM — N18.30 CHRONIC KIDNEY DISEASE, STAGE 3 UNSPECIFIED: Chronic | Status: ACTIVE | Noted: 2024-04-17

## 2024-04-29 PROBLEM — Z87.09 PERSONAL HISTORY OF OTHER DISEASES OF THE RESPIRATORY SYSTEM: Chronic | Status: ACTIVE | Noted: 2024-04-17

## 2024-04-29 PROBLEM — Z87.891 PERSONAL HISTORY OF NICOTINE DEPENDENCE: Chronic | Status: ACTIVE | Noted: 2024-04-17

## 2024-04-29 PROBLEM — E78.5 HYPERLIPIDEMIA, UNSPECIFIED: Chronic | Status: ACTIVE | Noted: 2024-04-17

## 2024-04-29 PROBLEM — Z78.9 OTHER SPECIFIED HEALTH STATUS: Chronic | Status: ACTIVE | Noted: 2024-04-17

## 2024-04-29 PROBLEM — I25.10 ATHEROSCLEROTIC HEART DISEASE OF NATIVE CORONARY ARTERY WITHOUT ANGINA PECTORIS: Chronic | Status: ACTIVE | Noted: 2024-04-17

## 2024-04-29 PROBLEM — Z86.79 PERSONAL HISTORY OF OTHER DISEASES OF THE CIRCULATORY SYSTEM: Chronic | Status: ACTIVE | Noted: 2024-04-17

## 2024-04-29 PROBLEM — J86.9 PYOTHORAX WITHOUT FISTULA: Chronic | Status: ACTIVE | Noted: 2024-04-17

## 2024-04-29 PROBLEM — J18.9 PNEUMONIA, UNSPECIFIED ORGANISM: Chronic | Status: ACTIVE | Noted: 2024-04-17

## 2024-05-01 ENCOUNTER — APPOINTMENT (OUTPATIENT)
Dept: CARE COORDINATION | Facility: HOME HEALTH | Age: 84
End: 2024-05-01
Payer: MEDICARE

## 2024-05-01 ENCOUNTER — TRANSCRIPTION ENCOUNTER (OUTPATIENT)
Age: 84
End: 2024-05-01

## 2024-05-01 VITALS
HEIGHT: 65 IN | OXYGEN SATURATION: 96 % | HEART RATE: 84 BPM | SYSTOLIC BLOOD PRESSURE: 108 MMHG | RESPIRATION RATE: 16 BRPM | DIASTOLIC BLOOD PRESSURE: 68 MMHG | BODY MASS INDEX: 20.06 KG/M2 | WEIGHT: 120.4 LBS

## 2024-05-01 PROCEDURE — 99024 POSTOP FOLLOW-UP VISIT: CPT

## 2024-05-01 RX ORDER — EPLERENONE 25 MG/1
25 TABLET, COATED ORAL
Qty: 90 | Refills: 2 | Status: DISCONTINUED | COMMUNITY
Start: 2022-10-06 | End: 2024-05-01

## 2024-05-01 RX ORDER — AMLODIPINE BESYLATE 10 MG/1
10 TABLET ORAL DAILY
Qty: 90 | Refills: 3 | Status: DISCONTINUED | COMMUNITY
Start: 2021-12-15 | End: 2024-05-01

## 2024-05-01 RX ORDER — SENNOSIDES 8.6 MG TABLETS 8.6 MG/1
8.6 TABLET ORAL
Refills: 0 | Status: ACTIVE | COMMUNITY

## 2024-05-01 RX ORDER — CARVEDILOL 6.25 MG/1
6.25 TABLET, FILM COATED ORAL EVERY OTHER DAY
Refills: 0 | Status: DISCONTINUED | COMMUNITY
Start: 2023-05-23 | End: 2024-05-01

## 2024-05-01 NOTE — PHYSICAL EXAM
[Sclera] : the sclera and conjunctiva were normal [Neck Appearance] : the appearance of the neck was normal [Respiration, Rhythm And Depth] : normal respiratory rhythm and effort [Exaggerated Use Of Accessory Muscles For Inspiration] : no accessory muscle use [Auscultation Breath Sounds / Voice Sounds] : lungs were clear to auscultation bilaterally [Apical Impulse] : the apical impulse was normal [Heart Rate And Rhythm] : heart rate was normal and rhythm regular [Heart Sounds] : normal S1 and S2 [Murmurs] : no murmurs [Chest Visual Inspection Thoracic Asymmetry] : no chest asymmetry [1+] : left 1+ [Abnormal Walk] : normal gait [Skin Color & Pigmentation] : normal skin color and pigmentation [Skin Turgor] : normal skin turgor [] : no rash [FreeTextEntry2] : B/L LE with +2 pedal  edema, B/L calves soft, NT [Bowel Sounds] : normal bowel sounds [Abdomen Soft] : soft [FreeTextEntry1] : at baseline [Oriented To Time, Place, And Person] : oriented to person, place, and time [Impaired Insight] : insight and judgment were intact [Affect] : the affect was normal

## 2024-05-01 NOTE — HISTORY OF PRESENT ILLNESS
[FreeTextEntry1] : 84 male PMHx CAD, AS, HLD, HTN, asthma, COPD, former smoker (4.5 cigars/daily, ~ 50years, quit 2018), Statin intolerance, CKD3 (baseline sCr 1.4), PNA/Empyema, pericarditis and possible adrenal tumor/ pheochromocytoma. In 2019 STEMI and underwent cath which showed moderate TVD and normal EF - no intervention was diagnosed with pericarditis. Has close follow up with his cardiologist and nephrologist for new onset secondary HTN (reportedly SBP 200s). Imaging in 1/2022 noted 8mm left adrenal nodule (repeat 1/2023 showing stable appearance), pheochromocytoma workup indeterminate. MR abdomen cannot rule out neuroendocrine tumor. Was started on beta blocker and diuretics with improvement in blood pressure. Patient with progressive ROJO and intermittent lower extremity edema. Had CTA of coronaries to follow up on progression of his CAD which revealed total calcium score of 3065. LHC then revealed LM and severe TVD. Transfered to [facility] for [facility]G eval on 4/17. Renal clearance for possible pheochromocytoma obtained; patient now s/p CABGx3 /AVR on 4/18 with Dr. Cho. Intraoperative course with 2x PRBC, 3x PLT, 10 cryo; post op course with elevated chest tube output requiring additional 4x PRBC, 2x FFP, 500 feiba and lactic acidosis.  Post extubation respiratory support provided via High FLow nasal cannula, now weaned off to low flow oxygen via NC.  His LISA completely resolved and he passed trial of void without issue. Notably had persistently moderate O2 requirements 3-4L prompting CT chest noncontrast 4/24 which revealed large left extrapleural hematoma which then began auto-evacuating via left chest tube site; as drainage occurred O2 requirements also diminished, but still required home O2 at discharge after walk test. Pt remained hemodynamically stable and discharged home with support of spouse/family, 24/7 RN agency and the [facility] team. Initial visit completed in home. CC "I'm doing a little better every day"

## 2024-05-01 NOTE — REVIEW OF SYSTEMS
[Lower Ext Edema] : lower extremity edema [Negative] : Psychiatric [Heart Rate Is Slow] : the heart rate was not slow [Heart Rate Is Fast] : the heart rate was not fast [Chest Pain] : no chest pain [Palpitations] : no palpitations [FreeTextEntry7] : last BM yesterday no issues

## 2024-05-01 NOTE — REASON FOR VISIT
[Follow-Up: _____] : a [unfilled] follow-up visit [Formal Caregiver] : formal caregiver [FreeTextEntry1] : FOLLOW YOUR HEART- Transitional Care Management Program- [facility]

## 2024-05-17 ENCOUNTER — APPOINTMENT (OUTPATIENT)
Dept: CARDIOTHORACIC SURGERY | Facility: CLINIC | Age: 84
End: 2024-05-17
Payer: MEDICARE

## 2024-05-17 ENCOUNTER — OUTPATIENT (OUTPATIENT)
Dept: OUTPATIENT SERVICES | Facility: HOSPITAL | Age: 84
LOS: 1 days | End: 2024-05-17
Payer: MEDICARE

## 2024-05-17 VITALS
HEART RATE: 71 BPM | RESPIRATION RATE: 16 BRPM | DIASTOLIC BLOOD PRESSURE: 78 MMHG | WEIGHT: 111 LBS | SYSTOLIC BLOOD PRESSURE: 124 MMHG | BODY MASS INDEX: 18.49 KG/M2 | OXYGEN SATURATION: 96 % | HEIGHT: 65 IN

## 2024-05-17 DIAGNOSIS — Z95.2 PRESENCE OF PROSTHETIC HEART VALVE: ICD-10-CM

## 2024-05-17 DIAGNOSIS — I25.10 ATHEROSCLEROTIC HEART DISEASE OF NATIVE CORONARY ARTERY WITHOUT ANGINA PECTORIS: ICD-10-CM

## 2024-05-17 DIAGNOSIS — Z95.1 PRESENCE OF AORTOCORONARY BYPASS GRAFT: ICD-10-CM

## 2024-05-17 PROCEDURE — 99024 POSTOP FOLLOW-UP VISIT: CPT

## 2024-05-17 PROCEDURE — 71046 X-RAY EXAM CHEST 2 VIEWS: CPT | Mod: 26

## 2024-05-17 PROCEDURE — 71046 X-RAY EXAM CHEST 2 VIEWS: CPT

## 2024-05-17 NOTE — ASSESSMENT
[FreeTextEntry1] : Today on exam patient's lungs clear bilaterally, sternum stable, incision clean, dry and intact. SVG site is clean, dry and intact. Sutures removed. Mr Solis has fragile thin skin and a thin layer of bacitracin was applied to the skin for moisture and infection prevention. No peripheral edema noted. Instructed patient on importance of optimal glycemic control, daily showering, daily weights, incentive spirometer use, and increase ambulation as tolerated. Instructed to call office with any signs or symptoms of infection or weight gain of 2 or more pounds in 1 day or 3 or more pounds in 1 week. Endocarditis prophylaxis was discussed.   Overall I am very impressed with his recovery. He is ambulating independently and is anxious to get back to his routine. The above was discussed with Dr Cho and the patient will be following up with his Cardiology care on May 30th with Dr Blackburn.  PLAN: - Continue to increase activity as tolerated - Continue care with Cardiology - Return to care as needed

## 2024-05-17 NOTE — DISCUSSION/SUMMARY
[Doing Well] : is doing well [Excellent Pain Control] : has excellent pain control [No Sign of Infection] : is showing no signs of infection [Remove Sutures/Staples] : removed sutures/staples [FreeTextEntry1] : PRE-BYPASS FINDINGS  Left Ventricle: Left ventricular ejection fraction is estimated at 55 to 60%. The left ventricular cavity size is normal in size. Normal left ventricular global function.  Right Ventricle: The right ventricular cavity is normal in size. Normal right ventricular global function.  Left Atrium: There is no evidence of left atrial appendage thrombus.  Aortic Valve: There is severe aortic stenosis. There is mild aortic regurgitation.  Mitral Valve: There is no mitral valve stenosis. There is no mitral valve stenosis. There is trace mitral regurgitation.  Aorta: The ascending aorta diameter is normal in size. The aortic arch is of normal size. The descending aorta is normal in size.  Pericardium: No pericardial effusion seen.  Post-Bypass: No changes from baseline. Normal biventricular function. LV: unchanged, normal size and fucntion, EF 55-60% by visual estimation RV: unchanged, normal size and function No MICAELA No pericardial effusion. Probe removed atraumatically, no blood. The patient tolerated the procedure well and without complications. Permanent recorded images are stored in the medical record.

## 2024-05-17 NOTE — REASON FOR VISIT
[de-identified] : Aortic Valve Replacement #23 [employer] Bovine valve with coronary artery bypass grafting x 3 (LIMA-LAD, SVG-OM, SVG-PDA) [de-identified] : 04/18/24 [de-identified] : Post op course with elevated chest tube output requiring additional 4x PRBC, 2x FFP, 500 feiba and lactic acidosis.  Post extubation respiratory support provided via High Flow nasal cannula, now weaned off to low flow oxygen via NC.  His LISA completely resolved and he passed trial of void without issue. Notably had persistently moderate O2 requirements 3-4L prompting CT chest noncontrast 4/24 which revealed large left extrapleural hematoma which then began auto-evacuating via left chest tube site; as drainage occurred O2 requirements also diminished, but still required home O2 at discharge after walk test.

## 2024-05-21 ENCOUNTER — NON-APPOINTMENT (OUTPATIENT)
Age: 84
End: 2024-05-21

## 2024-05-24 ENCOUNTER — APPOINTMENT (OUTPATIENT)
Dept: PULMONOLOGY | Facility: CLINIC | Age: 84
End: 2024-05-24
Payer: MEDICARE

## 2024-05-24 VITALS — DIASTOLIC BLOOD PRESSURE: 79 MMHG | HEART RATE: 65 BPM | OXYGEN SATURATION: 96 % | SYSTOLIC BLOOD PRESSURE: 133 MMHG

## 2024-05-24 DIAGNOSIS — J90 PLEURAL EFFUSION, NOT ELSEWHERE CLASSIFIED: ICD-10-CM

## 2024-05-24 PROCEDURE — 99214 OFFICE O/P EST MOD 30 MIN: CPT

## 2024-05-24 RX ORDER — FUROSEMIDE 40 MG/1
40 TABLET ORAL
Refills: 0 | Status: DISCONTINUED | COMMUNITY
End: 2024-05-24

## 2024-05-24 NOTE — DISCUSSION/SUMMARY
[FreeTextEntry1] : S/P CABG and AVR Post op unilateral left pleural effusion New right upper lobe density resolved.  Appears to have been infectious inflammatory in origin. Bronchiectasis stable without recent exacerbations. Chronic obstructive pulmonary disease stable clinically.  Mild decrease in function today but without active wheezing or significant change in symptom complex.  Will follow. 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  Improved. Cigar smoker discontinued 4-5 years. Significant cigar use prior. No cigs.

## 2024-05-24 NOTE — ASSESSMENT
[FreeTextEntry1] : Continue observation. F/U 6 weeks repeat CXR. May need thoracentesis. Continue bronchodilator therapy. Follow-up CT in 1 year task sent as reminder. Follow-up early in the case of respiratory infections.   35 minutes spent in evaluation management and review of studies.

## 2024-05-24 NOTE — PHYSICAL EXAM
[No Acute Distress] : no acute distress [Supple] : supple [No JVD] : no jvd [Murmur ___ / 6] : murmur [unfilled] / 6 [Normal S1, S2] : normal s1, s2 [Clear to Auscultation Bilaterally] : clear to auscultation bilaterally [Normal to Percussion] : normal to percussion [No Abnormalities] : no abnormalities [Benign] : benign [No HSM] : no hsm [No Clubbing] : no clubbing [No Cyanosis] : no cyanosis [No Edema] : no edema [Oriented x3] : oriented x3 [General Appearance - Well Developed] : well developed [General Appearance - Well Nourished] : well nourished [Normal Oropharynx] : normal oropharynx [Jugular Venous Distention Increased] : there was no jugular-venous distention [Heart Sounds] : normal S1 and S2 [Murmurs] : no murmurs present [Auscultation Breath Sounds / Voice Sounds] : lungs were clear to auscultation bilaterally [Lungs Percussion] : the lungs were normal to percussion [Abdomen Soft] : soft [Abdomen Tenderness] : non-tender [] : no hepato-splenomegaly [Nail Clubbing] : no clubbing of the fingernails [Cyanosis, Localized] : no localized cyanosis [TextBox_68] : Decreased BS left base with dullness.

## 2024-05-24 NOTE — HISTORY OF PRESENT ILLNESS
[TextBox_4] : Had triple bypass and aortic valve replacement at [facility] 5 weeks ago after a Ct cardiac scan and angiogram  doing well no issues with breathing on Breo on Eliquis and metoprolol did have pleural effusion in hospital and given diuretics 40 mg and Aldactone d/c until 20th CXR done1 week ago and now on Lasix 20 mg daily 3 days ago with help in leg edema and sob No recent respiratory infections. no  proair inhaler use BP has been Ok. Did see surgeion post op.

## 2024-05-30 ENCOUNTER — APPOINTMENT (OUTPATIENT)
Dept: CARDIOLOGY | Facility: CLINIC | Age: 84
End: 2024-05-30
Payer: MEDICARE

## 2024-05-30 ENCOUNTER — NON-APPOINTMENT (OUTPATIENT)
Age: 84
End: 2024-05-30

## 2024-05-30 VITALS
OXYGEN SATURATION: 100 % | BODY MASS INDEX: 19.16 KG/M2 | WEIGHT: 115 LBS | HEART RATE: 64 BPM | HEIGHT: 65 IN | DIASTOLIC BLOOD PRESSURE: 80 MMHG | SYSTOLIC BLOOD PRESSURE: 128 MMHG

## 2024-05-30 DIAGNOSIS — J44.9 CHRONIC OBSTRUCTIVE PULMONARY DISEASE, UNSPECIFIED: ICD-10-CM

## 2024-05-30 DIAGNOSIS — I30.0 ACUTE NONSPECIFIC IDIOPATHIC PERICARDITIS: ICD-10-CM

## 2024-05-30 DIAGNOSIS — Z79.01 LONG TERM (CURRENT) USE OF ANTICOAGULANTS: ICD-10-CM

## 2024-05-30 DIAGNOSIS — I25.10 ATHEROSCLEROTIC HEART DISEASE OF NATIVE CORONARY ARTERY W/OUT ANGINA PECTORIS: ICD-10-CM

## 2024-05-30 DIAGNOSIS — R93.89 ABNORMAL FINDINGS ON DIAGNOSTIC IMAGING OF OTHER SPECIFIED BODY STRUCTURES: ICD-10-CM

## 2024-05-30 PROCEDURE — 99215 OFFICE O/P EST HI 40 MIN: CPT

## 2024-05-30 PROCEDURE — 93000 ELECTROCARDIOGRAM COMPLETE: CPT

## 2024-05-30 RX ORDER — FAMOTIDINE 10 MG/1
10 TABLET, FILM COATED ORAL
Refills: 0 | Status: ACTIVE | COMMUNITY

## 2024-05-30 NOTE — DISCUSSION/SUMMARY
[FreeTextEntry1] : Continue current meds. especially lipid meds.Lipids were well controlled.,  But he became intolerant to the statins and his numbers alison.  I will not prescribe Repatha.  However he thinks his joint pains may be from his statin which he will stop for a month to see if he feels better.  If he does I will switch him to Repatha. Lipid profile and HgbA1c was excellent  He remains in A-fib at a controlled rate and will take the Eliquis 2.5 twice daily. I will see him again in a month and hopefully begin to titrate some of his medications and consider cardioversion.   [EKG obtained to assist in diagnosis and management of assessed problem(s)] : EKG obtained to assist in diagnosis and management of assessed problem(s)

## 2024-05-30 NOTE — HISTORY OF PRESENT ILLNESS
[FreeTextEntry1] : he has no chest pain He has exertional  shortness of breath He has no palpitations He has no syncope He is neurologically intact He has no edema He has no GI symptoms  fatigue

## 2024-05-30 NOTE — REASON FOR VISIT
[FreeTextEntry1] : I saw this 84-year-old man in followup consultation on  05/30/24 Going through an extensive work-up to try and diagnosed if he has an adrenal tumor, and as a result has been put on large doses of beta-blocker.  His main complaint is fatigue and shortness of breath on activity and his resting heart rate is 49. I will start to reduce his Coreg, to allow him to increase his heart rate with activity.This improved to 55 so I will reduce the Coreg further to alternate day 1 dose of -6.5 mg If his blood pressure remains elevated after this he will increase the Norvasc to 10 mg daily  We will repeat the echo to reassess his aortic stenosis.  This was unchanged Was planning a CTA but have to watch his renal function. Not improved   He presented to the hospital 09/19 with chest pain and ST elevation and underwent emergent angiography, which showed moderate triple-vessel disease and a normal left ventricle. He was diagnosed as having pericarditis, with a pericardial rub auscultated and a CT scan showing a small pericardial effusion. He also had patchy bilateral pneumonia with a high white count and was treated with antibiotics, and colchicine for the pericarditis. Within 48 hours his pain was completely resolved, and his white count came down, and he was discharged on medication. He has been doing well with no recurrence, functioning at a high level without symptoms On medication his lipid profile is excellent He comes in today because he has been experiencing exertional dyspnea and excessive spikes of his blood pressure. He is being worked up for a renal origin of his blood pressure spikes He stopped his statin which he was intolerant to, and I will prescribe Repatha He finally had a cardiac CT which showed triple-vessel coronary disease, underwent cardiac cath and was transferred to [facility] for three-vessel CABG and aortic valve replacement.  He tolerated this well and is 6 weeks postop and recuperating well..  [Follow-Up - Clinic] : a clinic follow-up of [Coronary Artery Disease] : coronary artery disease [Hyperlipidemia] : hyperlipidemia [Hypertension] : hypertension

## 2024-06-04 RX ORDER — FUROSEMIDE 20 MG/1
20 TABLET ORAL
Qty: 90 | Refills: 1 | Status: ACTIVE | COMMUNITY
Start: 2024-05-24

## 2024-06-10 ENCOUNTER — RESULT REVIEW (OUTPATIENT)
Age: 84
End: 2024-06-10

## 2024-06-19 LAB
BASE EXCESS BLDA CALC-SCNC: -1.1 MMOL/L — SIGNIFICANT CHANGE UP (ref -2–3)
BASE EXCESS BLDA CALC-SCNC: -1.9 MMOL/L — SIGNIFICANT CHANGE UP (ref -2–3)
BASE EXCESS BLDA CALC-SCNC: -2.6 MMOL/L — LOW (ref -2–3)
BASE EXCESS BLDA CALC-SCNC: -3.4 MMOL/L — LOW (ref -2–3)
BASE EXCESS BLDA CALC-SCNC: -4.4 MMOL/L — LOW (ref -2–3)
BASE EXCESS BLDA CALC-SCNC: -5.2 MMOL/L — LOW (ref -2–3)
BASE EXCESS BLDA CALC-SCNC: 0.9 MMOL/L — SIGNIFICANT CHANGE UP (ref -2–3)
BASE EXCESS BLDA CALC-SCNC: 4.6 MMOL/L — HIGH (ref -2–3)
BASE EXCESS BLDV CALC-SCNC: -0.8 MMOL/L — SIGNIFICANT CHANGE UP (ref -2–3)
BASE EXCESS BLDV CALC-SCNC: -1 MMOL/L — SIGNIFICANT CHANGE UP (ref -2–3)
BASE EXCESS BLDV CALC-SCNC: -1.6 MMOL/L — SIGNIFICANT CHANGE UP (ref -2–3)
BASE EXCESS BLDV CALC-SCNC: -2.6 MMOL/L — LOW (ref -2–3)
BASE EXCESS BLDV CALC-SCNC: -8.3 MMOL/L — LOW (ref -2–3)
CA-I BLDA-SCNC: 0.91 MMOL/L — LOW (ref 1.15–1.33)
CA-I BLDA-SCNC: 0.93 MMOL/L — LOW (ref 1.15–1.33)
CA-I BLDA-SCNC: 0.94 MMOL/L — LOW (ref 1.15–1.33)
CA-I BLDA-SCNC: 0.94 MMOL/L — LOW (ref 1.15–1.33)
CA-I BLDA-SCNC: 0.96 MMOL/L — LOW (ref 1.15–1.33)
CA-I BLDA-SCNC: 1 MMOL/L — LOW (ref 1.15–1.33)
CA-I BLDA-SCNC: 1.16 MMOL/L — SIGNIFICANT CHANGE UP (ref 1.15–1.33)
CA-I BLDA-SCNC: 1.17 MMOL/L — SIGNIFICANT CHANGE UP (ref 1.15–1.33)
CA-I BLDA-SCNC: 1.18 MMOL/L — SIGNIFICANT CHANGE UP (ref 1.15–1.33)
CA-I BLDA-SCNC: 1.21 MMOL/L — SIGNIFICANT CHANGE UP (ref 1.15–1.33)
CA-I SERPL-SCNC: 0.78 MMOL/L — LOW (ref 1.15–1.33)
CA-I SERPL-SCNC: 0.93 MMOL/L — LOW (ref 1.15–1.33)
CA-I SERPL-SCNC: 0.94 MMOL/L — LOW (ref 1.15–1.33)
CA-I SERPL-SCNC: 1.14 MMOL/L — LOW (ref 1.15–1.33)
CA-I SERPL-SCNC: 1.15 MMOL/L — SIGNIFICANT CHANGE UP (ref 1.15–1.33)
CHLORIDE BLDA-SCNC: 104 MMOL/L — SIGNIFICANT CHANGE UP (ref 96–108)
CHLORIDE BLDA-SCNC: 105 MMOL/L — SIGNIFICANT CHANGE UP (ref 96–108)
CHLORIDE BLDA-SCNC: 106 MMOL/L — SIGNIFICANT CHANGE UP (ref 96–108)
CHLORIDE BLDA-SCNC: 107 MMOL/L — SIGNIFICANT CHANGE UP (ref 96–108)
CHLORIDE BLDA-SCNC: 107 MMOL/L — SIGNIFICANT CHANGE UP (ref 96–108)
CHLORIDE BLDA-SCNC: 108 MMOL/L — SIGNIFICANT CHANGE UP (ref 96–108)
CHLORIDE BLDA-SCNC: 108 MMOL/L — SIGNIFICANT CHANGE UP (ref 96–108)
CHLORIDE BLDA-SCNC: 111 MMOL/L — HIGH (ref 96–108)
CHLORIDE BLDV-SCNC: 103 MMOL/L — SIGNIFICANT CHANGE UP (ref 96–108)
CHLORIDE BLDV-SCNC: 104 MMOL/L — SIGNIFICANT CHANGE UP (ref 96–108)
CHLORIDE BLDV-SCNC: 106 MMOL/L — SIGNIFICANT CHANGE UP (ref 96–108)
CHLORIDE BLDV-SCNC: 108 MMOL/L — SIGNIFICANT CHANGE UP (ref 96–108)
CHLORIDE BLDV-SCNC: 109 MMOL/L — HIGH (ref 96–108)
COHGB MFR BLDA: 1.1 % — SIGNIFICANT CHANGE UP
COHGB MFR BLDA: 1.1 % — SIGNIFICANT CHANGE UP
COHGB MFR BLDA: 1.2 % — SIGNIFICANT CHANGE UP
COHGB MFR BLDA: 1.3 % — SIGNIFICANT CHANGE UP
COHGB MFR BLDA: 1.4 % — SIGNIFICANT CHANGE UP
COHGB MFR BLDA: 1.6 % — SIGNIFICANT CHANGE UP
COHGB MFR BLDA: 1.8 % — SIGNIFICANT CHANGE UP
COHGB MFR BLDV: 1.4 % — SIGNIFICANT CHANGE UP
COHGB MFR BLDV: 1.4 % — SIGNIFICANT CHANGE UP
COHGB MFR BLDV: 1.5 % — SIGNIFICANT CHANGE UP
COHGB MFR BLDV: 1.7 % — SIGNIFICANT CHANGE UP
COHGB MFR BLDV: 1.9 % — SIGNIFICANT CHANGE UP
GAS PNL BLDV: 134 MMOL/L — LOW (ref 136–145)
GAS PNL BLDV: 135 MMOL/L — LOW (ref 136–145)
GAS PNL BLDV: 135 MMOL/L — LOW (ref 136–145)
GAS PNL BLDV: 138 MMOL/L — SIGNIFICANT CHANGE UP (ref 136–145)
GAS PNL BLDV: 140 MMOL/L — SIGNIFICANT CHANGE UP (ref 136–145)
GLUCOSE BLDA-MCNC: 102 MG/DL — HIGH (ref 70–99)
GLUCOSE BLDA-MCNC: 129 MG/DL — HIGH (ref 70–99)
GLUCOSE BLDA-MCNC: 131 MG/DL — HIGH (ref 70–99)
GLUCOSE BLDA-MCNC: 134 MG/DL — HIGH (ref 70–99)
GLUCOSE BLDA-MCNC: 148 MG/DL — HIGH (ref 70–99)
GLUCOSE BLDA-MCNC: 152 MG/DL — HIGH (ref 70–99)
GLUCOSE BLDA-MCNC: 187 MG/DL — HIGH (ref 70–99)
GLUCOSE BLDA-MCNC: 194 MG/DL — HIGH (ref 70–99)
GLUCOSE BLDA-MCNC: 198 MG/DL — HIGH (ref 70–99)
GLUCOSE BLDA-MCNC: 200 MG/DL — HIGH (ref 70–99)
GLUCOSE BLDV-MCNC: 115 MG/DL — HIGH (ref 70–99)
GLUCOSE BLDV-MCNC: 133 MG/DL — HIGH (ref 70–99)
GLUCOSE BLDV-MCNC: 146 MG/DL — HIGH (ref 70–99)
GLUCOSE BLDV-MCNC: 147 MG/DL — HIGH (ref 70–99)
GLUCOSE BLDV-MCNC: 192 MG/DL — HIGH (ref 70–99)
HCO3 BLDA-SCNC: 20 MMOL/L — LOW (ref 21–28)
HCO3 BLDA-SCNC: 22 MMOL/L — SIGNIFICANT CHANGE UP (ref 21–28)
HCO3 BLDA-SCNC: 22 MMOL/L — SIGNIFICANT CHANGE UP (ref 21–28)
HCO3 BLDA-SCNC: 23 MMOL/L — SIGNIFICANT CHANGE UP (ref 21–28)
HCO3 BLDA-SCNC: 24 MMOL/L — SIGNIFICANT CHANGE UP (ref 21–28)
HCO3 BLDA-SCNC: 26 MMOL/L — SIGNIFICANT CHANGE UP (ref 21–28)
HCO3 BLDA-SCNC: 29 MMOL/L — HIGH (ref 21–28)
HCO3 BLDV-SCNC: 18 MMOL/L — LOW (ref 22–29)
HCO3 BLDV-SCNC: 23 MMOL/L — SIGNIFICANT CHANGE UP (ref 22–29)
HCO3 BLDV-SCNC: 24 MMOL/L — SIGNIFICANT CHANGE UP (ref 22–29)
HCO3 BLDV-SCNC: 24 MMOL/L — SIGNIFICANT CHANGE UP (ref 22–29)
HCO3 BLDV-SCNC: 25 MMOL/L — SIGNIFICANT CHANGE UP (ref 22–29)
HCT VFR BLDA CALC: 21 % — SIGNIFICANT CHANGE UP
HCT VFR BLDA CALC: 24 % — SIGNIFICANT CHANGE UP
HCT VFR BLDA CALC: 24 % — SIGNIFICANT CHANGE UP
HCT VFR BLDA CALC: 25 % — SIGNIFICANT CHANGE UP
HCT VFR BLDA CALC: 25 % — SIGNIFICANT CHANGE UP
HCT VFR BLDA CALC: 26 % — SIGNIFICANT CHANGE UP
HCT VFR BLDA CALC: 27 % — SIGNIFICANT CHANGE UP
HCT VFR BLDA CALC: 28 % — SIGNIFICANT CHANGE UP
HCT VFR BLDA CALC: 38 % — SIGNIFICANT CHANGE UP
HCT VFR BLDA CALC: 41 % — SIGNIFICANT CHANGE UP
HGB BLD CALC-MCNC: 7.1 G/DL — LOW (ref 12.6–17.4)
HGB BLD CALC-MCNC: 7.9 G/DL — LOW (ref 12.6–17.4)
HGB BLD CALC-MCNC: 8.8 G/DL — LOW (ref 12.6–17.4)
HGB BLD CALC-MCNC: 8.9 G/DL — LOW (ref 12.6–17.4)
HGB BLD CALC-MCNC: 8.9 G/DL — LOW (ref 12.6–17.4)
HGB BLDA-MCNC: 12.5 G/DL — LOW (ref 12.6–17.4)
HGB BLDA-MCNC: 13.6 G/DL — SIGNIFICANT CHANGE UP (ref 12.6–17.4)
HGB BLDA-MCNC: 7.9 G/DL — LOW (ref 12.6–17.4)
HGB BLDA-MCNC: 8.3 G/DL — LOW (ref 12.6–17.4)
HGB BLDA-MCNC: 8.4 G/DL — LOW (ref 12.6–17.4)
HGB BLDA-MCNC: 8.9 G/DL — LOW (ref 12.6–17.4)
HGB BLDA-MCNC: 9 G/DL — LOW (ref 12.6–17.4)
HGB BLDA-MCNC: 9.2 G/DL — LOW (ref 12.6–17.4)
HGB BLDA-MCNC: 9.3 G/DL — LOW (ref 12.6–17.4)
HGB BLDA-MCNC: 9.4 G/DL — LOW (ref 12.6–17.4)
LACTATE BLDA-MCNC: 1.3 MMOL/L — SIGNIFICANT CHANGE UP (ref 0.5–2)
LACTATE BLDA-MCNC: 1.5 MMOL/L — SIGNIFICANT CHANGE UP (ref 0.5–2)
LACTATE BLDA-MCNC: 2.1 MMOL/L — HIGH (ref 0.5–2)
LACTATE BLDA-MCNC: 2.2 MMOL/L — HIGH (ref 0.5–2)
LACTATE BLDA-MCNC: 2.3 MMOL/L — HIGH (ref 0.5–2)
LACTATE BLDA-MCNC: 2.7 MMOL/L — HIGH (ref 0.5–2)
LACTATE BLDA-MCNC: 4.2 MMOL/L — CRITICAL HIGH (ref 0.5–2)
LACTATE BLDA-MCNC: 4.3 MMOL/L — CRITICAL HIGH (ref 0.5–2)
LACTATE BLDA-MCNC: 4.4 MMOL/L — CRITICAL HIGH (ref 0.5–2)
LACTATE BLDA-MCNC: 4.9 MMOL/L — CRITICAL HIGH (ref 0.5–2)
LACTATE BLDV-MCNC: 1.8 MMOL/L — SIGNIFICANT CHANGE UP (ref 0.5–2)
LACTATE BLDV-MCNC: 2.1 MMOL/L — HIGH (ref 0.5–2)
LACTATE BLDV-MCNC: 2.2 MMOL/L — HIGH (ref 0.5–2)
LACTATE BLDV-MCNC: 2.7 MMOL/L — HIGH (ref 0.5–2)
LACTATE BLDV-MCNC: 4 MMOL/L — CRITICAL HIGH (ref 0.5–2)
METHGB MFR BLDA: 0.4 % — SIGNIFICANT CHANGE UP
METHGB MFR BLDA: 0.6 % — SIGNIFICANT CHANGE UP
METHGB MFR BLDA: 0.7 % — SIGNIFICANT CHANGE UP
METHGB MFR BLDA: 0.8 % — SIGNIFICANT CHANGE UP
METHGB MFR BLDA: 0.8 % — SIGNIFICANT CHANGE UP
METHGB MFR BLDA: 0.9 % — SIGNIFICANT CHANGE UP
METHGB MFR BLDA: 1.1 % — SIGNIFICANT CHANGE UP
METHGB MFR BLDA: 1.5 % — SIGNIFICANT CHANGE UP
METHGB MFR BLDV: 0.5 % — SIGNIFICANT CHANGE UP
METHGB MFR BLDV: 0.6 % — SIGNIFICANT CHANGE UP
METHGB MFR BLDV: 0.9 % — SIGNIFICANT CHANGE UP
METHGB MFR BLDV: 1 % — SIGNIFICANT CHANGE UP
METHGB MFR BLDV: 1 % — SIGNIFICANT CHANGE UP
OXYHGB MFR BLDA: 92 % — SIGNIFICANT CHANGE UP (ref 90–95)
OXYHGB MFR BLDA: 97 % — HIGH (ref 90–95)
OXYHGB MFR BLDA: 98 % — HIGH (ref 90–95)
PCO2 BLDA: 37 MMHG — SIGNIFICANT CHANGE UP (ref 35–48)
PCO2 BLDA: 39 MMHG — SIGNIFICANT CHANGE UP (ref 35–48)
PCO2 BLDA: 43 MMHG — SIGNIFICANT CHANGE UP (ref 35–48)
PCO2 BLDA: 44 MMHG — SIGNIFICANT CHANGE UP (ref 35–48)
PCO2 BLDA: 46 MMHG — SIGNIFICANT CHANGE UP (ref 35–48)
PCO2 BLDA: 47 MMHG — SIGNIFICANT CHANGE UP (ref 35–48)
PCO2 BLDA: 57 MMHG — HIGH (ref 35–48)
PCO2 BLDV: 38 MMHG — LOW (ref 42–55)
PCO2 BLDV: 40 MMHG — LOW (ref 42–55)
PCO2 BLDV: 41 MMHG — LOW (ref 42–55)
PCO2 BLDV: 43 MMHG — SIGNIFICANT CHANGE UP (ref 42–55)
PCO2 BLDV: 46 MMHG — SIGNIFICANT CHANGE UP (ref 42–55)
PH BLDA: 7.22 — LOW (ref 7.35–7.45)
PH BLDA: 7.32 — LOW (ref 7.35–7.45)
PH BLDA: 7.35 — SIGNIFICANT CHANGE UP (ref 7.35–7.45)
PH BLDA: 7.36 — SIGNIFICANT CHANGE UP (ref 7.35–7.45)
PH BLDA: 7.36 — SIGNIFICANT CHANGE UP (ref 7.35–7.45)
PH BLDA: 7.38 — SIGNIFICANT CHANGE UP (ref 7.35–7.45)
PH BLDA: 7.39 — SIGNIFICANT CHANGE UP (ref 7.35–7.45)
PH BLDA: 7.41 — SIGNIFICANT CHANGE UP (ref 7.35–7.45)
PH BLDV: 7.29 — LOW (ref 7.32–7.43)
PH BLDV: 7.34 — SIGNIFICANT CHANGE UP (ref 7.32–7.43)
PH BLDV: 7.34 — SIGNIFICANT CHANGE UP (ref 7.32–7.43)
PH BLDV: 7.37 — SIGNIFICANT CHANGE UP (ref 7.32–7.43)
PH BLDV: 7.39 — SIGNIFICANT CHANGE UP (ref 7.32–7.43)
PO2 BLDA: 336 MMHG — HIGH (ref 83–108)
PO2 BLDA: 406 MMHG — HIGH (ref 83–108)
PO2 BLDA: 413 MMHG — HIGH (ref 83–108)
PO2 BLDA: 67 MMHG — LOW (ref 83–108)
PO2 BLDA: >496 MMHG — HIGH (ref 83–108)
PO2 BLDV: 55 MMHG — HIGH (ref 25–45)
PO2 BLDV: 57 MMHG — HIGH (ref 25–45)
PO2 BLDV: 58 MMHG — HIGH (ref 25–45)
PO2 BLDV: 77 MMHG — HIGH (ref 25–45)
PO2 BLDV: 95 MMHG — HIGH (ref 25–45)
POTASSIUM BLDA-SCNC: 4.3 MMOL/L — SIGNIFICANT CHANGE UP (ref 3.5–5.1)
POTASSIUM BLDA-SCNC: 4.4 MMOL/L — SIGNIFICANT CHANGE UP (ref 3.5–5.1)
POTASSIUM BLDA-SCNC: 4.9 MMOL/L — SIGNIFICANT CHANGE UP (ref 3.5–5.1)
POTASSIUM BLDA-SCNC: 5 MMOL/L — SIGNIFICANT CHANGE UP (ref 3.5–5.1)
POTASSIUM BLDA-SCNC: 5 MMOL/L — SIGNIFICANT CHANGE UP (ref 3.5–5.1)
POTASSIUM BLDA-SCNC: 5.4 MMOL/L — HIGH (ref 3.5–5.1)
POTASSIUM BLDA-SCNC: 5.9 MMOL/L — HIGH (ref 3.5–5.1)
POTASSIUM BLDA-SCNC: 5.9 MMOL/L — HIGH (ref 3.5–5.1)
POTASSIUM BLDA-SCNC: 6.3 MMOL/L — CRITICAL HIGH (ref 3.5–5.1)
POTASSIUM BLDA-SCNC: 6.5 MMOL/L — CRITICAL HIGH (ref 3.5–5.1)
POTASSIUM BLDV-SCNC: 5.4 MMOL/L — HIGH (ref 3.5–5.1)
POTASSIUM BLDV-SCNC: 5.5 MMOL/L — HIGH (ref 3.5–5.1)
POTASSIUM BLDV-SCNC: 5.9 MMOL/L — HIGH (ref 3.5–5.1)
POTASSIUM BLDV-SCNC: 6.2 MMOL/L — CRITICAL HIGH (ref 3.5–5.1)
POTASSIUM BLDV-SCNC: 6.3 MMOL/L — CRITICAL HIGH (ref 3.5–5.1)
SAO2 % BLDA: 100 % — HIGH (ref 94–98)
SAO2 % BLDA: 94.8 % — SIGNIFICANT CHANGE UP (ref 94–98)
SAO2 % BLDA: 99.7 % — HIGH (ref 94–98)
SAO2 % BLDA: 99.8 % — HIGH (ref 94–98)
SAO2 % BLDA: 99.8 % — HIGH (ref 94–98)
SAO2 % BLDA: 99.9 % — HIGH (ref 94–98)
SAO2 % BLDV: 90.6 % — HIGH (ref 67–88)
SAO2 % BLDV: 92.2 % — HIGH (ref 67–88)
SAO2 % BLDV: 92.8 % — HIGH (ref 67–88)
SAO2 % BLDV: 97.7 % — HIGH (ref 67–88)
SAO2 % BLDV: 99 % — HIGH (ref 67–88)
SODIUM BLDA-SCNC: 133 MMOL/L — LOW (ref 136–145)
SODIUM BLDA-SCNC: 135 MMOL/L — LOW (ref 136–145)
SODIUM BLDA-SCNC: 135 MMOL/L — LOW (ref 136–145)
SODIUM BLDA-SCNC: 136 MMOL/L — SIGNIFICANT CHANGE UP (ref 136–145)
SODIUM BLDA-SCNC: 136 MMOL/L — SIGNIFICANT CHANGE UP (ref 136–145)
SODIUM BLDA-SCNC: 139 MMOL/L — SIGNIFICANT CHANGE UP (ref 136–145)
SODIUM BLDA-SCNC: 140 MMOL/L — SIGNIFICANT CHANGE UP (ref 136–145)
SODIUM BLDA-SCNC: 140 MMOL/L — SIGNIFICANT CHANGE UP (ref 136–145)
SODIUM BLDA-SCNC: 141 MMOL/L — SIGNIFICANT CHANGE UP (ref 136–145)
SODIUM BLDA-SCNC: 143 MMOL/L — SIGNIFICANT CHANGE UP (ref 136–145)

## 2024-06-25 RX ORDER — ASPIRIN ENTERIC COATED TABLETS 81 MG 81 MG/1
81 TABLET, DELAYED RELEASE ORAL
Qty: 90 | Refills: 3 | Status: ACTIVE | COMMUNITY
Start: 1900-01-01 | End: 1900-01-01

## 2024-06-25 RX ORDER — METOPROLOL TARTRATE 25 MG/1
25 TABLET, FILM COATED ORAL
Qty: 180 | Refills: 3 | Status: ACTIVE | COMMUNITY
Start: 1900-01-01 | End: 1900-01-01

## 2024-06-25 RX ORDER — APIXABAN 2.5 MG/1
2.5 TABLET, FILM COATED ORAL
Qty: 180 | Refills: 1 | Status: ACTIVE | COMMUNITY
Start: 1900-01-01 | End: 1900-01-01

## 2024-06-26 ENCOUNTER — APPOINTMENT (OUTPATIENT)
Dept: RADIOLOGY | Facility: CLINIC | Age: 84
End: 2024-06-26
Payer: MEDICARE

## 2024-06-26 PROCEDURE — 71046 X-RAY EXAM CHEST 2 VIEWS: CPT

## 2024-07-02 ENCOUNTER — APPOINTMENT (OUTPATIENT)
Dept: CARDIOLOGY | Facility: CLINIC | Age: 84
End: 2024-07-02
Payer: MEDICARE

## 2024-07-02 VITALS
OXYGEN SATURATION: 98 % | WEIGHT: 109 LBS | HEART RATE: 70 BPM | BODY MASS INDEX: 18.16 KG/M2 | DIASTOLIC BLOOD PRESSURE: 64 MMHG | HEIGHT: 65 IN | SYSTOLIC BLOOD PRESSURE: 104 MMHG

## 2024-07-02 DIAGNOSIS — E78.5 HYPERLIPIDEMIA, UNSPECIFIED: ICD-10-CM

## 2024-07-02 DIAGNOSIS — I35.0 NONRHEUMATIC AORTIC (VALVE) STENOSIS: ICD-10-CM

## 2024-07-02 DIAGNOSIS — J47.9 BRONCHIECTASIS, UNCOMPLICATED: ICD-10-CM

## 2024-07-02 DIAGNOSIS — I48.91 UNSPECIFIED ATRIAL FIBRILLATION: ICD-10-CM

## 2024-07-02 DIAGNOSIS — Z95.1 PRESENCE OF AORTOCORONARY BYPASS GRAFT: ICD-10-CM

## 2024-07-02 DIAGNOSIS — Z78.9 OTHER SPECIFIED HEALTH STATUS: ICD-10-CM

## 2024-07-02 DIAGNOSIS — I10 ESSENTIAL (PRIMARY) HYPERTENSION: ICD-10-CM

## 2024-07-02 DIAGNOSIS — Z95.2 PRESENCE OF PROSTHETIC HEART VALVE: ICD-10-CM

## 2024-07-02 PROCEDURE — G2211 COMPLEX E/M VISIT ADD ON: CPT

## 2024-07-02 PROCEDURE — 99214 OFFICE O/P EST MOD 30 MIN: CPT

## 2024-07-02 RX ORDER — FAMOTIDINE 20 MG/1
20 TABLET, FILM COATED ORAL
Refills: 0 | Status: ACTIVE | COMMUNITY

## 2024-07-02 RX ORDER — BUDESONIDE, GLYCOPYRROLATE, AND FORMOTEROL FUMARATE 160; 9; 4.8 UG/1; UG/1; UG/1
160-9-4.8 AEROSOL, METERED RESPIRATORY (INHALATION)
Refills: 0 | Status: ACTIVE | COMMUNITY

## 2024-07-05 ENCOUNTER — APPOINTMENT (OUTPATIENT)
Dept: PULMONOLOGY | Facility: CLINIC | Age: 84
End: 2024-07-05

## 2024-07-19 ENCOUNTER — RX RENEWAL (OUTPATIENT)
Age: 84
End: 2024-07-19

## 2024-07-29 ENCOUNTER — APPOINTMENT (OUTPATIENT)
Dept: PULMONOLOGY | Facility: CLINIC | Age: 84
End: 2024-07-29

## 2024-08-17 ENCOUNTER — RX RENEWAL (OUTPATIENT)
Age: 84
End: 2024-08-17

## 2024-09-03 ENCOUNTER — NON-APPOINTMENT (OUTPATIENT)
Age: 84
End: 2024-09-03

## 2024-09-03 ENCOUNTER — APPOINTMENT (OUTPATIENT)
Dept: CARDIOLOGY | Facility: CLINIC | Age: 84
End: 2024-09-03
Payer: MEDICARE

## 2024-09-03 VITALS
BODY MASS INDEX: 18.33 KG/M2 | SYSTOLIC BLOOD PRESSURE: 122 MMHG | WEIGHT: 110 LBS | HEART RATE: 50 BPM | HEIGHT: 65 IN | DIASTOLIC BLOOD PRESSURE: 80 MMHG | OXYGEN SATURATION: 97 %

## 2024-09-03 DIAGNOSIS — R93.89 ABNORMAL FINDINGS ON DIAGNOSTIC IMAGING OF OTHER SPECIFIED BODY STRUCTURES: ICD-10-CM

## 2024-09-03 DIAGNOSIS — J47.9 BRONCHIECTASIS, UNCOMPLICATED: ICD-10-CM

## 2024-09-03 DIAGNOSIS — Z95.1 PRESENCE OF AORTOCORONARY BYPASS GRAFT: ICD-10-CM

## 2024-09-03 DIAGNOSIS — J44.9 CHRONIC OBSTRUCTIVE PULMONARY DISEASE, UNSPECIFIED: ICD-10-CM

## 2024-09-03 DIAGNOSIS — Z95.2 PRESENCE OF PROSTHETIC HEART VALVE: ICD-10-CM

## 2024-09-03 DIAGNOSIS — I30.0 ACUTE NONSPECIFIC IDIOPATHIC PERICARDITIS: ICD-10-CM

## 2024-09-03 DIAGNOSIS — I48.91 UNSPECIFIED ATRIAL FIBRILLATION: ICD-10-CM

## 2024-09-03 DIAGNOSIS — I10 ESSENTIAL (PRIMARY) HYPERTENSION: ICD-10-CM

## 2024-09-03 DIAGNOSIS — Z78.9 OTHER SPECIFIED HEALTH STATUS: ICD-10-CM

## 2024-09-03 DIAGNOSIS — I35.0 NONRHEUMATIC AORTIC (VALVE) STENOSIS: ICD-10-CM

## 2024-09-03 PROCEDURE — 99215 OFFICE O/P EST HI 40 MIN: CPT

## 2024-09-03 PROCEDURE — G2211 COMPLEX E/M VISIT ADD ON: CPT

## 2024-09-03 PROCEDURE — 93000 ELECTROCARDIOGRAM COMPLETE: CPT

## 2024-09-03 NOTE — DISCUSSION/SUMMARY
[FreeTextEntry1] : Continue current meds. especially lipid meds.Lipids were well controlled.,  But he became intolerant to the statins and his numbers alison.  I will not prescribe Repatha.  However he thinks his joint pains may be from his statin which he will stop for a month to see if he feels better.  If he does I will switch him to Repatha. Lipid profile and HgbA1c was excellent  He remains in A-fib at a controlled rate and will take the Eliquis 2.5 twice daily. I will see him again in a month and hopefully begin to titrate some of his medications and consider cardioversion. Because of bradycardia and postural hypotension I reduced his metoprolol to 25 mg daily.  I will see him again in 2 months and if necessary discontinue the metoprolol.   [EKG obtained to assist in diagnosis and management of assessed problem(s)] : EKG obtained to assist in diagnosis and management of assessed problem(s)

## 2024-09-03 NOTE — REASON FOR VISIT
[Follow-Up - Clinic] : a clinic follow-up of [Coronary Artery Disease] : coronary artery disease [Hyperlipidemia] : hyperlipidemia [Hypertension] : hypertension [FreeTextEntry1] : I saw this 84-year-old man in followup consultation on  09/03/24 Going through an extensive work-up to try and diagnosed if he has an adrenal tumor, and as a result has been put on large doses of beta-blocker.  His main complaint is fatigue and shortness of breath on activity and his resting heart rate is 49. I will start to reduce his Coreg, to allow him to increase his heart rate with activity.This improved to 55 so I will reduce the Coreg further to alternate day 1 dose of -6.5 mg If his blood pressure remains elevated after this he will increase the Norvasc to 10 mg daily  We will repeat the echo to reassess his aortic stenosis.  This was unchanged Was planning a CTA but have to watch his renal function. Not improved   He presented to the hospital 09/19 with chest pain and ST elevation and underwent emergent angiography, which showed moderate triple-vessel disease and a normal left ventricle. He was diagnosed as having pericarditis, with a pericardial rub auscultated and a CT scan showing a small pericardial effusion. He also had patchy bilateral pneumonia with a high white count and was treated with antibiotics, and colchicine for the pericarditis. Within 48 hours his pain was completely resolved, and his white count came down, and he was discharged on medication. He has been doing well with no recurrence, functioning at a high level without symptoms On medication his lipid profile is excellent He comes in today because he has been experiencing exertional dyspnea and excessive spikes of his blood pressure. He is being worked up for a renal origin of his blood pressure spikes He stopped his statin which he was intolerant to, and I will prescribe Repatha He finally had a cardiac CT which showed triple-vessel coronary disease, underwent cardiac cath and was transferred to [facility] for three-vessel CABG and aortic valve replacement.  He tolerated this well and is 11 weeks postop and recuperating well.. At rest he has no issues with his breathing, seems to be eating much better, and increasing his physical activities without issues. His only complaint is that when he gets up from a sitting position he feels lightheaded for a few seconds and this may be due to the fact that his A-fib rate is low, 49, because of his metoprolol twice daily.  I will reduce it to daily and see him again in 2 months and may decide to stop it altogether.

## 2024-09-03 NOTE — HISTORY OF PRESENT ILLNESS
[FreeTextEntry1] : he has no chest pain He has much less exertional  shortness of breath He has no palpitations He has no syncope.Postural lightheadedness He is neurologically intact He has no edema He has no GI symptoms

## 2024-09-27 LAB — HBA1C MFR BLD HPLC: 5.9

## 2024-10-03 ENCOUNTER — APPOINTMENT (OUTPATIENT)
Dept: CARDIOLOGY | Facility: CLINIC | Age: 84
End: 2024-10-03
Payer: MEDICARE

## 2024-10-03 ENCOUNTER — RESULT REVIEW (OUTPATIENT)
Age: 84
End: 2024-10-03

## 2024-10-03 ENCOUNTER — NON-APPOINTMENT (OUTPATIENT)
Age: 84
End: 2024-10-03

## 2024-10-03 ENCOUNTER — APPOINTMENT (OUTPATIENT)
Dept: CT IMAGING | Facility: CLINIC | Age: 84
End: 2024-10-03

## 2024-10-03 DIAGNOSIS — E78.5 HYPERLIPIDEMIA, UNSPECIFIED: ICD-10-CM

## 2024-10-03 DIAGNOSIS — J44.9 CHRONIC OBSTRUCTIVE PULMONARY DISEASE, UNSPECIFIED: ICD-10-CM

## 2024-10-03 DIAGNOSIS — Z78.9 OTHER SPECIFIED HEALTH STATUS: ICD-10-CM

## 2024-10-03 DIAGNOSIS — I25.10 ATHEROSCLEROTIC HEART DISEASE OF NATIVE CORONARY ARTERY W/OUT ANGINA PECTORIS: ICD-10-CM

## 2024-10-03 DIAGNOSIS — I48.91 UNSPECIFIED ATRIAL FIBRILLATION: ICD-10-CM

## 2024-10-03 DIAGNOSIS — I31.9 DISEASE OF PERICARDIUM, UNSPECIFIED: ICD-10-CM

## 2024-10-03 DIAGNOSIS — J47.9 BRONCHIECTASIS, UNCOMPLICATED: ICD-10-CM

## 2024-10-03 DIAGNOSIS — I10 ESSENTIAL (PRIMARY) HYPERTENSION: ICD-10-CM

## 2024-10-03 DIAGNOSIS — Z95.2 PRESENCE OF PROSTHETIC HEART VALVE: ICD-10-CM

## 2024-10-03 DIAGNOSIS — Z95.1 PRESENCE OF AORTOCORONARY BYPASS GRAFT: ICD-10-CM

## 2024-10-03 DIAGNOSIS — Z79.01 LONG TERM (CURRENT) USE OF ANTICOAGULANTS: ICD-10-CM

## 2024-10-03 PROCEDURE — 99214 OFFICE O/P EST MOD 30 MIN: CPT

## 2024-10-03 PROCEDURE — G2211 COMPLEX E/M VISIT ADD ON: CPT

## 2024-10-03 PROCEDURE — 93000 ELECTROCARDIOGRAM COMPLETE: CPT

## 2024-10-03 PROCEDURE — 71250 CT THORAX DX C-: CPT | Mod: MH

## 2024-10-03 NOTE — PHYSICAL EXAM
[General Appearance - Well Developed] : well developed [Normal Appearance] : normal appearance [Well Groomed] : well groomed [General Appearance - Well Nourished] : well nourished [No Deformities] : no deformities [General Appearance - In No Acute Distress] : no acute distress [Normal Conjunctiva] : the conjunctiva exhibited no abnormalities [Eyelids - No Xanthelasma] : the eyelids demonstrated no xanthelasmas [Normal Oral Mucosa] : normal oral mucosa [No Oral Pallor] : no oral pallor [No Oral Cyanosis] : no oral cyanosis [Normal Jugular Venous A Waves Present] : normal jugular venous A waves present [Normal Jugular Venous V Waves Present] : normal jugular venous V waves present [No Jugular Venous Viera A Waves] : no jugular venous viera A waves [Exaggerated Use Of Accessory Muscles For Inspiration] : no accessory muscle use [Respiration, Rhythm And Depth] : normal respiratory rhythm and effort [Auscultation Breath Sounds / Voice Sounds] : lungs were clear to auscultation bilaterally [Heart Rate And Rhythm] : heart rate and rhythm were normal [Heart Sounds] : normal S1 and S2 [Murmurs] : no murmurs present [Abdomen Soft] : soft [Abdomen Tenderness] : non-tender [Abdomen Mass (___ Cm)] : no abdominal mass palpated [Abnormal Walk] : normal gait [Gait - Sufficient For Exercise Testing] : the gait was sufficient for exercise testing [Nail Clubbing] : no clubbing of the fingernails [Cyanosis, Localized] : no localized cyanosis [Petechial Hemorrhages (___cm)] : no petechial hemorrhages [Skin Color & Pigmentation] : normal skin color and pigmentation [] : no rash [No Venous Stasis] : no venous stasis [Skin Lesions] : no skin lesions [No Skin Ulcers] : no skin ulcer [No Xanthoma] : no  xanthoma was observed [Oriented To Time, Place, And Person] : oriented to person, place, and time [Affect] : the affect was normal [No Anxiety] : not feeling anxious [Mood] : the mood was normal

## 2024-10-03 NOTE — DISCUSSION/SUMMARY
[EKG obtained to assist in diagnosis and management of assessed problem(s)] : EKG obtained to assist in diagnosis and management of assessed problem(s) [FreeTextEntry1] : Continue current meds. especially lipid meds.Lipids were well controlled.,  But he became intolerant to the statins and his numbers alison.  I will not prescribe Repatha.  However he thinks his joint pains may be from his statin which he will stop for a month to see if he feels better.  If he does I will switch him to Repatha. Lipid profile and HgbA1c was excellent  He remains in A-fib/flutter at a controlled rate and will take the Eliquis 2.5 twice daily. I will see him again in a month and hopefully begin to titrate some of his medications and consider cardioversion. Because of bradycardia and postural hypotension I reduced his metoprolol to 25 mg daily.  I will see him again in 2 months and if necessary discontinue the metoprolol.

## 2024-10-03 NOTE — REASON FOR VISIT
[Follow-Up - Clinic] : a clinic follow-up of [Coronary Artery Disease] : coronary artery disease [Hyperlipidemia] : hyperlipidemia [Hypertension] : hypertension [FreeTextEntry1] : I saw this 84-year-old man in followup consultation on  10/03/24 Going through an extensive work-up to try and diagnosed if he has an adrenal tumor, and as a result has been put on large doses of beta-blocker.  His main complaint is fatigue and shortness of breath on activity and his resting heart rate is 49. I will start to reduce his Coreg, to allow him to increase his heart rate with activity.This improved to 55 so I will reduce the Coreg further to alternate day 1 dose of -6.5 mg If his blood pressure remains elevated after this he will increase the Norvasc to 10 mg daily  We will repeat the echo to reassess his aortic stenosis.  This was unchanged Was planning a CTA but have to watch his renal function. Not improved   He presented to the hospital 09/19 with chest pain and ST elevation and underwent emergent angiography, which showed moderate triple-vessel disease and a normal left ventricle. He was diagnosed as having pericarditis, with a pericardial rub auscultated and a CT scan showing a small pericardial effusion. He also had patchy bilateral pneumonia with a high white count and was treated with antibiotics, and colchicine for the pericarditis. Within 48 hours his pain was completely resolved, and his white count came down, and he was discharged on medication. He has been doing well with no recurrence, functioning at a high level without symptoms On medication his lipid profile is excellent He comes in today because he has been experiencing exertional dyspnea and excessive spikes of his blood pressure. He is being worked up for a renal origin of his blood pressure spikes He stopped his statin which he was intolerant to, and I will prescribe Repatha He finally had a cardiac CT which showed triple-vessel coronary disease, underwent cardiac cath and was transferred to [facility] for three-vessel CABG and aortic valve replacement.  He tolerated this well and is 11 weeks postop and recuperating well.. At rest he has no issues with his breathing, seems to be eating much better, and increasing his physical activities without issues. He is now walking up to 2 miles without any difficulty.  The only time he gets short of breath is walking up stairs.  Occasionally he takes an additional diuretic for some edema of his legs.  This is rare maybe once a month. EKG done today continues to show atrial flutter.

## 2024-10-11 ENCOUNTER — NON-APPOINTMENT (OUTPATIENT)
Age: 84
End: 2024-10-11

## 2024-10-14 ENCOUNTER — APPOINTMENT (OUTPATIENT)
Dept: PULMONOLOGY | Facility: CLINIC | Age: 84
End: 2024-10-14
Payer: MEDICARE

## 2024-10-14 VITALS — HEART RATE: 58 BPM | SYSTOLIC BLOOD PRESSURE: 132 MMHG | OXYGEN SATURATION: 98 % | DIASTOLIC BLOOD PRESSURE: 75 MMHG

## 2024-10-14 DIAGNOSIS — Z23 ENCOUNTER FOR IMMUNIZATION: ICD-10-CM

## 2024-10-14 DIAGNOSIS — J44.9 CHRONIC OBSTRUCTIVE PULMONARY DISEASE, UNSPECIFIED: ICD-10-CM

## 2024-10-14 DIAGNOSIS — R93.89 ABNORMAL FINDINGS ON DIAGNOSTIC IMAGING OF OTHER SPECIFIED BODY STRUCTURES: ICD-10-CM

## 2024-10-14 DIAGNOSIS — J47.9 BRONCHIECTASIS, UNCOMPLICATED: ICD-10-CM

## 2024-10-14 PROCEDURE — 94727 GAS DIL/WSHOT DETER LNG VOL: CPT

## 2024-10-14 PROCEDURE — ZZZZZ: CPT

## 2024-10-14 PROCEDURE — 99214 OFFICE O/P EST MOD 30 MIN: CPT | Mod: 25

## 2024-10-14 PROCEDURE — G0008: CPT

## 2024-10-14 PROCEDURE — 94060 EVALUATION OF WHEEZING: CPT

## 2024-10-14 PROCEDURE — 94729 DIFFUSING CAPACITY: CPT

## 2024-10-14 PROCEDURE — 90662 IIV NO PRSV INCREASED AG IM: CPT

## 2024-11-11 ENCOUNTER — APPOINTMENT (OUTPATIENT)
Dept: ULTRASOUND IMAGING | Facility: CLINIC | Age: 84
End: 2024-11-11

## 2024-11-11 ENCOUNTER — APPOINTMENT (OUTPATIENT)
Dept: CT IMAGING | Facility: CLINIC | Age: 84
End: 2024-11-11

## 2024-11-18 ENCOUNTER — OUTPATIENT (OUTPATIENT)
Dept: OUTPATIENT SERVICES | Facility: HOSPITAL | Age: 84
LOS: 1 days | End: 2024-11-18
Payer: MEDICARE

## 2024-11-18 ENCOUNTER — APPOINTMENT (OUTPATIENT)
Dept: NEUROSURGERY | Facility: CLINIC | Age: 84
End: 2024-11-18

## 2024-11-18 ENCOUNTER — APPOINTMENT (OUTPATIENT)
Dept: MRI IMAGING | Facility: IMAGING CENTER | Age: 84
End: 2024-11-18
Payer: MEDICARE

## 2024-11-18 DIAGNOSIS — M54.14 RADICULOPATHY, THORACIC REGION: ICD-10-CM

## 2024-11-18 PROCEDURE — 72146 MRI CHEST SPINE W/O DYE: CPT | Mod: 26,MH

## 2024-11-18 PROCEDURE — 72146 MRI CHEST SPINE W/O DYE: CPT

## 2024-12-04 ENCOUNTER — OUTPATIENT (OUTPATIENT)
Dept: OUTPATIENT SERVICES | Facility: HOSPITAL | Age: 84
LOS: 1 days | End: 2024-12-04
Payer: MEDICARE

## 2024-12-04 DIAGNOSIS — D82.4 HYPERIMMUNOGLOBULIN E [IGE] SYNDROME: ICD-10-CM

## 2024-12-05 ENCOUNTER — APPOINTMENT (OUTPATIENT)
Dept: CARDIOLOGY | Facility: CLINIC | Age: 84
End: 2024-12-05
Payer: MEDICARE

## 2024-12-05 VITALS
BODY MASS INDEX: 17.49 KG/M2 | SYSTOLIC BLOOD PRESSURE: 134 MMHG | WEIGHT: 105 LBS | HEIGHT: 65 IN | HEART RATE: 69 BPM | DIASTOLIC BLOOD PRESSURE: 80 MMHG | OXYGEN SATURATION: 98 %

## 2024-12-05 DIAGNOSIS — I48.91 UNSPECIFIED ATRIAL FIBRILLATION: ICD-10-CM

## 2024-12-05 DIAGNOSIS — I10 ESSENTIAL (PRIMARY) HYPERTENSION: ICD-10-CM

## 2024-12-05 DIAGNOSIS — Z79.01 LONG TERM (CURRENT) USE OF ANTICOAGULANTS: ICD-10-CM

## 2024-12-05 DIAGNOSIS — Z95.2 PRESENCE OF PROSTHETIC HEART VALVE: ICD-10-CM

## 2024-12-05 DIAGNOSIS — Z95.1 PRESENCE OF AORTOCORONARY BYPASS GRAFT: ICD-10-CM

## 2024-12-05 DIAGNOSIS — E78.5 HYPERLIPIDEMIA, UNSPECIFIED: ICD-10-CM

## 2024-12-05 PROCEDURE — 99215 OFFICE O/P EST HI 40 MIN: CPT

## 2024-12-05 PROCEDURE — G2211 COMPLEX E/M VISIT ADD ON: CPT

## 2024-12-10 ENCOUNTER — APPOINTMENT (OUTPATIENT)
Dept: NEUROSURGERY | Facility: CLINIC | Age: 84
End: 2024-12-10
Payer: MEDICARE

## 2024-12-10 VITALS
BODY MASS INDEX: 17.49 KG/M2 | HEIGHT: 65 IN | DIASTOLIC BLOOD PRESSURE: 100 MMHG | SYSTOLIC BLOOD PRESSURE: 160 MMHG | WEIGHT: 105 LBS

## 2024-12-10 DIAGNOSIS — M81.0 AGE-RELATED OSTEOPOROSIS W/OUT CURRENT PATHOLOGICAL FRACTURE: ICD-10-CM

## 2024-12-10 DIAGNOSIS — M48.50XA COLLAPSED VERTEBRA, NOT ELSEWHERE CLASSIFIED, SITE UNSPECIFIED, INITIAL ENCOUNTER FOR FRACTURE: ICD-10-CM

## 2024-12-10 PROCEDURE — 99204 OFFICE O/P NEW MOD 45 MIN: CPT

## 2024-12-11 ENCOUNTER — APPOINTMENT (OUTPATIENT)
Dept: HEMATOLOGY ONCOLOGY | Facility: CLINIC | Age: 84
End: 2024-12-11
Payer: MEDICARE

## 2024-12-11 ENCOUNTER — RESULT REVIEW (OUTPATIENT)
Age: 84
End: 2024-12-11

## 2024-12-11 ENCOUNTER — NON-APPOINTMENT (OUTPATIENT)
Age: 84
End: 2024-12-11

## 2024-12-11 VITALS
HEIGHT: 65 IN | TEMPERATURE: 59 F | WEIGHT: 106 LBS | SYSTOLIC BLOOD PRESSURE: 155 MMHG | DIASTOLIC BLOOD PRESSURE: 93 MMHG | BODY MASS INDEX: 17.66 KG/M2 | HEART RATE: 59 BPM | OXYGEN SATURATION: 94 %

## 2024-12-11 DIAGNOSIS — R76.8 OTHER SPECIFIED ABNORMAL IMMUNOLOGICAL FINDINGS IN SERUM: ICD-10-CM

## 2024-12-11 DIAGNOSIS — D72.10 EOSINOPHILIA, UNSPECIFIED: ICD-10-CM

## 2024-12-11 LAB
BASOPHILS # BLD AUTO: 0.07 K/UL — SIGNIFICANT CHANGE UP (ref 0–0.2)
BASOPHILS NFR BLD AUTO: 0.9 % — SIGNIFICANT CHANGE UP (ref 0–2)
EOSINOPHIL # BLD AUTO: 0.18 K/UL — SIGNIFICANT CHANGE UP (ref 0–0.5)
EOSINOPHIL NFR BLD AUTO: 2.4 % — SIGNIFICANT CHANGE UP (ref 0–6)
HCT VFR BLD CALC: 51.7 % — HIGH (ref 39–50)
HGB BLD-MCNC: 17 G/DL — SIGNIFICANT CHANGE UP (ref 13–17)
IMM GRANULOCYTES NFR BLD AUTO: 0.9 % — SIGNIFICANT CHANGE UP (ref 0–0.9)
LYMPHOCYTES # BLD AUTO: 0.86 K/UL — LOW (ref 1–3.3)
LYMPHOCYTES # BLD AUTO: 11.5 % — LOW (ref 13–44)
MCHC RBC-ENTMCNC: 32.9 G/DL — SIGNIFICANT CHANGE UP (ref 32–36)
MCHC RBC-ENTMCNC: 32.9 PG — SIGNIFICANT CHANGE UP (ref 27–34)
MCV RBC AUTO: 100 FL — SIGNIFICANT CHANGE UP (ref 80–100)
MONOCYTES # BLD AUTO: 0.64 K/UL — SIGNIFICANT CHANGE UP (ref 0–0.9)
MONOCYTES NFR BLD AUTO: 8.5 % — SIGNIFICANT CHANGE UP (ref 2–14)
NEUTROPHILS # BLD AUTO: 5.69 K/UL — SIGNIFICANT CHANGE UP (ref 1.8–7.4)
NEUTROPHILS NFR BLD AUTO: 75.8 % — SIGNIFICANT CHANGE UP (ref 43–77)
NRBC # BLD: 0 /100 WBCS — SIGNIFICANT CHANGE UP (ref 0–0)
NRBC BLD-RTO: 0 /100 WBCS — SIGNIFICANT CHANGE UP (ref 0–0)
PLATELET # BLD AUTO: 396 K/UL — SIGNIFICANT CHANGE UP (ref 150–400)
RBC # BLD: 5.17 M/UL — SIGNIFICANT CHANGE UP (ref 4.2–5.8)
RBC # FLD: 14.4 % — SIGNIFICANT CHANGE UP (ref 10.3–14.5)
WBC # BLD: 7.51 K/UL — SIGNIFICANT CHANGE UP (ref 3.8–10.5)
WBC # FLD AUTO: 7.51 K/UL — SIGNIFICANT CHANGE UP (ref 3.8–10.5)

## 2024-12-11 PROCEDURE — 99204 OFFICE O/P NEW MOD 45 MIN: CPT

## 2024-12-11 PROCEDURE — 85027 COMPLETE CBC AUTOMATED: CPT

## 2024-12-11 PROCEDURE — G2211 COMPLEX E/M VISIT ADD ON: CPT

## 2024-12-12 LAB
CRP SERPL-MCNC: 3 MG/L
DEPRECATED KAPPA LC FREE/LAMBDA SER: 1.91 RATIO
IGA SER QL IEP: 376 MG/DL
IGD SER-MCNC: 27 MG/DL
IGE SER-MCNC: 4851 KU/L
IGG SER QL IEP: 1210 MG/DL
IGM SER QL IEP: 117 MG/DL
KAPPA LC CSF-MCNC: 4.39 MG/DL
KAPPA LC SERPL-MCNC: 8.4 MG/DL

## 2024-12-13 LAB — M PROTEIN SPEC IFE-MCNC: NORMAL

## 2024-12-15 PROBLEM — R76.8 ELEVATED IGE LEVEL: Status: ACTIVE | Noted: 2024-12-11

## 2024-12-15 PROBLEM — D72.10 EOSINOPHILIA, UNSPECIFIED TYPE: Status: ACTIVE | Noted: 2024-12-11

## 2024-12-16 LAB — STRONGYLOIDES AB SER IA-ACNC: NEGATIVE

## 2025-01-24 ENCOUNTER — APPOINTMENT (OUTPATIENT)
Dept: PULMONOLOGY | Facility: CLINIC | Age: 85
End: 2025-01-24
Payer: MEDICARE

## 2025-01-24 VITALS — DIASTOLIC BLOOD PRESSURE: 76 MMHG | SYSTOLIC BLOOD PRESSURE: 126 MMHG

## 2025-01-24 VITALS
WEIGHT: 108.13 LBS | HEART RATE: 50 BPM | HEIGHT: 65 IN | SYSTOLIC BLOOD PRESSURE: 145 MMHG | OXYGEN SATURATION: 98 % | BODY MASS INDEX: 18.02 KG/M2 | DIASTOLIC BLOOD PRESSURE: 83 MMHG

## 2025-01-24 DIAGNOSIS — J47.9 BRONCHIECTASIS, UNCOMPLICATED: ICD-10-CM

## 2025-01-24 DIAGNOSIS — R93.89 ABNORMAL FINDINGS ON DIAGNOSTIC IMAGING OF OTHER SPECIFIED BODY STRUCTURES: ICD-10-CM

## 2025-01-24 DIAGNOSIS — J44.9 CHRONIC OBSTRUCTIVE PULMONARY DISEASE, UNSPECIFIED: ICD-10-CM

## 2025-01-24 PROCEDURE — 94010 BREATHING CAPACITY TEST: CPT

## 2025-01-24 PROCEDURE — 99213 OFFICE O/P EST LOW 20 MIN: CPT | Mod: 25

## 2025-03-07 ENCOUNTER — RESULT REVIEW (OUTPATIENT)
Age: 85
End: 2025-03-07

## 2025-03-11 ENCOUNTER — TRANSCRIPTION ENCOUNTER (OUTPATIENT)
Age: 85
End: 2025-03-11

## 2025-03-13 ENCOUNTER — TRANSCRIPTION ENCOUNTER (OUTPATIENT)
Age: 85
End: 2025-03-13

## 2025-03-13 DIAGNOSIS — M54.50 LOW BACK PAIN, UNSPECIFIED: ICD-10-CM

## 2025-03-13 DIAGNOSIS — R26.81 UNSTEADINESS ON FEET: ICD-10-CM

## 2025-03-18 ENCOUNTER — TRANSCRIPTION ENCOUNTER (OUTPATIENT)
Age: 85
End: 2025-03-18

## 2025-03-26 ENCOUNTER — TRANSCRIPTION ENCOUNTER (OUTPATIENT)
Age: 85
End: 2025-03-26

## 2025-04-01 ENCOUNTER — TRANSCRIPTION ENCOUNTER (OUTPATIENT)
Age: 85
End: 2025-04-01

## 2025-04-03 ENCOUNTER — APPOINTMENT (OUTPATIENT)
Dept: CT IMAGING | Facility: CLINIC | Age: 85
End: 2025-04-03
Payer: MEDICARE

## 2025-04-03 PROCEDURE — 71250 CT THORAX DX C-: CPT

## 2025-04-08 ENCOUNTER — TRANSCRIPTION ENCOUNTER (OUTPATIENT)
Age: 85
End: 2025-04-08

## 2025-04-11 ENCOUNTER — RX RENEWAL (OUTPATIENT)
Age: 85
End: 2025-04-11

## 2025-05-06 ENCOUNTER — NON-APPOINTMENT (OUTPATIENT)
Age: 85
End: 2025-05-06

## 2025-05-08 ENCOUNTER — NON-APPOINTMENT (OUTPATIENT)
Age: 85
End: 2025-05-08

## 2025-05-08 ENCOUNTER — APPOINTMENT (OUTPATIENT)
Dept: CARDIOLOGY | Facility: CLINIC | Age: 85
End: 2025-05-08
Payer: MEDICARE

## 2025-05-08 VITALS
WEIGHT: 108 LBS | OXYGEN SATURATION: 98 % | SYSTOLIC BLOOD PRESSURE: 110 MMHG | HEIGHT: 65 IN | DIASTOLIC BLOOD PRESSURE: 68 MMHG | BODY MASS INDEX: 17.99 KG/M2 | HEART RATE: 65 BPM

## 2025-05-08 DIAGNOSIS — I48.91 UNSPECIFIED ATRIAL FIBRILLATION: ICD-10-CM

## 2025-05-08 DIAGNOSIS — I30.0 ACUTE NONSPECIFIC IDIOPATHIC PERICARDITIS: ICD-10-CM

## 2025-05-08 DIAGNOSIS — E78.5 HYPERLIPIDEMIA, UNSPECIFIED: ICD-10-CM

## 2025-05-08 DIAGNOSIS — I10 ESSENTIAL (PRIMARY) HYPERTENSION: ICD-10-CM

## 2025-05-08 DIAGNOSIS — J44.9 CHRONIC OBSTRUCTIVE PULMONARY DISEASE, UNSPECIFIED: ICD-10-CM

## 2025-05-08 DIAGNOSIS — R93.89 ABNORMAL FINDINGS ON DIAGNOSTIC IMAGING OF OTHER SPECIFIED BODY STRUCTURES: ICD-10-CM

## 2025-05-08 DIAGNOSIS — Z78.9 OTHER SPECIFIED HEALTH STATUS: ICD-10-CM

## 2025-05-08 DIAGNOSIS — Z95.2 PRESENCE OF PROSTHETIC HEART VALVE: ICD-10-CM

## 2025-05-08 DIAGNOSIS — M54.50 LOW BACK PAIN, UNSPECIFIED: ICD-10-CM

## 2025-05-08 DIAGNOSIS — Z95.1 PRESENCE OF AORTOCORONARY BYPASS GRAFT: ICD-10-CM

## 2025-05-08 PROCEDURE — 99215 OFFICE O/P EST HI 40 MIN: CPT

## 2025-05-08 PROCEDURE — G2211 COMPLEX E/M VISIT ADD ON: CPT

## 2025-05-08 PROCEDURE — 93000 ELECTROCARDIOGRAM COMPLETE: CPT

## 2025-05-12 ENCOUNTER — APPOINTMENT (OUTPATIENT)
Dept: PULMONOLOGY | Facility: CLINIC | Age: 85
End: 2025-05-12

## 2025-05-21 ENCOUNTER — APPOINTMENT (OUTPATIENT)
Dept: CARDIOLOGY | Facility: CLINIC | Age: 85
End: 2025-05-21
Payer: MEDICARE

## 2025-05-21 ENCOUNTER — NON-APPOINTMENT (OUTPATIENT)
Age: 85
End: 2025-05-21

## 2025-05-21 PROCEDURE — ZZZZZ: CPT

## 2025-05-28 ENCOUNTER — APPOINTMENT (OUTPATIENT)
Dept: CARDIOLOGY | Facility: CLINIC | Age: 85
End: 2025-05-28
Payer: MEDICARE

## 2025-05-28 ENCOUNTER — NON-APPOINTMENT (OUTPATIENT)
Age: 85
End: 2025-05-28

## 2025-05-28 VITALS
WEIGHT: 109 LBS | BODY MASS INDEX: 18.16 KG/M2 | SYSTOLIC BLOOD PRESSURE: 108 MMHG | OXYGEN SATURATION: 99 % | DIASTOLIC BLOOD PRESSURE: 60 MMHG | HEIGHT: 65 IN | HEART RATE: 52 BPM

## 2025-05-28 DIAGNOSIS — J44.9 CHRONIC OBSTRUCTIVE PULMONARY DISEASE, UNSPECIFIED: ICD-10-CM

## 2025-05-28 DIAGNOSIS — M54.50 LOW BACK PAIN, UNSPECIFIED: ICD-10-CM

## 2025-05-28 DIAGNOSIS — I35.0 NONRHEUMATIC AORTIC (VALVE) STENOSIS: ICD-10-CM

## 2025-05-28 DIAGNOSIS — J86.9 PYOTHORAX W/OUT FISTULA: ICD-10-CM

## 2025-05-28 DIAGNOSIS — I10 ESSENTIAL (PRIMARY) HYPERTENSION: ICD-10-CM

## 2025-05-28 DIAGNOSIS — Z95.2 PRESENCE OF PROSTHETIC HEART VALVE: ICD-10-CM

## 2025-05-28 DIAGNOSIS — I48.91 UNSPECIFIED ATRIAL FIBRILLATION: ICD-10-CM

## 2025-05-28 DIAGNOSIS — Z95.1 PRESENCE OF AORTOCORONARY BYPASS GRAFT: ICD-10-CM

## 2025-05-28 DIAGNOSIS — H61.019: ICD-10-CM

## 2025-05-28 DIAGNOSIS — Z78.9 OTHER SPECIFIED HEALTH STATUS: ICD-10-CM

## 2025-05-28 PROCEDURE — 93000 ELECTROCARDIOGRAM COMPLETE: CPT

## 2025-05-28 PROCEDURE — G2211 COMPLEX E/M VISIT ADD ON: CPT

## 2025-05-28 PROCEDURE — 99215 OFFICE O/P EST HI 40 MIN: CPT

## 2025-05-31 LAB — HBA1C MFR BLD HPLC: 5.9

## 2025-06-06 PROCEDURE — 93228 REMOTE 30 DAY ECG REV/REPORT: CPT

## 2025-06-12 ENCOUNTER — TRANSCRIPTION ENCOUNTER (OUTPATIENT)
Age: 85
End: 2025-06-12

## 2025-06-13 ENCOUNTER — APPOINTMENT (OUTPATIENT)
Dept: ENDOCRINOLOGY | Facility: CLINIC | Age: 85
End: 2025-06-13
Payer: MEDICARE

## 2025-06-13 VITALS
DIASTOLIC BLOOD PRESSURE: 60 MMHG | OXYGEN SATURATION: 97 % | BODY MASS INDEX: 18.49 KG/M2 | HEART RATE: 63 BPM | TEMPERATURE: 98 F | SYSTOLIC BLOOD PRESSURE: 114 MMHG | WEIGHT: 111 LBS | RESPIRATION RATE: 16 BRPM | HEIGHT: 65 IN

## 2025-06-13 PROBLEM — M81.0 AGE RELATED OSTEOPOROSIS, UNSPECIFIED PATHOLOGICAL FRACTURE PRESENCE: Status: ACTIVE | Noted: 2025-06-13

## 2025-06-13 PROCEDURE — G2211 COMPLEX E/M VISIT ADD ON: CPT

## 2025-06-13 PROCEDURE — 99204 OFFICE O/P NEW MOD 45 MIN: CPT

## 2025-06-13 RX ORDER — DEXAMETHASONE 1 MG/1
1 TABLET ORAL
Qty: 1 | Refills: 0 | Status: ACTIVE | COMMUNITY
Start: 2025-06-13 | End: 1900-01-01

## 2025-07-03 ENCOUNTER — NON-APPOINTMENT (OUTPATIENT)
Age: 85
End: 2025-07-03

## 2025-07-31 ENCOUNTER — APPOINTMENT (OUTPATIENT)
Dept: CARDIOLOGY | Facility: CLINIC | Age: 85
End: 2025-07-31
Payer: MEDICARE

## 2025-07-31 VITALS
SYSTOLIC BLOOD PRESSURE: 112 MMHG | DIASTOLIC BLOOD PRESSURE: 58 MMHG | OXYGEN SATURATION: 97 % | BODY MASS INDEX: 18.66 KG/M2 | HEART RATE: 78 BPM | HEIGHT: 65 IN | WEIGHT: 112 LBS

## 2025-07-31 DIAGNOSIS — I48.0 PAROXYSMAL ATRIAL FIBRILLATION: ICD-10-CM

## 2025-07-31 DIAGNOSIS — Z79.01 LONG TERM (CURRENT) USE OF ANTICOAGULANTS: ICD-10-CM

## 2025-07-31 DIAGNOSIS — R93.89 ABNORMAL FINDINGS ON DIAGNOSTIC IMAGING OF OTHER SPECIFIED BODY STRUCTURES: ICD-10-CM

## 2025-07-31 DIAGNOSIS — E78.5 HYPERLIPIDEMIA, UNSPECIFIED: ICD-10-CM

## 2025-07-31 DIAGNOSIS — Z95.2 PRESENCE OF PROSTHETIC HEART VALVE: ICD-10-CM

## 2025-07-31 DIAGNOSIS — J44.9 CHRONIC OBSTRUCTIVE PULMONARY DISEASE, UNSPECIFIED: ICD-10-CM

## 2025-07-31 DIAGNOSIS — Z95.1 PRESENCE OF AORTOCORONARY BYPASS GRAFT: ICD-10-CM

## 2025-07-31 DIAGNOSIS — I25.10 ATHEROSCLEROTIC HEART DISEASE OF NATIVE CORONARY ARTERY W/OUT ANGINA PECTORIS: ICD-10-CM

## 2025-07-31 PROCEDURE — G2211 COMPLEX E/M VISIT ADD ON: CPT

## 2025-07-31 PROCEDURE — 93000 ELECTROCARDIOGRAM COMPLETE: CPT

## 2025-07-31 PROCEDURE — 99215 OFFICE O/P EST HI 40 MIN: CPT

## 2025-08-21 ENCOUNTER — RESULT REVIEW (OUTPATIENT)
Age: 85
End: 2025-08-21

## 2025-09-04 ENCOUNTER — APPOINTMENT (OUTPATIENT)
Dept: CT IMAGING | Facility: CLINIC | Age: 85
End: 2025-09-04

## 2025-09-04 PROCEDURE — 71250 CT THORAX DX C-: CPT | Mod: 26

## (undated) DEVICE — PREP CHLORAPREP HI-LITE ORANGE 26ML

## (undated) DEVICE — POSITIONER FOAM EGG CRATE ULNAR 2PCS (PINK)

## (undated) DEVICE — WOUND IRR IRRISEPT W 0.5 CHG

## (undated) DEVICE — WARMING BLANKET DUO-THERM HYPER/HYPOTHERM ADULT

## (undated) DEVICE — SUT SILK 2-0 18" SH (POP-OFF)

## (undated) DEVICE — SUT SILK 4-0 30" RB-1

## (undated) DEVICE — PREP SCRUB BRUSH W CHG 4%

## (undated) DEVICE — TONGUE DEPRESSOR

## (undated) DEVICE — MAXI-FLO SUCTION CATHETER KIT 14FR STRAIGHT

## (undated) DEVICE — DRAPE 3/4 SHEET 52X76"

## (undated) DEVICE — SUT PROLENE 7-0 24" BV-1

## (undated) DEVICE — SUT MONOCRYL 4-0 27" PS-2 UNDYED

## (undated) DEVICE — DRSG MEPILEX 10 X 25CM (4 X 10") POST OP AG SILVER

## (undated) DEVICE — DRSG MEPILEX 10 X 10CM (4 X 4") AG

## (undated) DEVICE — TUBING INSUFFLATION LAP FILTER 10FT

## (undated) DEVICE — DRAPE TOWEL BLUE 17" X 24"

## (undated) DEVICE — SUT PROLENE 4-0 36" SH

## (undated) DEVICE — STAPLER SKIN PROXIMATE

## (undated) DEVICE — SUT SILK 0 30" SH

## (undated) DEVICE — SUT PROLENE 4-0 30" SH-1

## (undated) DEVICE — DRAPE SLUSH / WARMER 44 X 66"

## (undated) DEVICE — MULTIPLE PERFUSION SET FEMALE 1 INLET LEG W 4 LEGS 15" (BLUE/RED)

## (undated) DEVICE — PRESSURE INFUSOR BAG 1000ML

## (undated) DEVICE — SUT VICRYL 3-0 27" CT-1

## (undated) DEVICE — SUT DOUBLE 6 WIRE STERNAL

## (undated) DEVICE — SUT ETHIBOND 2-0 4-30" RB-1 WHITE

## (undated) DEVICE — NDL COUNTER FOAM AND MAGNET 40-70

## (undated) DEVICE — AORTIC PUNCH 5MM STANDARD HANDLE

## (undated) DEVICE — STEALTH CLAMP INSERT FIBRA/FIBRA 60MM

## (undated) DEVICE — SUT PROLENE 3-0 36" SH

## (undated) DEVICE — VESSEL LOOP MAXI-RED  0.120" X 16"

## (undated) DEVICE — DRSG MEPILEX 10 X 30CM (4 X 12") WHITE

## (undated) DEVICE — SYNOVIS VASCULAR PROBE 1.5MM 15CM

## (undated) DEVICE — SUT PLEDGET 9MM X 4MM X 1.5MM

## (undated) DEVICE — SUT ETHIBOND 3-0 36" RB-1

## (undated) DEVICE — SYS VEIN HARVESTING VIRTUOSAPH PLUS W/ RADIAL

## (undated) DEVICE — SUT SILK 0 30" TIES

## (undated) DEVICE — SUT ETHIBOND 4-0 36" RB-1

## (undated) DEVICE — CONNECTOR STRAIGHT 3/8 X 1/2"

## (undated) DEVICE — SUT PROLENE 4-0 36" RB-1

## (undated) DEVICE — DRSG OPSITE 13.75 X 4"

## (undated) DEVICE — ELCTR BOVIE BLADE 3/4" EXTENDED LENGTH 6"

## (undated) DEVICE — LAP PAD 18 X 18"

## (undated) DEVICE — SOL INJ NS 0.9% 1000ML

## (undated) DEVICE — GOWN TRIMAX LG

## (undated) DEVICE — SUT VICRYL 1 36" CTX UNDYED

## (undated) DEVICE — BULLDOG SPRING CLIP 6MM SOFT/SOFT

## (undated) DEVICE — CHEST DRAIN PLEUR-EVAC DRY/WET ADULT-PEDS SINGLE (QUICK)

## (undated) DEVICE — ELCTR BOVIE PENCIL HANDPIECE ROCKER SWITCH 15FT

## (undated) DEVICE — SYR LUER LOK 20CC

## (undated) DEVICE — DRSG OPSITE 2.5 X 2"

## (undated) DEVICE — PACK VALVE

## (undated) DEVICE — SUT VICRYL 0 36" CTX UNDYED

## (undated) DEVICE — ELCTR MULTIFUNCTION DEFIBRILLATION ELECTRODE EDGE SYSTEM ADULT

## (undated) DEVICE — VASOVIEW HEMOPRO 2

## (undated) DEVICE — SUT ETHIBOND 1 30" OS6

## (undated) DEVICE — VISITEC 4X4

## (undated) DEVICE — DRSG TEGADERM 4X4.75"

## (undated) DEVICE — SUT PDS II 2-0 27" CT-1

## (undated) DEVICE — SOL ANTI FOG

## (undated) DEVICE — GLV 7.5 PROTEXIS (WHITE)

## (undated) DEVICE — SUT SILK 5-0 60" TIES

## (undated) DEVICE — SOL IRR POUR NS 0.9% 1000ML

## (undated) DEVICE — SUT PROLENE 6-0 30" C-1

## (undated) DEVICE — BEAVER BLADE MINI SHARP ALL ROUND (BLUE)

## (undated) DEVICE — SUT VICRYL 2-0 27" CT-1 UNDYED

## (undated) DEVICE — PACK OPEN HEART VAMP PLUS

## (undated) DEVICE — SUT ETHIBOND 2-0 36" SH

## (undated) DEVICE — MARKING PEN W RULER

## (undated) DEVICE — BLOWER MISTER VIPER II

## (undated) DEVICE — Device

## (undated) DEVICE — STOPCOCK 3 WAY W SWIVEL MALE LUER LOCK

## (undated) DEVICE — DRAPE IOBAN 33" X 23"

## (undated) DEVICE — SUT PROLENE 7-0 24" BV175-6